# Patient Record
Sex: FEMALE | Race: WHITE | NOT HISPANIC OR LATINO | Employment: OTHER | URBAN - METROPOLITAN AREA
[De-identification: names, ages, dates, MRNs, and addresses within clinical notes are randomized per-mention and may not be internally consistent; named-entity substitution may affect disease eponyms.]

---

## 2023-02-25 LAB
APPEARANCE, URINE: ABNORMAL
BILIRUBIN, URINE: NEGATIVE
BLOOD, URINE: ABNORMAL
COLOR, URINE: YELLOW
GLUCOSE, URINE: NEGATIVE MG/DL
KETONES, URINE: NEGATIVE MG/DL
LEUKOCYTE ESTERASE, URINE: ABNORMAL
NITRITE, URINE: NEGATIVE
PH, URINE: 6 (ref 5–8)
PROTEIN, URINE: NEGATIVE MG/DL
RBC, URINE: 1 /HPF (ref 0–5)
SPECIFIC GRAVITY, URINE: 1.01 (ref 1–1.03)
URINE CULTURE: NORMAL
UROBILINOGEN, URINE: <2 MG/DL (ref 0–1.9)
WBC CLUMPS, URINE: ABNORMAL /HPF
WBC, URINE: >182 /HPF (ref 0–5)

## 2023-03-15 LAB
ALBUMIN (G/DL) IN SER/PLAS: 4.3 G/DL (ref 3.4–5)
ALBUMIN (MG/L) IN URINE: 38.1 MG/L
ALBUMIN/CREATININE (UG/MG) IN URINE: 29.5 UG/MG CRT (ref 0–30)
ANION GAP IN SER/PLAS: 15 MMOL/L (ref 10–20)
APPEARANCE, URINE: ABNORMAL
BACTERIA, URINE: ABNORMAL /HPF
BILIRUBIN, URINE: NEGATIVE
BLOOD, URINE: ABNORMAL
CALCIUM (MG/DL) IN SER/PLAS: 9.7 MG/DL (ref 8.6–10.6)
CARBON DIOXIDE, TOTAL (MMOL/L) IN SER/PLAS: 25 MMOL/L (ref 21–32)
CHLORIDE (MMOL/L) IN SER/PLAS: 104 MMOL/L (ref 98–107)
COLOR, URINE: YELLOW
CREATININE (MG/DL) IN SER/PLAS: 2.27 MG/DL (ref 0.5–1.05)
CREATININE (MG/DL) IN URINE: 129 MG/DL (ref 20–320)
ERYTHROCYTE DISTRIBUTION WIDTH (RATIO) BY AUTOMATED COUNT: 15.2 % (ref 11.5–14.5)
ERYTHROCYTE MEAN CORPUSCULAR HEMOGLOBIN CONCENTRATION (G/DL) BY AUTOMATED: 31.4 G/DL (ref 32–36)
ERYTHROCYTE MEAN CORPUSCULAR VOLUME (FL) BY AUTOMATED COUNT: 97 FL (ref 80–100)
ERYTHROCYTES (10*6/UL) IN BLOOD BY AUTOMATED COUNT: 3.46 X10E12/L (ref 4–5.2)
GFR FEMALE: 21 ML/MIN/1.73M2
GLUCOSE (MG/DL) IN SER/PLAS: 119 MG/DL (ref 74–99)
GLUCOSE, URINE: NEGATIVE MG/DL
HEMATOCRIT (%) IN BLOOD BY AUTOMATED COUNT: 33.4 % (ref 36–46)
HEMOGLOBIN (G/DL) IN BLOOD: 10.5 G/DL (ref 12–16)
KETONES, URINE: NEGATIVE MG/DL
LEUKOCYTE ESTERASE, URINE: ABNORMAL
LEUKOCYTES (10*3/UL) IN BLOOD BY AUTOMATED COUNT: 10.9 X10E9/L (ref 4.4–11.3)
MUCUS, URINE: ABNORMAL /LPF
NITRITE, URINE: NEGATIVE
NRBC (PER 100 WBCS) BY AUTOMATED COUNT: 0 /100 WBC (ref 0–0)
PH, URINE: 5 (ref 5–8)
PHOSPHATE (MG/DL) IN SER/PLAS: 3.6 MG/DL (ref 2.5–4.9)
PLATELETS (10*3/UL) IN BLOOD AUTOMATED COUNT: 266 X10E9/L (ref 150–450)
POTASSIUM (MMOL/L) IN SER/PLAS: 3.9 MMOL/L (ref 3.5–5.3)
PROTEIN, URINE: NEGATIVE MG/DL
RBC, URINE: 4 /HPF (ref 0–5)
SODIUM (MMOL/L) IN SER/PLAS: 140 MMOL/L (ref 136–145)
SPECIFIC GRAVITY, URINE: 1.02 (ref 1–1.03)
SQUAMOUS EPITHELIAL CELLS, URINE: 1 /HPF
TRANSITIONAL EPITHELIAL CELLS, URINE: 1 /HPF
UREA NITROGEN (MG/DL) IN SER/PLAS: 65 MG/DL (ref 6–23)
UROBILINOGEN, URINE: <2 MG/DL (ref 0–1.9)
WBC CLUMPS, URINE: ABNORMAL /HPF
WBC, URINE: >182 /HPF (ref 0–5)

## 2023-04-19 LAB
ALBUMIN (G/DL) IN SER/PLAS: 4 G/DL (ref 3.4–5)
ANION GAP IN SER/PLAS: 17 MMOL/L (ref 10–20)
CALCIUM (MG/DL) IN SER/PLAS: 10 MG/DL (ref 8.6–10.6)
CARBON DIOXIDE, TOTAL (MMOL/L) IN SER/PLAS: 26 MMOL/L (ref 21–32)
CHLORIDE (MMOL/L) IN SER/PLAS: 103 MMOL/L (ref 98–107)
CREATININE (MG/DL) IN SER/PLAS: 2.07 MG/DL (ref 0.5–1.05)
ERYTHROCYTE DISTRIBUTION WIDTH (RATIO) BY AUTOMATED COUNT: 15.5 % (ref 11.5–14.5)
ERYTHROCYTE MEAN CORPUSCULAR HEMOGLOBIN CONCENTRATION (G/DL) BY AUTOMATED: 31.3 G/DL (ref 32–36)
ERYTHROCYTE MEAN CORPUSCULAR VOLUME (FL) BY AUTOMATED COUNT: 99 FL (ref 80–100)
ERYTHROCYTES (10*6/UL) IN BLOOD BY AUTOMATED COUNT: 3.47 X10E12/L (ref 4–5.2)
GFR FEMALE: 23 ML/MIN/1.73M2
GLUCOSE (MG/DL) IN SER/PLAS: 96 MG/DL (ref 74–99)
HEMATOCRIT (%) IN BLOOD BY AUTOMATED COUNT: 34.2 % (ref 36–46)
HEMOGLOBIN (G/DL) IN BLOOD: 10.7 G/DL (ref 12–16)
LEUKOCYTES (10*3/UL) IN BLOOD BY AUTOMATED COUNT: 10.1 X10E9/L (ref 4.4–11.3)
NRBC (PER 100 WBCS) BY AUTOMATED COUNT: 0 /100 WBC (ref 0–0)
PHOSPHATE (MG/DL) IN SER/PLAS: 4.1 MG/DL (ref 2.5–4.9)
PLATELETS (10*3/UL) IN BLOOD AUTOMATED COUNT: 195 X10E9/L (ref 150–450)
POTASSIUM (MMOL/L) IN SER/PLAS: 4 MMOL/L (ref 3.5–5.3)
SODIUM (MMOL/L) IN SER/PLAS: 142 MMOL/L (ref 136–145)
UREA NITROGEN (MG/DL) IN SER/PLAS: 59 MG/DL (ref 6–23)

## 2023-04-22 LAB
URINE CULTURE: ABNORMAL
URINE CULTURE: ABNORMAL

## 2023-05-01 ENCOUNTER — APPOINTMENT (OUTPATIENT)
Dept: LAB | Facility: LAB | Age: 88
End: 2023-05-01
Payer: MEDICARE

## 2023-05-02 LAB — URINE CULTURE: NORMAL

## 2023-07-11 LAB
ALBUMIN (G/DL) IN SER/PLAS: 4.1 G/DL (ref 3.4–5)
ANION GAP IN SER/PLAS: 17 MMOL/L (ref 10–20)
CALCIUM (MG/DL) IN SER/PLAS: 9.8 MG/DL (ref 8.6–10.6)
CARBON DIOXIDE, TOTAL (MMOL/L) IN SER/PLAS: 26 MMOL/L (ref 21–32)
CHLORIDE (MMOL/L) IN SER/PLAS: 105 MMOL/L (ref 98–107)
CREATININE (MG/DL) IN SER/PLAS: 2.01 MG/DL (ref 0.5–1.05)
ERYTHROCYTE DISTRIBUTION WIDTH (RATIO) BY AUTOMATED COUNT: 13.8 % (ref 11.5–14.5)
ERYTHROCYTE MEAN CORPUSCULAR HEMOGLOBIN CONCENTRATION (G/DL) BY AUTOMATED: 31.7 G/DL (ref 32–36)
ERYTHROCYTE MEAN CORPUSCULAR VOLUME (FL) BY AUTOMATED COUNT: 101 FL (ref 80–100)
ERYTHROCYTES (10*6/UL) IN BLOOD BY AUTOMATED COUNT: 3.19 X10E12/L (ref 4–5.2)
GFR FEMALE: 24 ML/MIN/1.73M2
GLUCOSE (MG/DL) IN SER/PLAS: 96 MG/DL (ref 74–99)
HEMATOCRIT (%) IN BLOOD BY AUTOMATED COUNT: 32.2 % (ref 36–46)
HEMOGLOBIN (G/DL) IN BLOOD: 10.2 G/DL (ref 12–16)
LEUKOCYTES (10*3/UL) IN BLOOD BY AUTOMATED COUNT: 10.4 X10E9/L (ref 4.4–11.3)
NRBC (PER 100 WBCS) BY AUTOMATED COUNT: 0 /100 WBC (ref 0–0)
PHOSPHATE (MG/DL) IN SER/PLAS: 3.4 MG/DL (ref 2.5–4.9)
PLATELETS (10*3/UL) IN BLOOD AUTOMATED COUNT: 178 X10E9/L (ref 150–450)
POTASSIUM (MMOL/L) IN SER/PLAS: 3.7 MMOL/L (ref 3.5–5.3)
SODIUM (MMOL/L) IN SER/PLAS: 144 MMOL/L (ref 136–145)
UREA NITROGEN (MG/DL) IN SER/PLAS: 51 MG/DL (ref 6–23)

## 2023-07-12 ENCOUNTER — TELEPHONE (OUTPATIENT)
Dept: PRIMARY CARE | Facility: CLINIC | Age: 88
End: 2023-07-12
Payer: MEDICARE

## 2023-07-13 LAB — URINE CULTURE: ABNORMAL

## 2023-07-16 DIAGNOSIS — Z00.00 HEALTH CARE MAINTENANCE: Primary | ICD-10-CM

## 2023-07-16 RX ORDER — BUDESONIDE 3 MG/1
3 CAPSULE, COATED PELLETS ORAL
COMMUNITY
End: 2023-07-18 | Stop reason: SDUPTHER

## 2023-07-18 ENCOUNTER — OFFICE VISIT (OUTPATIENT)
Dept: PRIMARY CARE | Facility: CLINIC | Age: 88
End: 2023-07-18
Payer: MEDICARE

## 2023-07-18 VITALS — SYSTOLIC BLOOD PRESSURE: 144 MMHG | DIASTOLIC BLOOD PRESSURE: 76 MMHG

## 2023-07-18 DIAGNOSIS — K21.9 GASTROESOPHAGEAL REFLUX DISEASE WITHOUT ESOPHAGITIS: ICD-10-CM

## 2023-07-18 DIAGNOSIS — D64.9 ANEMIA, UNSPECIFIED TYPE: ICD-10-CM

## 2023-07-18 DIAGNOSIS — Z00.00 HEALTH CARE MAINTENANCE: Primary | ICD-10-CM

## 2023-07-18 DIAGNOSIS — N28.9 RENAL IMPAIRMENT: ICD-10-CM

## 2023-07-18 DIAGNOSIS — I10 PRIMARY HYPERTENSION: ICD-10-CM

## 2023-07-18 PROCEDURE — 3078F DIAST BP <80 MM HG: CPT | Performed by: INTERNAL MEDICINE

## 2023-07-18 PROCEDURE — 1126F AMNT PAIN NOTED NONE PRSNT: CPT | Performed by: INTERNAL MEDICINE

## 2023-07-18 PROCEDURE — 99214 OFFICE O/P EST MOD 30 MIN: CPT | Performed by: INTERNAL MEDICINE

## 2023-07-18 PROCEDURE — 3077F SYST BP >= 140 MM HG: CPT | Performed by: INTERNAL MEDICINE

## 2023-07-18 RX ORDER — BUDESONIDE 3 MG/1
CAPSULE, COATED PELLETS ORAL
Qty: 300 CAPSULE | Refills: 0 | Status: SHIPPED | OUTPATIENT
Start: 2023-07-18 | End: 2024-05-23 | Stop reason: WASHOUT

## 2023-07-18 NOTE — PROGRESS NOTES
Subjective   Patient ID: Kajal Null is a 88 y.o. female who presents for No chief complaint on file..    HPI follow-up visit no chest pain or shortness of breath has UTI was treated with Cipro but has not taken it yet and waiting for follow-up from the urologist and the nephrologist has been using the budesonide to 2 tablets or capsules per day no abdominal pain no betsey diarrhea with steroid    Review of Systems    Objective   There were no vitals taken for this visit.    Physical Exam vital signs noted alert and oriented x3 NCAT no JVD or bruit chest clear to auscultation CV regular rate and rhythm S1-S2 abdomen soft nontender normal active bowel sounds no rebound or guarding no HSM extremities no clubbing cyanosis or edema normal distal pulses    Assessment/Plan impression other diagnoses htn renal  anemia hyperlipidemia diarrhea gerd  Plan okay for disability placard see emr by request watch diet increase fiber Okay for budesonide prescription see emr continue with bp medicine recent blood work reviewed with stable elevated creatinine similarly with anemia avoid nsaids with bp and diarrhea and gerd ok for ppi or h2 blocker follow up with renal good nutrition for anemia good water consumption recheck more regularly three months and with additional bw lipids  tt40 cc21

## 2023-08-16 ENCOUNTER — OFFICE VISIT (OUTPATIENT)
Dept: PRIMARY CARE | Facility: CLINIC | Age: 88
End: 2023-08-16
Payer: MEDICARE

## 2023-08-16 VITALS — SYSTOLIC BLOOD PRESSURE: 123 MMHG | DIASTOLIC BLOOD PRESSURE: 74 MMHG

## 2023-08-16 DIAGNOSIS — K21.9 GASTROESOPHAGEAL REFLUX DISEASE, UNSPECIFIED WHETHER ESOPHAGITIS PRESENT: ICD-10-CM

## 2023-08-16 DIAGNOSIS — Z00.00 HEALTH CARE MAINTENANCE: ICD-10-CM

## 2023-08-16 DIAGNOSIS — I10 PRIMARY HYPERTENSION: Primary | ICD-10-CM

## 2023-08-16 DIAGNOSIS — E78.2 MIXED HYPERLIPIDEMIA: ICD-10-CM

## 2023-08-16 DIAGNOSIS — I87.8 VENOUS STASIS: ICD-10-CM

## 2023-08-16 PROCEDURE — 93000 ELECTROCARDIOGRAM COMPLETE: CPT | Performed by: INTERNAL MEDICINE

## 2023-08-16 PROCEDURE — 3078F DIAST BP <80 MM HG: CPT | Performed by: INTERNAL MEDICINE

## 2023-08-16 PROCEDURE — 1126F AMNT PAIN NOTED NONE PRSNT: CPT | Performed by: INTERNAL MEDICINE

## 2023-08-16 PROCEDURE — 1036F TOBACCO NON-USER: CPT | Performed by: INTERNAL MEDICINE

## 2023-08-16 PROCEDURE — 99213 OFFICE O/P EST LOW 20 MIN: CPT | Performed by: INTERNAL MEDICINE

## 2023-08-16 PROCEDURE — G0439 PPPS, SUBSEQ VISIT: HCPCS | Performed by: INTERNAL MEDICINE

## 2023-08-16 PROCEDURE — 1170F FXNL STATUS ASSESSED: CPT | Performed by: INTERNAL MEDICINE

## 2023-08-16 PROCEDURE — 3074F SYST BP LT 130 MM HG: CPT | Performed by: INTERNAL MEDICINE

## 2023-08-16 PROCEDURE — 1159F MED LIST DOCD IN RCRD: CPT | Performed by: INTERNAL MEDICINE

## 2023-08-16 PROCEDURE — 1160F RVW MEDS BY RX/DR IN RCRD: CPT | Performed by: INTERNAL MEDICINE

## 2023-08-16 NOTE — PROGRESS NOTES
Subjective   Patient ID: Kajal Null is a 88 y.o. female who presents for No chief complaint on file..    HPI CPE see updated front sheet no chest pain no shortness of breath mostly her joints have been aching has been taking more and more Advil although has been following up for her bowels and kidneys no dysuria    Past medical history hypertension hyperlipidemia renal anemia GERD    Medications noted and unchanged    Allergies noted and unchanged    Social history no tobacco    Family history noted and unchanged    Prevention has been using the budesonide limited exercise some prior laboratory results reviewed    Depression screen not depressed  Review of Systems    Objective   There were no vitals taken for this visit.    Physical Exam vital signs noted alert and oriented x3 NCAT PERRLA EOMI nares without discharge OP benign TM normal opaque bilateral EAC clear bilateral no AC nodes no thyromegaly no JVD or bruit chest clear to auscultation and percussion CV regular rate and rhythm S1-S2 without murmur gallop or rub except for 1 out of 6 systolic ejection murmur medial LS spine normal curvature negative straight leg raise negative logroll negative SI joint tenderness some tightness of the posterior paraspinal muscles abdomen soft nontender normal active bowel sounds no rebound or guarding no HSM extremities no clubbing cyanosis trace pitting and nonpitting edema normal distal pulses musculoskeletal DJD changes of the hands    Assessment/Plan impression General medical examination hypertension hyperlipidemia GERD osteoarthritis venous stasis  Plan for her legs symmetrical slightly swollen not much in the way of pitting all below the knees wear stocking hose elevate legs at the end of the day no additional diuretics needed for this check EKG advised on heart check lipid panel she will have this done when she has her blood work for the nephrologist done next week advised on lipids discontinue Advil use for her  kidneys stomach blood pressure and instead okay for Tylenol 650 mg up to 3 times daily on an as-needed basis range of motion exercises follow-up with cardiology she will see Dr. Nolan follow-up with nephrology she will see Dr. Madrigal follow-up for regular ophthalmology follow-up for gastroenterology and looking at her endoscopies for her upper and follow-up good diet regular exercise increase water consumption discussed with mammograms will get next COVID booster in the fall 2023 okay for PPI or H2 blocker reviewed medications with her recheck 3 months based on above TT 60 cc 31

## 2023-08-21 ENCOUNTER — LAB (OUTPATIENT)
Dept: LAB | Facility: LAB | Age: 88
End: 2023-08-21
Payer: MEDICARE

## 2023-08-22 ENCOUNTER — APPOINTMENT (OUTPATIENT)
Dept: PRIMARY CARE | Facility: CLINIC | Age: 88
End: 2023-08-22
Payer: MEDICARE

## 2023-08-22 LAB
ALBUMIN (G/DL) IN SER/PLAS: 4.2 G/DL (ref 3.4–5)
ANION GAP IN SER/PLAS: 16 MMOL/L (ref 10–20)
CALCIDIOL (25 OH VITAMIN D3) (NG/ML) IN SER/PLAS: 29 NG/ML
CALCIUM (MG/DL) IN SER/PLAS: 9.9 MG/DL (ref 8.6–10.6)
CARBON DIOXIDE, TOTAL (MMOL/L) IN SER/PLAS: 28 MMOL/L (ref 21–32)
CHLORIDE (MMOL/L) IN SER/PLAS: 102 MMOL/L (ref 98–107)
CREATININE (MG/DL) IN SER/PLAS: 1.86 MG/DL (ref 0.5–1.05)
FERRITIN (UG/LL) IN SER/PLAS: 223 UG/L (ref 8–150)
GFR FEMALE: 26 ML/MIN/1.73M2
GLUCOSE (MG/DL) IN SER/PLAS: 104 MG/DL (ref 74–99)
IRON (UG/DL) IN SER/PLAS: 102 UG/DL (ref 35–150)
IRON BINDING CAPACITY (UG/DL) IN SER/PLAS: 354 UG/DL (ref 240–445)
IRON SATURATION (%) IN SER/PLAS: 29 % (ref 25–45)
PHOSPHATE (MG/DL) IN SER/PLAS: 4.2 MG/DL (ref 2.5–4.9)
POTASSIUM (MMOL/L) IN SER/PLAS: 3.8 MMOL/L (ref 3.5–5.3)
SODIUM (MMOL/L) IN SER/PLAS: 142 MMOL/L (ref 136–145)
UREA NITROGEN (MG/DL) IN SER/PLAS: 49 MG/DL (ref 6–23)

## 2023-08-23 LAB
ERYTHROCYTE DISTRIBUTION WIDTH (RATIO) BY AUTOMATED COUNT: 14.3 % (ref 11.5–14.5)
ERYTHROCYTE MEAN CORPUSCULAR HEMOGLOBIN CONCENTRATION (G/DL) BY AUTOMATED: 31.8 G/DL (ref 32–36)
ERYTHROCYTE MEAN CORPUSCULAR VOLUME (FL) BY AUTOMATED COUNT: 100 FL (ref 80–100)
ERYTHROCYTES (10*6/UL) IN BLOOD BY AUTOMATED COUNT: 3.25 X10E12/L (ref 4–5.2)
HEMATOCRIT (%) IN BLOOD BY AUTOMATED COUNT: 32.4 % (ref 36–46)
HEMOGLOBIN (G/DL) IN BLOOD: 10.3 G/DL (ref 12–16)
LEUKOCYTES (10*3/UL) IN BLOOD BY AUTOMATED COUNT: 8.5 X10E9/L (ref 4.4–11.3)
NRBC (PER 100 WBCS) BY AUTOMATED COUNT: 0 /100 WBC (ref 0–0)
PARATHYRIN INTACT (PG/ML) IN SER/PLAS: 123.4 PG/ML (ref 18.5–88)
PLATELETS (10*3/UL) IN BLOOD AUTOMATED COUNT: 167 X10E9/L (ref 150–450)

## 2023-08-25 LAB
ALBUMIN (MG/L) IN URINE: 8.1 MG/L
ALBUMIN/CREATININE (UG/MG) IN URINE: 42.2 UG/MG CRT (ref 0–30)
APPEARANCE, URINE: ABNORMAL
BILIRUBIN, URINE: NEGATIVE
BLOOD, URINE: ABNORMAL
COLOR, URINE: YELLOW
CREATININE (MG/DL) IN URINE: 19.2 MG/DL (ref 20–320)
GLUCOSE, URINE: NEGATIVE MG/DL
KETONES, URINE: NEGATIVE MG/DL
LEUKOCYTE ESTERASE, URINE: ABNORMAL
NITRITE, URINE: NEGATIVE
PH, URINE: 5 (ref 5–8)
PROTEIN, URINE: NEGATIVE MG/DL
SPECIFIC GRAVITY, URINE: 1 (ref 1–1.03)
UROBILINOGEN, URINE: <2 MG/DL (ref 0–1.9)

## 2023-08-26 LAB
BACTERIA, URINE: ABNORMAL /HPF
MUCUS, URINE: ABNORMAL /LPF
RBC, URINE: 1 /HPF (ref 0–5)
SQUAMOUS EPITHELIAL CELLS, URINE: <1 /HPF
TRANSITIONAL EPITHELIAL CELLS, URINE: <1 /HPF
WBC CLUMPS, URINE: ABNORMAL /HPF
WBC, URINE: 82 /HPF (ref 0–5)

## 2023-08-28 LAB — URINE CULTURE: ABNORMAL

## 2023-08-31 ASSESSMENT — ACTIVITIES OF DAILY LIVING (ADL)
BATHING: INDEPENDENT
MANAGING_FINANCES: INDEPENDENT
DOING_HOUSEWORK: INDEPENDENT
GROCERY_SHOPPING: INDEPENDENT
DRESSING: INDEPENDENT
TAKING_MEDICATION: INDEPENDENT

## 2023-08-31 ASSESSMENT — PATIENT HEALTH QUESTIONNAIRE - PHQ9
1. LITTLE INTEREST OR PLEASURE IN DOING THINGS: NOT AT ALL
SUM OF ALL RESPONSES TO PHQ9 QUESTIONS 1 AND 2: 0
2. FEELING DOWN, DEPRESSED OR HOPELESS: NOT AT ALL

## 2023-08-31 ASSESSMENT — ENCOUNTER SYMPTOMS
LOSS OF SENSATION IN FEET: 0
OCCASIONAL FEELINGS OF UNSTEADINESS: 0
DEPRESSION: 0

## 2023-10-11 ENCOUNTER — LAB (OUTPATIENT)
Dept: LAB | Facility: LAB | Age: 88
End: 2023-10-11
Payer: MEDICARE

## 2023-10-11 DIAGNOSIS — N39.0 URINARY TRACT INFECTION, SITE NOT SPECIFIED: ICD-10-CM

## 2023-10-11 DIAGNOSIS — N39.0 URINARY TRACT INFECTION, SITE NOT SPECIFIED: Primary | ICD-10-CM

## 2023-10-11 LAB
APPEARANCE UR: CLEAR
BILIRUB UR STRIP.AUTO-MCNC: NEGATIVE MG/DL
COLOR UR: YELLOW
GLUCOSE UR STRIP.AUTO-MCNC: NEGATIVE MG/DL
KETONES UR STRIP.AUTO-MCNC: NEGATIVE MG/DL
LEUKOCYTE ESTERASE UR QL STRIP.AUTO: ABNORMAL
MUCOUS THREADS #/AREA URNS AUTO: ABNORMAL /LPF
NITRITE UR QL STRIP.AUTO: NEGATIVE
PH UR STRIP.AUTO: 5 [PH]
PROT UR STRIP.AUTO-MCNC: NEGATIVE MG/DL
RBC # UR STRIP.AUTO: NEGATIVE /UL
RBC #/AREA URNS AUTO: ABNORMAL /HPF
RENAL EPI CELLS #/AREA UR COMP ASSIST: ABNORMAL /HPF
SP GR UR STRIP.AUTO: 1.02
SQUAMOUS #/AREA URNS AUTO: ABNORMAL /HPF
UROBILINOGEN UR STRIP.AUTO-MCNC: <2 MG/DL
WBC #/AREA URNS AUTO: ABNORMAL /HPF

## 2023-10-11 PROCEDURE — 81001 URINALYSIS AUTO W/SCOPE: CPT

## 2023-10-11 PROCEDURE — 87086 URINE CULTURE/COLONY COUNT: CPT

## 2023-10-12 LAB — BACTERIA UR CULT: NORMAL

## 2023-10-18 ENCOUNTER — TELEPHONE (OUTPATIENT)
Dept: PRIMARY CARE | Facility: CLINIC | Age: 88
End: 2023-10-18
Payer: MEDICARE

## 2023-11-07 ENCOUNTER — TELEPHONE (OUTPATIENT)
Dept: PRIMARY CARE | Facility: CLINIC | Age: 88
End: 2023-11-07

## 2023-11-08 DIAGNOSIS — Z12.31 VISIT FOR SCREENING MAMMOGRAM: Primary | ICD-10-CM

## 2023-11-13 ENCOUNTER — TELEPHONE (OUTPATIENT)
Dept: UROLOGY | Facility: CLINIC | Age: 88
End: 2023-11-13
Payer: MEDICARE

## 2023-11-13 NOTE — TELEPHONE ENCOUNTER
I tried to add the pharmacy but it wont come up I think because its in [place]. I even tried to put in the address and it still wont come up

## 2023-11-17 DIAGNOSIS — N39.41 URGE URINARY INCONTINENCE: Primary | ICD-10-CM

## 2023-11-17 RX ORDER — MIRABEGRON 50 MG/1
50 TABLET, EXTENDED RELEASE ORAL DAILY
Qty: 90 TABLET | Refills: 3 | Status: SHIPPED | OUTPATIENT
Start: 2023-11-17 | End: 2024-11-16

## 2023-12-01 ENCOUNTER — LAB (OUTPATIENT)
Dept: LAB | Facility: LAB | Age: 88
End: 2023-12-01
Payer: MEDICARE

## 2023-12-01 DIAGNOSIS — I10 ESSENTIAL (PRIMARY) HYPERTENSION: ICD-10-CM

## 2023-12-01 DIAGNOSIS — N18.4 CHRONIC KIDNEY DISEASE, STAGE 4 (SEVERE) (MULTI): ICD-10-CM

## 2023-12-01 DIAGNOSIS — I10 PRIMARY HYPERTENSION: ICD-10-CM

## 2023-12-01 DIAGNOSIS — R60.1 GENERALIZED EDEMA: Primary | ICD-10-CM

## 2023-12-01 DIAGNOSIS — E87.1 HYPO-OSMOLALITY AND HYPONATREMIA: ICD-10-CM

## 2023-12-01 DIAGNOSIS — N18.4 CHRONIC KIDNEY DISEASE, STAGE 4 (SEVERE) (MULTI): Primary | ICD-10-CM

## 2023-12-01 DIAGNOSIS — R60.1 GENERALIZED EDEMA: ICD-10-CM

## 2023-12-01 LAB
ALBUMIN SERPL BCP-MCNC: 3.9 G/DL (ref 3.4–5)
ANION GAP SERPL CALC-SCNC: 15 MMOL/L (ref 10–20)
BUN SERPL-MCNC: 68 MG/DL (ref 6–23)
CALCIUM SERPL-MCNC: 9.6 MG/DL (ref 8.6–10.6)
CHLORIDE SERPL-SCNC: 104 MMOL/L (ref 98–107)
CHOLEST SERPL-MCNC: 257 MG/DL (ref 0–199)
CHOLESTEROL/HDL RATIO: 2.6
CO2 SERPL-SCNC: 23 MMOL/L (ref 21–32)
CREAT SERPL-MCNC: 1.91 MG/DL (ref 0.5–1.05)
ERYTHROCYTE [DISTWIDTH] IN BLOOD BY AUTOMATED COUNT: 14.9 % (ref 11.5–14.5)
GFR SERPL CREATININE-BSD FRML MDRD: 25 ML/MIN/1.73M*2
GLUCOSE SERPL-MCNC: 97 MG/DL (ref 74–99)
HCT VFR BLD AUTO: 34.2 % (ref 36–46)
HDLC SERPL-MCNC: 97.2 MG/DL
HGB BLD-MCNC: 10.6 G/DL (ref 12–16)
LDLC SERPL CALC-MCNC: 141 MG/DL
MCH RBC QN AUTO: 31.5 PG (ref 26–34)
MCHC RBC AUTO-ENTMCNC: 31 G/DL (ref 32–36)
MCV RBC AUTO: 102 FL (ref 80–100)
NON HDL CHOLESTEROL: 160 MG/DL (ref 0–149)
NRBC BLD-RTO: 0 /100 WBCS (ref 0–0)
PHOSPHATE SERPL-MCNC: 4.1 MG/DL (ref 2.5–4.9)
PLATELET # BLD AUTO: 153 X10*3/UL (ref 150–450)
POTASSIUM SERPL-SCNC: 4.4 MMOL/L (ref 3.5–5.3)
RBC # BLD AUTO: 3.36 X10*6/UL (ref 4–5.2)
SODIUM SERPL-SCNC: 138 MMOL/L (ref 136–145)
TRIGL SERPL-MCNC: 96 MG/DL (ref 0–149)
VLDL: 19 MG/DL (ref 0–40)
WBC # BLD AUTO: 7.9 X10*3/UL (ref 4.4–11.3)

## 2023-12-01 PROCEDURE — 83550 IRON BINDING TEST: CPT | Performed by: INTERNAL MEDICINE

## 2023-12-01 PROCEDURE — 83540 ASSAY OF IRON: CPT | Performed by: INTERNAL MEDICINE

## 2023-12-01 PROCEDURE — 80069 RENAL FUNCTION PANEL: CPT | Performed by: INTERNAL MEDICINE

## 2023-12-01 PROCEDURE — 80061 LIPID PANEL: CPT | Performed by: INTERNAL MEDICINE

## 2023-12-01 PROCEDURE — 85027 COMPLETE CBC AUTOMATED: CPT | Performed by: INTERNAL MEDICINE

## 2023-12-02 ENCOUNTER — TELEPHONE (OUTPATIENT)
Dept: UROLOGY | Facility: CLINIC | Age: 88
End: 2023-12-02

## 2023-12-02 ENCOUNTER — LAB (OUTPATIENT)
Dept: LAB | Facility: LAB | Age: 88
End: 2023-12-02
Payer: MEDICARE

## 2023-12-02 DIAGNOSIS — N39.0 CHRONIC UTI: Primary | ICD-10-CM

## 2023-12-02 DIAGNOSIS — N39.0 CHRONIC UTI: ICD-10-CM

## 2023-12-02 LAB
APPEARANCE UR: ABNORMAL
BILIRUB UR STRIP.AUTO-MCNC: NEGATIVE MG/DL
COLOR UR: YELLOW
GLUCOSE UR STRIP.AUTO-MCNC: NEGATIVE MG/DL
HOLD SPECIMEN: NORMAL
KETONES UR STRIP.AUTO-MCNC: NEGATIVE MG/DL
LEUKOCYTE ESTERASE UR QL STRIP.AUTO: ABNORMAL
NITRITE UR QL STRIP.AUTO: POSITIVE
PH UR STRIP.AUTO: 5 [PH]
PROT UR STRIP.AUTO-MCNC: ABNORMAL MG/DL
RBC # UR STRIP.AUTO: ABNORMAL /UL
RBC #/AREA URNS AUTO: >20 /HPF
SP GR UR STRIP.AUTO: 1.01
UROBILINOGEN UR STRIP.AUTO-MCNC: <2 MG/DL
WBC #/AREA URNS AUTO: >50 /HPF
WBC CLUMPS #/AREA URNS AUTO: ABNORMAL /HPF

## 2023-12-02 PROCEDURE — 81001 URINALYSIS AUTO W/SCOPE: CPT

## 2023-12-02 PROCEDURE — 87086 URINE CULTURE/COLONY COUNT: CPT

## 2023-12-02 PROCEDURE — 87186 SC STD MICRODIL/AGAR DIL: CPT

## 2023-12-02 PROCEDURE — 87181 SC STD AGAR DILUTION PER AGT: CPT

## 2023-12-03 LAB
IRON SATN MFR SERPL: 25 % (ref 25–45)
IRON SERPL-MCNC: 84 UG/DL (ref 35–150)
TIBC SERPL-MCNC: 335 UG/DL (ref 240–445)
UIBC SERPL-MCNC: 251 UG/DL (ref 110–370)

## 2023-12-04 ENCOUNTER — LAB (OUTPATIENT)
Dept: LAB | Facility: LAB | Age: 88
End: 2023-12-04
Payer: MEDICARE

## 2023-12-04 DIAGNOSIS — N39.0 CHRONIC UTI: ICD-10-CM

## 2023-12-04 LAB
APPEARANCE UR: ABNORMAL
BACTERIA #/AREA URNS AUTO: ABNORMAL /HPF
BILIRUB UR STRIP.AUTO-MCNC: NEGATIVE MG/DL
COLOR UR: YELLOW
GLUCOSE UR STRIP.AUTO-MCNC: ABNORMAL MG/DL
HOLD SPECIMEN: NORMAL
HYALINE CASTS #/AREA URNS AUTO: ABNORMAL /LPF
KETONES UR STRIP.AUTO-MCNC: NEGATIVE MG/DL
LEUKOCYTE ESTERASE UR QL STRIP.AUTO: ABNORMAL
NITRITE UR QL STRIP.AUTO: POSITIVE
PH UR STRIP.AUTO: 5 [PH]
PROT UR STRIP.AUTO-MCNC: ABNORMAL MG/DL
RBC # UR STRIP.AUTO: NEGATIVE /UL
RBC #/AREA URNS AUTO: ABNORMAL /HPF
SP GR UR STRIP.AUTO: 1.01
UROBILINOGEN UR STRIP.AUTO-MCNC: <2 MG/DL
WBC #/AREA URNS AUTO: >50 /HPF
WBC CLUMPS #/AREA URNS AUTO: ABNORMAL /HPF

## 2023-12-04 PROCEDURE — 87086 URINE CULTURE/COLONY COUNT: CPT

## 2023-12-04 PROCEDURE — 87186 SC STD MICRODIL/AGAR DIL: CPT

## 2023-12-04 PROCEDURE — 81001 URINALYSIS AUTO W/SCOPE: CPT

## 2023-12-05 LAB — BACTERIA UR CULT: ABNORMAL

## 2023-12-06 DIAGNOSIS — N39.0 CHRONIC UTI: ICD-10-CM

## 2023-12-06 DIAGNOSIS — N39.0 ACUTE UTI: Primary | ICD-10-CM

## 2023-12-06 RX ORDER — CEPHALEXIN 250 MG/1
CAPSULE ORAL
Qty: 90 CAPSULE | Refills: 2 | Status: SHIPPED | OUTPATIENT
Start: 2023-12-06 | End: 2023-12-18 | Stop reason: SDUPTHER

## 2023-12-06 RX ORDER — CIPROFLOXACIN 250 MG/1
250 TABLET, FILM COATED ORAL 2 TIMES DAILY
Qty: 14 TABLET | Refills: 0 | Status: SHIPPED | OUTPATIENT
Start: 2023-12-06 | End: 2023-12-18 | Stop reason: ALTCHOICE

## 2023-12-07 LAB — BACTERIA UR CULT: ABNORMAL

## 2023-12-18 ENCOUNTER — TELEPHONE (OUTPATIENT)
Dept: UROLOGY | Facility: CLINIC | Age: 88
End: 2023-12-18

## 2023-12-21 ENCOUNTER — ANCILLARY PROCEDURE (OUTPATIENT)
Dept: RADIOLOGY | Facility: CLINIC | Age: 88
End: 2023-12-21
Payer: MEDICARE

## 2023-12-21 DIAGNOSIS — Z12.31 VISIT FOR SCREENING MAMMOGRAM: ICD-10-CM

## 2023-12-21 PROCEDURE — 77063 BREAST TOMOSYNTHESIS BI: CPT | Performed by: RADIOLOGY

## 2023-12-21 PROCEDURE — 77067 SCR MAMMO BI INCL CAD: CPT | Performed by: RADIOLOGY

## 2023-12-21 PROCEDURE — 77067 SCR MAMMO BI INCL CAD: CPT

## 2023-12-31 DIAGNOSIS — K21.9 GASTRO-ESOPHAGEAL REFLUX DISEASE WITHOUT ESOPHAGITIS: ICD-10-CM

## 2023-12-31 DIAGNOSIS — E78.2 MIXED HYPERLIPIDEMIA: Primary | ICD-10-CM

## 2024-01-03 RX ORDER — OMEPRAZOLE 20 MG/1
20 CAPSULE, DELAYED RELEASE ORAL DAILY
Qty: 90 CAPSULE | Refills: 1 | Status: SHIPPED | OUTPATIENT
Start: 2024-01-03

## 2024-01-03 RX ORDER — ATORVASTATIN CALCIUM 40 MG/1
40 TABLET, FILM COATED ORAL DAILY
Qty: 90 TABLET | Refills: 1 | Status: SHIPPED | OUTPATIENT
Start: 2024-01-03

## 2024-01-18 ENCOUNTER — LAB (OUTPATIENT)
Dept: LAB | Facility: LAB | Age: 89
End: 2024-01-18
Payer: MEDICARE

## 2024-01-18 ENCOUNTER — TELEPHONE (OUTPATIENT)
Dept: GASTROENTEROLOGY | Facility: CLINIC | Age: 89
End: 2024-01-18
Payer: MEDICARE

## 2024-01-18 DIAGNOSIS — K92.1 MELENA: Primary | ICD-10-CM

## 2024-01-18 DIAGNOSIS — K92.1 MELENA: ICD-10-CM

## 2024-01-18 LAB
ALBUMIN SERPL BCP-MCNC: 4.2 G/DL (ref 3.4–5)
ALP SERPL-CCNC: 46 U/L (ref 33–136)
ALT SERPL W P-5'-P-CCNC: 10 U/L (ref 7–45)
ANION GAP SERPL CALC-SCNC: 16 MMOL/L (ref 10–20)
AST SERPL W P-5'-P-CCNC: 18 U/L (ref 9–39)
BILIRUB SERPL-MCNC: 0.5 MG/DL (ref 0–1.2)
BUN SERPL-MCNC: 65 MG/DL (ref 6–23)
CALCIUM SERPL-MCNC: 9.5 MG/DL (ref 8.6–10.6)
CHLORIDE SERPL-SCNC: 99 MMOL/L (ref 98–107)
CO2 SERPL-SCNC: 28 MMOL/L (ref 21–32)
CREAT SERPL-MCNC: 2.66 MG/DL (ref 0.5–1.05)
EGFRCR SERPLBLD CKD-EPI 2021: 17 ML/MIN/1.73M*2
ERYTHROCYTE [DISTWIDTH] IN BLOOD BY AUTOMATED COUNT: 14.5 % (ref 11.5–14.5)
FERRITIN SERPL-MCNC: 302 NG/ML (ref 8–150)
GLUCOSE SERPL-MCNC: 111 MG/DL (ref 74–99)
HCT VFR BLD AUTO: 31.9 % (ref 36–46)
HGB BLD-MCNC: 10.1 G/DL (ref 12–16)
IRON SATN MFR SERPL: 34 % (ref 25–45)
IRON SERPL-MCNC: 128 UG/DL (ref 35–150)
MCH RBC QN AUTO: 32.5 PG (ref 26–34)
MCHC RBC AUTO-ENTMCNC: 31.7 G/DL (ref 32–36)
MCV RBC AUTO: 103 FL (ref 80–100)
NRBC BLD-RTO: 0 /100 WBCS (ref 0–0)
PLATELET # BLD AUTO: 169 X10*3/UL (ref 150–450)
POTASSIUM SERPL-SCNC: 3.9 MMOL/L (ref 3.5–5.3)
PROT SERPL-MCNC: 6.7 G/DL (ref 6.4–8.2)
RBC # BLD AUTO: 3.11 X10*6/UL (ref 4–5.2)
SODIUM SERPL-SCNC: 139 MMOL/L (ref 136–145)
TIBC SERPL-MCNC: 380 UG/DL (ref 240–445)
UIBC SERPL-MCNC: 252 UG/DL (ref 110–370)
WBC # BLD AUTO: 9.1 X10*3/UL (ref 4.4–11.3)

## 2024-01-18 PROCEDURE — 82728 ASSAY OF FERRITIN: CPT

## 2024-01-18 PROCEDURE — 85027 COMPLETE CBC AUTOMATED: CPT

## 2024-01-18 PROCEDURE — 36415 COLL VENOUS BLD VENIPUNCTURE: CPT

## 2024-01-18 PROCEDURE — 83540 ASSAY OF IRON: CPT

## 2024-01-18 PROCEDURE — 83550 IRON BINDING TEST: CPT

## 2024-01-18 PROCEDURE — 80053 COMPREHEN METABOLIC PANEL: CPT

## 2024-01-18 NOTE — TELEPHONE ENCOUNTER
----- Message from Madelin Armstrong MA sent at 1/18/2024  3:23 PM EST -----  Black tarry stools x 2 months [phone]

## 2024-01-18 NOTE — TELEPHONE ENCOUNTER
"Phone - \"black tarry stools for 2 months\" - every day - no abd pain - appetite OK - weight down 10# - I rec: check labs, appt.   "

## 2024-01-19 NOTE — RESULT ENCOUNTER NOTE
I called patient - Hg, Bun at or close to baseline - she now states that she has been taking Iron, which may be the source of her black stools - I rec: appt., will check guiaic then

## 2024-02-07 ENCOUNTER — TELEPHONE (OUTPATIENT)
Dept: NEPHROLOGY | Facility: HOSPITAL | Age: 89
End: 2024-02-07

## 2024-03-07 ENCOUNTER — LAB (OUTPATIENT)
Dept: LAB | Facility: LAB | Age: 89
End: 2024-03-07
Payer: MEDICARE

## 2024-03-07 DIAGNOSIS — N18.30 CHRONIC KIDNEY DISEASE, STAGE 3 UNSPECIFIED (MULTI): Primary | ICD-10-CM

## 2024-03-07 DIAGNOSIS — N39.0 CHRONIC UTI: ICD-10-CM

## 2024-03-07 DIAGNOSIS — R60.1 GENERALIZED EDEMA: ICD-10-CM

## 2024-03-07 DIAGNOSIS — E87.1 HYPO-OSMOLALITY AND HYPONATREMIA: ICD-10-CM

## 2024-03-07 DIAGNOSIS — N18.30 CHRONIC KIDNEY DISEASE, STAGE 3 UNSPECIFIED (MULTI): ICD-10-CM

## 2024-03-07 LAB
ALBUMIN SERPL BCP-MCNC: 3.8 G/DL (ref 3.4–5)
ANION GAP SERPL CALC-SCNC: 16 MMOL/L (ref 10–20)
BUN SERPL-MCNC: 62 MG/DL (ref 6–23)
CALCIUM SERPL-MCNC: 10.3 MG/DL (ref 8.6–10.6)
CHLORIDE SERPL-SCNC: 108 MMOL/L (ref 98–107)
CO2 SERPL-SCNC: 20 MMOL/L (ref 21–32)
CREAT SERPL-MCNC: 2.83 MG/DL (ref 0.5–1.05)
EGFRCR SERPLBLD CKD-EPI 2021: 16 ML/MIN/1.73M*2
GLUCOSE SERPL-MCNC: 93 MG/DL (ref 74–99)
PHOSPHATE SERPL-MCNC: 4.5 MG/DL (ref 2.5–4.9)
POTASSIUM SERPL-SCNC: 5 MMOL/L (ref 3.5–5.3)
SODIUM SERPL-SCNC: 139 MMOL/L (ref 136–145)

## 2024-03-07 PROCEDURE — 80069 RENAL FUNCTION PANEL: CPT

## 2024-03-07 PROCEDURE — 36415 COLL VENOUS BLD VENIPUNCTURE: CPT

## 2024-03-14 ENCOUNTER — OFFICE VISIT (OUTPATIENT)
Dept: GASTROENTEROLOGY | Facility: CLINIC | Age: 89
End: 2024-03-14
Payer: MEDICARE

## 2024-03-14 VITALS — WEIGHT: 116 LBS | BODY MASS INDEX: 23.39 KG/M2 | HEIGHT: 59 IN

## 2024-03-14 DIAGNOSIS — K52.9 COLITIS: Primary | ICD-10-CM

## 2024-03-14 PROCEDURE — 99214 OFFICE O/P EST MOD 30 MIN: CPT | Performed by: INTERNAL MEDICINE

## 2024-03-14 PROCEDURE — 1036F TOBACCO NON-USER: CPT | Performed by: INTERNAL MEDICINE

## 2024-03-14 PROCEDURE — 1159F MED LIST DOCD IN RCRD: CPT | Performed by: INTERNAL MEDICINE

## 2024-03-14 RX ORDER — HERBAL COMPLEX NO.174 450 MG
650 CAPSULE ORAL DAILY
COMMUNITY

## 2024-03-14 RX ORDER — CLOBETASOL PROPIONATE 0.5 MG/G
CREAM TOPICAL 2 TIMES DAILY
COMMUNITY

## 2024-03-14 RX ORDER — FUROSEMIDE 20 MG/1
60 TABLET ORAL DAILY
COMMUNITY

## 2024-03-14 RX ORDER — AMOXICILLIN 500 MG/1
500 TABLET, FILM COATED ORAL
COMMUNITY
End: 2024-05-23 | Stop reason: WASHOUT

## 2024-03-14 RX ORDER — ESTRADIOL 0.1 MG/G
CREAM VAGINAL DAILY
COMMUNITY
Start: 2018-12-21

## 2024-03-14 RX ORDER — ACETAMINOPHEN 500 MG
TABLET ORAL DAILY
COMMUNITY
Start: 2014-04-16

## 2024-03-14 RX ORDER — FERROUS SULFATE 325(65) MG
65 TABLET ORAL DAILY
COMMUNITY
Start: 2020-06-17

## 2024-03-14 RX ORDER — FAMOTIDINE 20 MG/1
20 TABLET, FILM COATED ORAL NIGHTLY
COMMUNITY

## 2024-03-14 RX ORDER — ACETAMINOPHEN 500 MG
500 TABLET ORAL 2 TIMES DAILY
COMMUNITY
End: 2024-05-23 | Stop reason: WASHOUT

## 2024-03-14 RX ORDER — SPIRONOLACTONE 25 MG/1
25 TABLET ORAL DAILY
COMMUNITY

## 2024-03-14 NOTE — PROGRESS NOTES
Subjective     History of Present Illness:   Kajal Null is a 89 y.o. female with PMHx of lymphocytic colitis who presents to GI clinic for follow up.   Tends to be constipated, does not use laxative.   No diarrhea, no rectal bleeding.   Had Shingles in February, still painful.      Has h/o lymphocytic colitis - has not taken budesonide for 1-2 months - no recurrence of diarrhea        Review of Systems  Review of Systems    Allergies  Allergies   Allergen Reactions    Alendronate Other    Dexlansoprazole Nausea/vomiting and Nausea Only    Nitrofurantoin GI Upset    Trimethoprim Diarrhea    Sulfa (Sulfonamide Antibiotics) Rash       Medications  Current Outpatient Medications   Medication Instructions    atorvastatin (LIPITOR) 40 mg, oral, Daily, as directed    budesonide EC (Entocort EC) 3 mg 24 hr capsule 1 capsule p.o. up to twice daily    cephalexin (Keflex) 250 mg capsule Take one capsule daily after completing ciprofloxacin    mirabegron (MYRBETRIQ) 50 mg, oral, Daily    omeprazole (PRILOSEC) 20 mg, oral, Daily        Objective   There were no vitals taken for this visit.   Physical Exam    136/82    Lungs clear  CV - rrr, 2/6 sys murmur  Abd - soft, nontender      Lab Results   Component Value Date    WBC 9.1 01/18/2024    WBC 7.9 12/01/2023    WBC 8.5 08/22/2023    HGB 10.1 (L) 01/18/2024    HGB 10.6 (L) 12/01/2023    HGB 10.3 (L) 08/22/2023    HCT 31.9 (L) 01/18/2024    HCT 34.2 (L) 12/01/2023    HCT 32.4 (L) 08/22/2023     01/18/2024     12/01/2023     08/22/2023     Lab Results   Component Value Date     03/07/2024     01/18/2024     12/01/2023    K 5.0 03/07/2024    K 3.9 01/18/2024    K 4.4 12/01/2023     (H) 03/07/2024    CL 99 01/18/2024     12/01/2023    CO2 20 (L) 03/07/2024    CO2 28 01/18/2024    CO2 23 12/01/2023    BUN 62 (H) 03/07/2024    BUN 65 (H) 01/18/2024    BUN 68 (H) 12/01/2023    CREATININE 2.83 (H) 03/07/2024    CREATININE 2.66 (H)  01/18/2024    CREATININE 1.91 (H) 12/01/2023    CALCIUM 10.3 03/07/2024    CALCIUM 9.5 01/18/2024    CALCIUM 9.6 12/01/2023    PROT 6.7 01/18/2024    PROT 5.7 (L) 12/29/2018    PROT 6.0 (L) 12/27/2018    BILITOT 0.5 01/18/2024    BILITOT 0.4 12/29/2018    BILITOT 0.6 12/27/2018    ALKPHOS 46 01/18/2024    ALKPHOS 71 12/29/2018    ALKPHOS 50 12/27/2018    ALT 10 01/18/2024    ALT 87 (H) 12/29/2018    ALT 66 (H) 12/27/2018    AST 18 01/18/2024     (H) 12/29/2018     (H) 12/27/2018    GLUCOSE 93 03/07/2024    GLUCOSE 111 (H) 01/18/2024    GLUCOSE 97 12/01/2023    CHOL 257 (H) 12/01/2023    CHOL 218 (H) 03/22/2022    LDLF 101 (H) 03/22/2022    CHHDL 2.6 12/01/2023    CHHDL 2.1 03/22/2022     BI mammo bilateral screening tomosynthesis  Narrative: Interpreted By:  Manju Nogueira,   STUDY:  BI MAMMO BILATERAL SCREENING TOMOSYNTHESIS;  12/21/2023 3:30 pm      ACCESSION NUMBER(S):  [id number]      ORDERING CLINICIAN:  DEBBY SHERIFF      INDICATION:  Screening.      COMPARISON:  All prior mammograms that are available at the time of interpretation.      FINDINGS:  2D and tomosynthesis images were reviewed at 1 mm slice thickness.      Density:  There are areas of scattered fibroglandular tissue.      No suspicious masses or calcifications are identified.      Impression: No mammographic evidence of malignancy.      BI-RADS CATEGORY:  BI-RADS Category:  1 Negative.  Recommendation:  Routine Screening Mammogram in 1 Year.  Recommended Date:  1 Year.  Laterality:  Bilateral.      For any future breast imaging appointments, please call [phone] ([phone]).          MACRO:  None      Signed by: Manju Nogueira 12/27/2023 4:05 PM  Dictation workstation:   [id number]           Kajal Null is a 89 y.o. female for follow up of lymphocytic colitis.   Currently asymptomatic, actually is constipated.   Will use Miralax, adjust dose prn.   Follow up with Dr. Madrigal for CKD.      Dr. Hailee Cruz to follow  for GI after my [facility]      Darren Guido MD

## 2024-03-30 PROBLEM — N81.89 PELVIC FLOOR WEAKNESS: Status: ACTIVE | Noted: 2024-03-30

## 2024-03-30 PROBLEM — R60.9 PERIPHERAL EDEMA: Status: ACTIVE | Noted: 2024-03-30

## 2024-03-30 PROBLEM — R10.9 ABDOMINAL PAIN: Status: ACTIVE | Noted: 2024-03-30

## 2024-03-30 PROBLEM — H16.149 PUNCTATE KERATITIS: Status: ACTIVE | Noted: 2023-04-11

## 2024-03-30 PROBLEM — Z86.79 HISTORY OF HYPERTENSION: Status: ACTIVE | Noted: 2023-03-17

## 2024-03-30 PROBLEM — D64.9 ANEMIA: Status: ACTIVE | Noted: 2024-03-30

## 2024-03-30 PROBLEM — R05.9 COUGH: Status: ACTIVE | Noted: 2024-03-30

## 2024-03-30 PROBLEM — H04.129 DRY EYE SYNDROME: Status: ACTIVE | Noted: 2023-04-11

## 2024-03-30 PROBLEM — R49.0 DYSPHONIA: Status: ACTIVE | Noted: 2017-10-18

## 2024-03-30 PROBLEM — C32.0: Status: ACTIVE | Noted: 2023-03-17

## 2024-03-30 PROBLEM — R19.5 LOOSE STOOLS: Status: ACTIVE | Noted: 2024-03-30

## 2024-03-30 PROBLEM — T23.209A SECOND DEGREE BURN OF HAND: Status: ACTIVE | Noted: 2024-03-30

## 2024-03-30 PROBLEM — E78.49 OTHER HYPERLIPIDEMIA: Status: ACTIVE | Noted: 2022-12-13

## 2024-03-30 PROBLEM — T63.441A BEE STING: Status: ACTIVE | Noted: 2024-03-30

## 2024-03-30 PROBLEM — K21.9 LPRD (LARYNGOPHARYNGEAL REFLUX DISEASE): Status: ACTIVE | Noted: 2022-03-09

## 2024-03-30 PROBLEM — E78.01 FAMILIAL HYPERCHOLESTEROLEMIA: Status: ACTIVE | Noted: 2024-03-30

## 2024-03-30 PROBLEM — M79.609 LIMB PAIN: Status: ACTIVE | Noted: 2024-03-30

## 2024-03-30 PROBLEM — R63.4 WEIGHT LOSS: Status: ACTIVE | Noted: 2024-03-30

## 2024-03-30 PROBLEM — K57.30 DVRTCLOS OF LG INT W/O PERFORATION OR ABSCESS W/O BLEEDING: Status: ACTIVE | Noted: 2024-03-30

## 2024-03-30 PROBLEM — R06.02 SHORTNESS OF BREATH: Status: ACTIVE | Noted: 2024-03-30

## 2024-03-30 PROBLEM — M19.019 SHOULDER ARTHRITIS: Status: ACTIVE | Noted: 2024-03-30

## 2024-03-30 PROBLEM — K21.9 GERD (GASTROESOPHAGEAL REFLUX DISEASE): Status: ACTIVE | Noted: 2024-03-30

## 2024-03-30 PROBLEM — J06.9 UPPER RESPIRATORY INFECTION, ACUTE: Status: ACTIVE | Noted: 2024-03-30

## 2024-03-30 PROBLEM — M54.50 LOWER BACK PAIN: Status: ACTIVE | Noted: 2024-03-30

## 2024-03-30 PROBLEM — N39.0 URINARY TRACT INFECTION: Status: ACTIVE | Noted: 2023-10-11

## 2024-03-30 PROBLEM — N95.0 POST-MENOPAUSAL BLEEDING: Status: ACTIVE | Noted: 2024-03-30

## 2024-03-30 PROBLEM — R39.15 URINARY URGENCY: Status: ACTIVE | Noted: 2024-03-30

## 2024-03-30 PROBLEM — I35.1 NONRHEUMATIC AORTIC VALVE INSUFFICIENCY: Status: ACTIVE | Noted: 2024-03-30

## 2024-03-30 PROBLEM — K52.839 MICROSCOPIC COLITIS: Status: ACTIVE | Noted: 2024-03-30

## 2024-03-30 PROBLEM — F51.04 CHRONIC INSOMNIA: Status: ACTIVE | Noted: 2024-03-30

## 2024-03-30 PROBLEM — K64.4 EXTERNAL HEMORRHOIDS: Status: ACTIVE | Noted: 2024-03-30

## 2024-03-30 PROBLEM — M51.369 LUMBAR DISC NARROWING: Status: ACTIVE | Noted: 2024-03-30

## 2024-03-30 PROBLEM — R19.7 DIARRHEA: Status: ACTIVE | Noted: 2024-03-30

## 2024-03-30 PROBLEM — J45.909 ASTHMA (HHS-HCC): Status: ACTIVE | Noted: 2024-03-30

## 2024-03-30 PROBLEM — N39.3 URINARY, INCONTINENCE, STRESS FEMALE: Status: ACTIVE | Noted: 2024-03-30

## 2024-03-30 PROBLEM — R19.4 INCREASED BOWEL FREQUENCY: Status: ACTIVE | Noted: 2024-03-30

## 2024-03-30 PROBLEM — R35.0 URINARY FREQUENCY: Status: ACTIVE | Noted: 2024-03-30

## 2024-03-30 PROBLEM — R74.8 ELEVATED LIVER ENZYMES: Status: ACTIVE | Noted: 2024-03-30

## 2024-03-30 PROBLEM — N92.6 IRREGULAR MENSTRUAL CYCLE: Status: ACTIVE | Noted: 2024-03-30

## 2024-03-30 PROBLEM — E87.1 HYPONATREMIA: Status: ACTIVE | Noted: 2023-12-01

## 2024-03-30 PROBLEM — R01.1 MURMUR: Status: ACTIVE | Noted: 2022-12-13

## 2024-03-30 PROBLEM — R09.89 ABNORMAL LUNG SOUNDS: Status: ACTIVE | Noted: 2024-03-30

## 2024-03-30 PROBLEM — Z96.1 PSEUDOPHAKIA: Status: ACTIVE | Noted: 2023-04-11

## 2024-03-30 PROBLEM — H35.369 RETINAL DRUSEN: Status: ACTIVE | Noted: 2023-04-11

## 2024-03-30 PROBLEM — Z86.39 HISTORY OF ELEVATED LIPIDS: Status: ACTIVE | Noted: 2024-03-30

## 2024-03-30 PROBLEM — Z85.118 HISTORY OF PRIMARY MALIGNANT NEOPLASM OF LUNG: Status: ACTIVE | Noted: 2023-03-17

## 2024-03-30 PROBLEM — N18.4 CKD (CHRONIC KIDNEY DISEASE) STAGE 4, GFR 15-29 ML/MIN (MULTI): Status: ACTIVE | Noted: 2022-12-13

## 2024-03-30 PROBLEM — H52.4 PRESBYOPIA: Status: ACTIVE | Noted: 2023-04-11

## 2024-03-30 PROBLEM — H81.10 BPV (BENIGN POSITIONAL VERTIGO): Status: ACTIVE | Noted: 2024-03-30

## 2024-03-30 PROBLEM — R35.1 NOCTURIA: Status: ACTIVE | Noted: 2024-03-30

## 2024-03-30 PROBLEM — R15.1 FECAL SMEARING: Status: ACTIVE | Noted: 2024-03-30

## 2024-03-30 PROBLEM — K58.9 IRRITABLE BOWEL SYNDROME: Status: ACTIVE | Noted: 2024-03-30

## 2024-03-30 PROBLEM — I10 PRIMARY HYPERTENSION: Status: ACTIVE | Noted: 2022-12-13

## 2024-03-30 PROBLEM — J30.1 SEASONAL ALLERGIC RHINITIS DUE TO POLLEN: Status: ACTIVE | Noted: 2022-03-09

## 2024-03-30 PROBLEM — M51.36 LUMBAR DISC NARROWING: Status: ACTIVE | Noted: 2024-03-30

## 2024-03-30 PROBLEM — M51.369 DEGENERATION OF LUMBAR INTERVERTEBRAL DISC: Status: ACTIVE | Noted: 2023-03-17

## 2024-03-30 PROBLEM — M11.269 CHONDROCALCINOSIS OF KNEE: Status: ACTIVE | Noted: 2024-03-30

## 2024-03-30 PROBLEM — F41.9 ANXIETY: Status: ACTIVE | Noted: 2024-03-30

## 2024-03-30 PROBLEM — M46.1 SACROILIITIS (CMS-HCC): Status: ACTIVE | Noted: 2024-03-30

## 2024-03-30 PROBLEM — M54.17 LUMBOSACRAL RADICULOPATHY AT L4: Status: ACTIVE | Noted: 2024-03-30

## 2024-03-30 PROBLEM — D02.0 CARCINOMA IN SITU OF LARYNX: Status: ACTIVE | Noted: 2017-10-18

## 2024-03-30 PROBLEM — H35.379 MACULAR PUCKERING OF RETINA: Status: ACTIVE | Noted: 2023-04-11

## 2024-03-30 PROBLEM — K57.32 DIVERTICULITIS OF COLON: Status: ACTIVE | Noted: 2024-03-30

## 2024-03-30 PROBLEM — Z86.39 HISTORY OF HYPERCHOLESTEROLEMIA: Status: ACTIVE | Noted: 2023-03-17

## 2024-03-30 PROBLEM — M17.12 ARTHRITIS OF LEFT KNEE: Status: ACTIVE | Noted: 2024-03-30

## 2024-03-30 PROBLEM — J38.3 VOCAL CORD LEUKOPLAKIA: Status: ACTIVE | Noted: 2022-03-09

## 2024-03-30 PROBLEM — E66.3 OVERWEIGHT: Status: ACTIVE | Noted: 2023-03-17

## 2024-03-30 PROBLEM — R30.0 DYSURIA: Status: ACTIVE | Noted: 2024-03-30

## 2024-03-30 PROBLEM — J30.9 ALLERGIC RHINITIS: Status: ACTIVE | Noted: 2024-03-30

## 2024-03-30 PROBLEM — H52.10 MYOPIA: Status: ACTIVE | Noted: 2023-04-11

## 2024-03-30 PROBLEM — J20.9 ACUTE BRONCHITIS: Status: ACTIVE | Noted: 2024-03-30

## 2024-03-30 PROBLEM — Z86.2 HISTORY OF ANEMIA: Status: ACTIVE | Noted: 2022-12-13

## 2024-03-30 PROBLEM — N30.90 CYSTITIS: Status: ACTIVE | Noted: 2024-03-30

## 2024-03-30 PROBLEM — N95.2 ATROPHIC VAGINITIS: Status: ACTIVE | Noted: 2024-03-30

## 2024-03-30 PROBLEM — N18.30 STAGE 3 CHRONIC KIDNEY DISEASE (MULTI): Status: ACTIVE | Noted: 2023-12-01

## 2024-03-30 PROBLEM — D02.0: Status: ACTIVE | Noted: 2024-03-30

## 2024-03-30 PROBLEM — R60.0 PERIPHERAL EDEMA: Status: ACTIVE | Noted: 2024-03-30

## 2024-03-30 PROBLEM — M51.36 DEGENERATION OF LUMBAR INTERVERTEBRAL DISC: Status: ACTIVE | Noted: 2023-03-17

## 2024-04-10 ENCOUNTER — APPOINTMENT (OUTPATIENT)
Dept: GERIATRIC MEDICINE | Facility: CLINIC | Age: 89
End: 2024-04-10
Payer: MEDICARE

## 2024-04-12 LAB
APPEARANCE UR: CLEAR
BILIRUB UR STRIP.AUTO-MCNC: NEGATIVE MG/DL
COLOR UR: ABNORMAL
GLUCOSE UR STRIP.AUTO-MCNC: NORMAL MG/DL
HYALINE CASTS #/AREA URNS AUTO: ABNORMAL /LPF
KETONES UR STRIP.AUTO-MCNC: NEGATIVE MG/DL
LEUKOCYTE ESTERASE UR QL STRIP.AUTO: ABNORMAL
MUCOUS THREADS #/AREA URNS AUTO: ABNORMAL /LPF
NITRITE UR QL STRIP.AUTO: NEGATIVE
PH UR STRIP.AUTO: 5 [PH]
PROT UR STRIP.AUTO-MCNC: NEGATIVE MG/DL
RBC # UR STRIP.AUTO: NEGATIVE /UL
RBC #/AREA URNS AUTO: ABNORMAL /HPF
SP GR UR STRIP.AUTO: 1.02
SQUAMOUS #/AREA URNS AUTO: ABNORMAL /HPF
UROBILINOGEN UR STRIP.AUTO-MCNC: NORMAL MG/DL
WBC #/AREA URNS AUTO: ABNORMAL /HPF

## 2024-04-12 PROCEDURE — 81001 URINALYSIS AUTO W/SCOPE: CPT

## 2024-04-12 PROCEDURE — 87086 URINE CULTURE/COLONY COUNT: CPT

## 2024-04-13 LAB — HOLD SPECIMEN: NORMAL

## 2024-04-14 LAB — BACTERIA UR CULT: NORMAL

## 2024-05-08 ENCOUNTER — APPOINTMENT (OUTPATIENT)
Dept: PRIMARY CARE | Facility: CLINIC | Age: 89
End: 2024-05-08
Payer: MEDICARE

## 2024-05-08 ENCOUNTER — LAB (OUTPATIENT)
Dept: LAB | Facility: LAB | Age: 89
End: 2024-05-08
Payer: MEDICARE

## 2024-05-08 DIAGNOSIS — N18.4 CHRONIC KIDNEY DISEASE, STAGE 4 (SEVERE) (MULTI): ICD-10-CM

## 2024-05-08 DIAGNOSIS — N18.4 CHRONIC KIDNEY DISEASE, STAGE 4 (SEVERE) (MULTI): Primary | ICD-10-CM

## 2024-05-08 LAB
ALBUMIN SERPL BCP-MCNC: 3.8 G/DL (ref 3.4–5)
ANION GAP SERPL CALC-SCNC: 14 MMOL/L (ref 10–20)
BUN SERPL-MCNC: 58 MG/DL (ref 6–23)
CALCIUM SERPL-MCNC: 9.6 MG/DL (ref 8.6–10.6)
CHLORIDE SERPL-SCNC: 104 MMOL/L (ref 98–107)
CO2 SERPL-SCNC: 24 MMOL/L (ref 21–32)
CREAT SERPL-MCNC: 2.38 MG/DL (ref 0.5–1.05)
EGFRCR SERPLBLD CKD-EPI 2021: 19 ML/MIN/1.73M*2
GLUCOSE SERPL-MCNC: 86 MG/DL (ref 74–99)
PHOSPHATE SERPL-MCNC: 4.3 MG/DL (ref 2.5–4.9)
POTASSIUM SERPL-SCNC: 4.8 MMOL/L (ref 3.5–5.3)
SODIUM SERPL-SCNC: 137 MMOL/L (ref 136–145)

## 2024-05-08 PROCEDURE — 36415 COLL VENOUS BLD VENIPUNCTURE: CPT

## 2024-05-08 PROCEDURE — 80069 RENAL FUNCTION PANEL: CPT

## 2024-05-23 ENCOUNTER — OFFICE VISIT (OUTPATIENT)
Dept: PRIMARY CARE | Facility: CLINIC | Age: 89
End: 2024-05-23
Payer: MEDICARE

## 2024-05-23 VITALS
HEIGHT: 59 IN | BODY MASS INDEX: 23.59 KG/M2 | WEIGHT: 117 LBS | SYSTOLIC BLOOD PRESSURE: 126 MMHG | DIASTOLIC BLOOD PRESSURE: 66 MMHG | HEART RATE: 68 BPM | OXYGEN SATURATION: 96 %

## 2024-05-23 DIAGNOSIS — M53.3 COCCYX PAIN: ICD-10-CM

## 2024-05-23 DIAGNOSIS — R01.1 MURMUR, CARDIAC: Primary | ICD-10-CM

## 2024-05-23 DIAGNOSIS — M25.551 RIGHT HIP PAIN: ICD-10-CM

## 2024-05-23 PROCEDURE — 1159F MED LIST DOCD IN RCRD: CPT | Performed by: STUDENT IN AN ORGANIZED HEALTH CARE EDUCATION/TRAINING PROGRAM

## 2024-05-23 PROCEDURE — 1036F TOBACCO NON-USER: CPT | Performed by: STUDENT IN AN ORGANIZED HEALTH CARE EDUCATION/TRAINING PROGRAM

## 2024-05-23 PROCEDURE — 3074F SYST BP LT 130 MM HG: CPT | Performed by: STUDENT IN AN ORGANIZED HEALTH CARE EDUCATION/TRAINING PROGRAM

## 2024-05-23 PROCEDURE — 3078F DIAST BP <80 MM HG: CPT | Performed by: STUDENT IN AN ORGANIZED HEALTH CARE EDUCATION/TRAINING PROGRAM

## 2024-05-23 PROCEDURE — 1160F RVW MEDS BY RX/DR IN RCRD: CPT | Performed by: STUDENT IN AN ORGANIZED HEALTH CARE EDUCATION/TRAINING PROGRAM

## 2024-05-23 PROCEDURE — 99214 OFFICE O/P EST MOD 30 MIN: CPT | Performed by: STUDENT IN AN ORGANIZED HEALTH CARE EDUCATION/TRAINING PROGRAM

## 2024-05-23 RX ORDER — ALBUTEROL SULFATE 90 UG/1
2 AEROSOL, METERED RESPIRATORY (INHALATION) EVERY 4 HOURS PRN
COMMUNITY
Start: 2022-05-02

## 2024-05-23 RX ORDER — DEXTROMETHORPHAN HYDROBROMIDE, GUAIFENESIN 5; 100 MG/5ML; MG/5ML
650 LIQUID ORAL EVERY 8 HOURS PRN
COMMUNITY

## 2024-05-23 RX ORDER — DIPHENHYDRAMINE HCL 25 MG
25 TABLET ORAL NIGHTLY PRN
COMMUNITY

## 2024-05-23 ASSESSMENT — ENCOUNTER SYMPTOMS
LOSS OF SENSATION IN FEET: 1
OCCASIONAL FEELINGS OF UNSTEADINESS: 0
DEPRESSION: 0

## 2024-05-23 NOTE — PROGRESS NOTES
Subjective   Patient ID: Kajal Null is a 89 y.o. female with lymphocytic colitis, laryngeal cancer who presents to [facility]. Previously saw Dr. Nieves. Seen with son Oren.    HPI  Concerns today:  #Coccygeal pain  -States her body hurts all over  -Worries it is related to incontinence pads    #Swelling of legs and feet  -Has been ongoing for at least a year   -Compression stockings are hard to get on, so not always using them  -Tries to elevate    #Right leg pain  -Mostly in R hip  -Episodic pain on right from bearing more weight on it   -3 weeks    #Shingles post herpetic neuralgia   -Has had shingles symptoms since 2/21  -The rash is better, still has the pain   -Still hurts and itches  -Dermatologist sent steroid cream    Chronic issues:  #Lymphocytic colitis  -Often constipated  -Plans to see Dr. Cruz, previously saw Dr. Guido    #Laryngeal cancer   -Sees oncology at [facility]-- Dr. 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 Madrigal    #Anemia  -Macrocytic with elevated ferritin, normal iron levels now, previously low sat, TIBC has always been normal   -B12 normal in 2019, no folate done in the past   -Getting a special kind of blood test scheduled, she is not sure, might be a tagged RBC scan    #Venous stasis     #Recurrent UTIs    #S/p R shoulder replacement  -L shoulder hurting now     Health Maintenance:  Colon cancer screening: Colonoscopy 2017 with lymphocytic colitis   Breast cancer screening: Mammogram Dec 2023 normal   Osteoporosis screening: normal dexa 2022  Immunizations: Pneumococcal up to date, shingrix? Tdap?    Social history:  -Lives by herself   -One son Oren in town, other son William is an adolescent doctor  -Hunter passed away      Objective   Visit Vitals  /66 (BP Location: Left arm, Patient Position: Sitting, BP Cuff Size: Adult)   Pulse 68  "  Ht 1.499 m (4' 11\")   Wt 53.1 kg (117 lb)   SpO2 96%   BMI 23.63 kg/m²   OB Status Postmenopausal   Smoking Status Former   BSA 1.49 m²      Physical Exam  General: Well appearing, conversational, in no acute distress  HEENT: EOMI, PERRL, nares patent without congestion, MMM  CV: RRR, loud murmur   Resp: Lungs CTAB, normal work of breathing  GI: Soft, nondistended, nontender, BS+   Ext: 1+ lower ext edema bilaterally  Skin: Warm, dry, shingles rash on right flank with no active lesions, pink healing skin, normal sacral skin without any ulcers  Neuro: Awake, alert, oriented x3, moving all 4 extremities, nonfocal, normal gait, ambulates without assistance  Psych: Appropriate mood and affect          Assessment/Plan    Kajal Null is a 89 y.o. female with lymphocytic colitis, laryngeal cancer who presents to [facility]. Previously saw Dr. Nieves. Seen with son Oren.    Concerns today:  #Coccygeal pain, right hip pain  -Normal skin exam, no ulcers  -Will get x-ray of coccyx and right hip    #Swelling of legs and feet  -Has loud murmur as well  -Will get echo    #Shingles post herpetic neuralgia   -Has had shingles symptoms since 2/21  -Slowly improving  -Will try steroid ointment, discussed possibly trying gabapentin, deferred at this time  -Recommended increasing tylenol to 650mg TID    Chronic issues:  #Lymphocytic colitis  -Often constipated  -Plans to see Dr. Cruz, previously saw Dr. Guido    #Laryngeal cancer   -Sees oncology at [facility]-- Dr. 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 Madrigal    #Anemia  -Macrocytic with elevated ferritin, normal iron levels now, previously low sat, TIBC has always been normal   -B12 normal in 2019, no folate done in the past   -Getting a special kind of blood test scheduled, she is not sure, might be a tagged RBC scan  -Will try to " clarify  -Will get B12 and folate with next set of labs     #Venous stasis     #Recurrent UTIs    #S/p R shoulder replacement    Health Maintenance:  Colon cancer screening: Colonoscopy 2017 with lymphocytic colitis   Breast cancer screening: Mammogram Dec 2023 normal   Osteoporosis screening: normal dexa 2022  Immunizations: Pneumococcal up to date, shingrix? Tdap?-- will discuss at next visit         Problem List Items Addressed This Visit    None  Visit Diagnoses       Murmur, cardiac    -  Primary    Relevant Orders    Transthoracic Echo (TTE) Complete    Right hip pain        Relevant Orders    XR hip right with pelvis when performed 2 or 3 views (Completed)    Coccyx pain        Relevant Orders    XR sacrum coccyx 2+ views (Completed)                 Court Jerome MD MPH

## 2024-05-23 NOTE — PATIENT INSTRUCTIONS
Thank you for coming today Kajal!    Please get an x-ray of your hips and coccyx, as well as an echocardiogram. You can get this done on the lower level in [address]. The phone number is [phone].     Please increase your tylenol to 650mg three times a day.    Let's plan to follow up at the end of June or sooner if needed! I will be in touch with your results.

## 2024-05-24 ENCOUNTER — HOSPITAL ENCOUNTER (OUTPATIENT)
Dept: RADIOLOGY | Facility: HOSPITAL | Age: 89
Discharge: HOME | End: 2024-05-24
Payer: MEDICARE

## 2024-05-24 DIAGNOSIS — M53.3 COCCYX PAIN: ICD-10-CM

## 2024-05-24 DIAGNOSIS — M25.551 RIGHT HIP PAIN: ICD-10-CM

## 2024-05-24 PROCEDURE — 72220 X-RAY EXAM SACRUM TAILBONE: CPT | Performed by: RADIOLOGY

## 2024-05-24 PROCEDURE — 72220 X-RAY EXAM SACRUM TAILBONE: CPT

## 2024-05-24 PROCEDURE — 73502 X-RAY EXAM HIP UNI 2-3 VIEWS: CPT | Mod: RIGHT SIDE | Performed by: RADIOLOGY

## 2024-05-24 PROCEDURE — 73502 X-RAY EXAM HIP UNI 2-3 VIEWS: CPT | Mod: RT

## 2024-06-07 ENCOUNTER — HOSPITAL ENCOUNTER (OUTPATIENT)
Dept: CARDIOLOGY | Facility: CLINIC | Age: 89
Discharge: HOME | End: 2024-06-07
Payer: MEDICARE

## 2024-06-07 DIAGNOSIS — R60.0 LOCALIZED EDEMA: ICD-10-CM

## 2024-06-07 DIAGNOSIS — R01.1 MURMUR, CARDIAC: ICD-10-CM

## 2024-06-07 LAB
AORTIC VALVE MEAN GRADIENT: 9.3 MMHG
AORTIC VALVE PEAK VELOCITY: 2.22 M/S
AV PEAK GRADIENT: 19.6 MMHG
AVA (PEAK VEL): 1.7 CM2
AVA (VTI): 1.73 CM2
EJECTION FRACTION APICAL 4 CHAMBER: 65.7
LEFT ATRIUM VOLUME AREA LENGTH INDEX BSA: 46.7 ML/M2
LEFT VENTRICLE INTERNAL DIMENSION DIASTOLE: 3.62 CM (ref 3.5–6)
LEFT VENTRICULAR OUTFLOW TRACT DIAMETER: 2.01 CM
LV EJECTION FRACTION BIPLANE: 67 %
MITRAL VALVE E/A RATIO: 0.69
RIGHT VENTRICLE FREE WALL PEAK S': 14.65 CM/S
RIGHT VENTRICLE PEAK SYSTOLIC PRESSURE: 30.5 MMHG
TRICUSPID ANNULAR PLANE SYSTOLIC EXCURSION: 1.9 CM

## 2024-06-07 PROCEDURE — 93306 TTE W/DOPPLER COMPLETE: CPT | Performed by: INTERNAL MEDICINE

## 2024-06-07 PROCEDURE — 93306 TTE W/DOPPLER COMPLETE: CPT

## 2024-06-30 DIAGNOSIS — E78.2 MIXED HYPERLIPIDEMIA: ICD-10-CM

## 2024-07-02 RX ORDER — ATORVASTATIN CALCIUM 40 MG/1
40 TABLET, FILM COATED ORAL DAILY
Qty: 90 TABLET | Refills: 1 | Status: SHIPPED | OUTPATIENT
Start: 2024-07-02

## 2024-07-10 DIAGNOSIS — E87.1 HYPONATREMIA: Primary | ICD-10-CM

## 2024-07-16 ENCOUNTER — HOSPITAL ENCOUNTER (OUTPATIENT)
Dept: RADIOLOGY | Facility: CLINIC | Age: 89
Discharge: HOME | End: 2024-07-16
Payer: MEDICARE

## 2024-07-16 ENCOUNTER — OFFICE VISIT (OUTPATIENT)
Dept: ORTHOPEDIC SURGERY | Facility: CLINIC | Age: 89
End: 2024-07-16
Payer: MEDICARE

## 2024-07-16 DIAGNOSIS — M25.521 ELBOW PAIN, RIGHT: ICD-10-CM

## 2024-07-16 DIAGNOSIS — M70.21 OLECRANON BURSITIS OF RIGHT ELBOW: Primary | ICD-10-CM

## 2024-07-16 PROCEDURE — 73080 X-RAY EXAM OF ELBOW: CPT | Mod: RT

## 2024-07-16 PROCEDURE — 1159F MED LIST DOCD IN RCRD: CPT | Performed by: FAMILY MEDICINE

## 2024-07-16 PROCEDURE — 1036F TOBACCO NON-USER: CPT | Performed by: FAMILY MEDICINE

## 2024-07-16 PROCEDURE — 20605 DRAIN/INJ JOINT/BURSA W/O US: CPT | Performed by: FAMILY MEDICINE

## 2024-07-16 PROCEDURE — 99203 OFFICE O/P NEW LOW 30 MIN: CPT | Performed by: FAMILY MEDICINE

## 2024-07-16 PROCEDURE — 73080 X-RAY EXAM OF ELBOW: CPT | Mod: RIGHT SIDE | Performed by: RADIOLOGY

## 2024-07-16 RX ORDER — LIDOCAINE HYDROCHLORIDE 10 MG/ML
1 INJECTION INFILTRATION; PERINEURAL
Status: COMPLETED | OUTPATIENT
Start: 2024-07-16 | End: 2024-07-16

## 2024-07-16 NOTE — PROGRESS NOTES
** Please excuse any errors in grammar or translation related to this dictation. Voice recognition software was utilized to prepare this document. **    Assessment & Plan:  Dx: Acute traumatic olecranon bursitis    Reassured patient that no fracture of her elbow is present.  She has full active range of motion at the elbow.  In regards to the bursitis, discussed options for continued observation, application of compression wrap, and/or completion of aspiration.  Patient wanted to have bursa aspirated which was completed as below.  Ace bandage wrapped over the elbow following this and advised to wear it for the next week to prevent recurrence.  She can remove it temporarily for showering purposes.  Return precautions given.  Follow-up as needed.      Chief complaint:  Right elbow pain    HPI:  88 y/o presents with right elbow pain and swelling.  She is accompanied by her niece.  Onset of this injury was 3-4 weeks ago.  Mechanism of injury reported to be feeling dizzy, grabbing onto hand bar, possibly hitting elbow on something.  There is swelling present at the olecranon.  She has mild pain when putting pressure on the elbow.  Patient presents today for evaluation and further orthopedic management.  Denies previous surgery to this site.       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    Shoulder arthritis    Shortness of breath    Second degree burn of hand    Seasonal allergic rhinitis due to pollen    Sacroiliitis (CMS-HCC)    Retinal drusen    Urinary tract infection    Punctate keratitis    Pseudophakia    Primary malignant neoplasm of vocal cord (Multi)    Presbyopia    Post-menopausal bleeding    Peripheral edema    Pelvic floor weakness    Overweight    Other hyperlipidemia     Nonrheumatic aortic valve insufficiency    Nocturia    Myopia    Murmur    Macular puckering of retina    Lumbosacral radiculopathy at L4    Lumbar disc narrowing    LPRD (laryngopharyngeal reflux disease)    Lower back pain    Loose stools    Limb pain    Irritable bowel syndrome    Irregular menstrual cycle    Increased bowel frequency    Hyponatremia    History of primary malignant neoplasm of lung    History of hypertension    History of hypercholesterolemia    History of elevated lipids    History of anemia    Glottic web of larynx    GERD (gastroesophageal reflux disease)    Fecal smearing    Familial hypercholesterolemia    External hemorrhoids    Elevated liver enzymes    Dysuria    Dysphonia    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 bleeding    Dry eye syndrome    Microscopic colitis    Diverticulitis of colon    Diarrhea    Cystitis    Cough    CKD (chronic kidney disease) stage 4, GFR 15-29 ml/min (Multi)    Stage 3 chronic kidney disease (Multi)    Chronic insomnia    Chondrocalcinosis of knee    Degeneration of lumbar intervertebral disc    Carcinoma in situ of larynx    BPV (benign positional vertigo)    Primary hypertension    Bee sting    Atrophic vaginitis    Asthma (HHS-HCC)    Arthritis of left knee    Anxiety    Anemia    Allergic rhinitis    Acute bronchitis    Abdominal pain    Abnormal lung sounds     Past Surgical History:   Procedure Laterality Date    ADENOIDECTOMY      CATARACT EXTRACTION  09/04/2015    Cataract Surgery    COLONOSCOPY  09/04/2015    Complete Colonoscopy    OTHER SURGICAL HISTORY  09/04/2015    Direct Laryngoscopy With Stripping Of Vocal Cords    OTHER SURGICAL HISTORY  09/04/2015    Vocal Cordectomy    SHOULDER SURGERY  09/24/2013    Shoulder Surgery    TONSILLECTOMY  09/04/2015    Tonsillectomy     Current Outpatient Medications on File Prior to Visit   Medication Sig Dispense Refill    acetaminophen (Tylenol 8 HOUR) 650 mg ER tablet Take 1 tablet (650 mg) by  mouth every 8 hours if needed for mild pain (1 - 3). Do not crush, chew, or split.      albuterol 90 mcg/actuation inhaler Inhale 2 puffs every 4 hours if needed for shortness of breath.      atorvastatin (Lipitor) 40 mg tablet TAKE 1 TABLET BY MOUTH EVERY DAY AS DIRECTED 90 tablet 1    cephalexin (Keflex) 250 mg capsule Take one capsule daily after completing ciprofloxacin 90 capsule 2    cholecalciferol (Vitamin D-3) 50 mcg (2,000 unit) capsule Take by mouth early in the morning..      clobetasol (Temovate) 0.05 % cream Apply topically 2 times a day.      cranberry fruit 465 mg capsule Take 650 mg by mouth early in the morning..      diphenhydrAMINE (Sominex) 25 mg tablet Take 1 tablet (25 mg) by mouth as needed at bedtime for sleep.      estradiol (Estrace) 0.01 % (0.1 mg/gram) vaginal cream Insert into the vagina once daily.      famotidine (Pepcid) 20 mg tablet Take 1 tablet (20 mg) by mouth once daily at bedtime.      ferrous sulfate, 325 mg ferrous sulfate, tablet Take 65 mg by mouth once daily.      furosemide (Lasix) 20 mg tablet Take 3 tablets (60 mg) by mouth once daily.      mirabegron (Myrbetriq) 50 mg tablet extended release 24 hr 24 hr tablet Take 1 tablet (50 mg) by mouth once daily. 90 tablet 3    omeprazole (PriLOSEC) 20 mg DR capsule TAKE 1 CAPSULE BY MOUTH ONCE DAILY 90 capsule 1    spironolactone (Aldactone) 25 mg tablet Take 1 tablet (25 mg) by mouth once daily.       No current facility-administered medications on file prior to visit.         Exam:  RIGHT Elbow Exam:  Swelling overlying the olecranon process.  No erythema or warmth.  Nont-TTP over radial head, olecranon, medial epicondyle, lateral epicondyle  Flexion to [150] of 150deg, Extension to [0] of 0deg, Pronate/Supinate [90]/[90] of 90/90deg  [ 5]/5 strength elbow flexion and extension  SILT  [Negative] Tinel´s at Cubital tunnel  [Negative] Hook test    General Exam:  Constitutional - NAD, AAO x 3, conversing appropriately.  HEENT-  Normocephalic and atraumatic. EOMI, PERRLA, No scleral icterus. No facial deformities. Hearing grossly normal.  Lungs - Breathing non-labored with normal rate. No accessory muscle use.  CV - Extremities warm and well-perfused, brisk capillary refill present.   Neuro - CN II-XII grossly intact.    Results:  X-rays of right elbow obtained 7/16/2024 personally interpreted as no acute fractures with normal joint alignment.  Degenerative changes are present.  Soft tissue swelling overlying the olecranon consistent with bursitis.  Lab Results   Component Value Date    CREATININE 2.38 (H) 05/08/2024    EGFR 19 (L) 05/08/2024      Procedure:  Patient ID: Kajal Null is a 89 y.o. female.    M Inj/Asp: R olecranon bursa on 7/16/2024 3:47 PM  Indications: pain and joint swelling  Details: 18 G needle, posterior approach  Medications: 1 mL lidocaine 10 mg/mL (1 %)  Aspirate: 5 mL bloody  Outcome: tolerated well, no immediate complications  Procedure, treatment alternatives, risks and benefits explained, specific risks discussed. Consent was given by the patient. Immediately prior to procedure a time out was called to verify the correct patient, procedure, equipment, support staff and site/side marked as required. Patient was prepped and draped in the usual sterile fashion.

## 2024-07-18 ENCOUNTER — LAB (OUTPATIENT)
Dept: LAB | Facility: LAB | Age: 89
End: 2024-07-18
Payer: MEDICARE

## 2024-07-18 DIAGNOSIS — N39.0 CHRONIC UTI: ICD-10-CM

## 2024-07-18 PROCEDURE — 81001 URINALYSIS AUTO W/SCOPE: CPT

## 2024-07-18 PROCEDURE — 87086 URINE CULTURE/COLONY COUNT: CPT

## 2024-07-19 LAB
APPEARANCE UR: CLEAR
BACTERIA UR CULT: NORMAL
BILIRUB UR STRIP.AUTO-MCNC: NEGATIVE MG/DL
BROAD CASTS #/AREA UR COMP ASSIST: ABNORMAL /LPF
COLOR UR: ABNORMAL
GLUCOSE UR STRIP.AUTO-MCNC: NORMAL MG/DL
HOLD SPECIMEN: NORMAL
HYALINE CASTS #/AREA URNS AUTO: ABNORMAL /LPF
KETONES UR STRIP.AUTO-MCNC: NEGATIVE MG/DL
LEUKOCYTE ESTERASE UR QL STRIP.AUTO: ABNORMAL
NITRITE UR QL STRIP.AUTO: NEGATIVE
PH UR STRIP.AUTO: 5 [PH]
PROT UR STRIP.AUTO-MCNC: NEGATIVE MG/DL
RBC # UR STRIP.AUTO: NEGATIVE /UL
RBC #/AREA URNS AUTO: ABNORMAL /HPF
SP GR UR STRIP.AUTO: 1.01
UROBILINOGEN UR STRIP.AUTO-MCNC: NORMAL MG/DL
WBC #/AREA URNS AUTO: ABNORMAL /HPF

## 2024-07-25 ENCOUNTER — TELEMEDICINE (OUTPATIENT)
Dept: UROLOGY | Facility: CLINIC | Age: 89
End: 2024-07-25
Payer: MEDICARE

## 2024-07-25 DIAGNOSIS — R10.2 PELVIC PAIN: ICD-10-CM

## 2024-07-25 DIAGNOSIS — R35.0 URINARY FREQUENCY: ICD-10-CM

## 2024-07-25 DIAGNOSIS — N39.41 URGE URINARY INCONTINENCE: ICD-10-CM

## 2024-07-25 DIAGNOSIS — N39.0 CHRONIC UTI: Primary | ICD-10-CM

## 2024-07-25 DIAGNOSIS — R39.15 URGENCY OF URINATION: ICD-10-CM

## 2024-07-25 DIAGNOSIS — N81.89 PELVIC FLOOR WEAKNESS: ICD-10-CM

## 2024-07-25 DIAGNOSIS — N95.2 VAGINAL ATROPHY: ICD-10-CM

## 2024-07-25 PROCEDURE — 99442 PR PHYS/QHP TELEPHONE EVALUATION 11-20 MIN: CPT | Performed by: OBSTETRICS & GYNECOLOGY

## 2024-07-25 PROCEDURE — 1036F TOBACCO NON-USER: CPT | Performed by: OBSTETRICS & GYNECOLOGY

## 2024-07-25 PROCEDURE — 1159F MED LIST DOCD IN RCRD: CPT | Performed by: OBSTETRICS & GYNECOLOGY

## 2024-07-25 PROCEDURE — 1160F RVW MEDS BY RX/DR IN RCRD: CPT | Performed by: OBSTETRICS & GYNECOLOGY

## 2024-07-25 RX ORDER — MIRABEGRON 50 MG/1
50 TABLET, EXTENDED RELEASE ORAL DAILY
Qty: 90 TABLET | Refills: 3 | Status: SHIPPED | OUTPATIENT
Start: 2024-07-25 | End: 2025-07-25

## 2024-07-25 RX ORDER — ESTRADIOL 0.1 MG/G
CREAM VAGINAL
Qty: 42.5 G | Refills: 2 | Status: SHIPPED | OUTPATIENT
Start: 2024-07-25

## 2024-07-25 RX ORDER — CEPHALEXIN 250 MG/1
250 CAPSULE ORAL DAILY
Qty: 90 CAPSULE | Refills: 2 | Status: SHIPPED | OUTPATIENT
Start: 2024-07-25

## 2024-07-25 NOTE — PROGRESS NOTES
Subjective   Patient ID: Kajal Null is a 89 y.o. female who presents for No chief complaint on file.  Today's visit was done virtually after appropriate consent from the patient. Virtual or Telephone Consent     An interactive audio  telecommunication system which permits real time communications between the patient at home and provider (at the distant site) was utilized to provide this telehealth service. Greater than 10 minutes were spent discussing the patients concerns and coordinating care    HPI  This visit was performed through telemedicine  88-year-old with history of recurrent urinary tract infections, urinary frequency and urgency, vaginal atrophy, and pelvic floor weakness and pain.    The patient had a fall with injuries  to her hip.    She has a stage 4 CKD monitored closely.    She continues to note urinary urgency and frequency. She notes 0-1 episodes of nocturia but denies any enuresis. She voids 4 times during the day with rare episodes of incontinence. She has been utilizing Myrbetriq and is cost prohibitive at $300. The patient denies any UTI like symptoms today, she continues to utilize low cephalexin therapy,  cranberry extract  tablets for prevention of UTI.     She denies any vaginal complaints, no abnormal bleeding or discharge. She is utilizing the vaginal estrogen cream.     She denies any bowel related complaints, no fecal or flatal incontinence.    She has no other complaints.    From Previous note  This visit was performed through telemedicine  88-year-old with history of recurrent urinary tract infections, urinary frequency and urgency, vaginal atrophy, and pelvic floor weakness and pain.     Unfortunate the patient has had several urinary tract infections beginning and April, July, August, and December.  Recent E. coli has been resistant to trimethoprim and she was recently changed to daily cephalexin.  However she calls today with concerns regarding her medications.  She is  continuing her vaginal estrogen therapy, cranberry tablets, and denies any vaginal concerns.     She is a known case of colitis but denies any bowel related complaints, no fecal or flatal incontinence.     She has no other complaints.        From previous note   The following visit was performed to telemedicine  87-year-old with history of recurrent urinary tract infections, urinary frequency and urgency presenting for follow-up.     The patient was noted to have a urinary tract infection roughly 1 week ago. She is utilizing ciprofloxacin and is having difficulty taking this medication due to concomitant use of antacids. She denies any issues with the medication but has 3 more days of the Cipro. She is not using her Myrbetriq as it has become cost prohibitive but wishes to try this for 1 month to determine efficacy.      She is no longer using her cranberry extract tablets, vaginal estrogen therapy, and d-mannose therapy.     She does continue to note 2-3 episodes of nocturia. She is taking a diuretic in the morning due to worsening swelling of her lower extremities. She is following up with her nephrologist, cardiologist, and primary care physician regarding this.     She has no other complaints.     From previous note  Due to the Covid epidemic, the following visit was performed telemedicine.     85-year-old presenting for follow-up with history of recurrent urinary tract infections, urinary frequency and urgency.     The patient has completed 6 months of 250 mg cephalexin. She denies any UTI like symptoms today. She is utilizing her vaginal estrogen cream 3 times weekly. She is utilizing d-mannose and cranberry extract tablets. The patient was noted to have a urinary tract infection in July 2019. She's been disease-free since that time.      She is very satisfied with her present Myrbetric 50 mg. she has changed her utilization of this medication to take it at night and has noted near complete resolution of her  "nocturia complaints. She denies enuresis. She denies any complaints with her daytime urgency or frequency. She is overall very satisfied with this medication. She does wish for to be less expensive. She is spending $40 monthly for this medication.     She has no other complaints.        From previous note:  83yo referred by Dr. Carballo for frequent UTI and urinary frequency and urgency.      She was last seen by Dr. Carballo in December 2018 and more recently by Maru Walker in July 2019. She has taken Keflex 250mg daily for UTI suppression in the past. She is currently on Keflex for treatment of acute UTI. She was restarted on vaginal estrogen cream in July 2019 as well.     She started to experience dysuria, nocturia x 4 and urgency with small volume urine leakage (\"just a few drops\") at night a few weeks ago. She denies any baseline urgency, frequency or urinary incontinence when she is not infected.     She sees a Nephrologist for CKD. She drinks one cup of coffee in the morning, two glasses of water daily. She will work on increasing her water intake.     She takes budesonide for microscopic colitis. She currently has four soft BM daily. No blood or mucous per rectum. No constipation. She admits to bowel urgency from time to time, but no bowel incontinence. Of note, she is quite sensitive to antibiotics. She has experienced severe diarrhea with Bactrim and nitrofurantoin in the past. She has done well on Keflex. .      UA today = trace protein, trace leukocytes  PVR = 38cc     UTI history:   8/5/19 = inconclusive/ probable contamination  7/25/19 = +E coli (treated with Keflex 500mg BID x 7 days)  12/21/18 = +Klebsiella pneumonia (treated with Bactrim DScaused severe diarrhea and macrobid  {also caused severe diarrhea  3/1/18 = no growth  2/8/18 = no growth      Review of Systems    Objective   Physical Exam  Visit performed to telemedicine    Provider Impressions     88-year-old with history of recurrent urinary tract " infections, urinary frequency and urgency, vaginal atrophy, and pelvic floor weakness and pain.     #1 we again discussed treatment for chronic urinary tract infections. She has previously completed 6 months of cephalexin therapy. Unfortunately she has had E. coli and Klebsiella UTIs on 3 occasions since April 2023 but has been UTI free since December 2023. We again discussed vaginal hygiene.  She is not utilizing her vaginal estrogen therapy 3 times a week.  She has restarted her d-mannose therapy and cranberry extract tablets.  She was previously provided a new standing order for urinalysis and urine culture.  She will continue her daily low-dose cephalexin moving forward.  This was ordered now.     #2 the patient was previously noted to have a weak and and painful pelvic floor. We again discussed the benefits of pelvic floor physical therapy. She has been unable to follow-up.     #3 we again discussed her urinary urgency complaints. We again discussed the AUA OAB care pathway including first, second, third line therapies. The patient has utilized oxybutynin with significant dry mouth complaints as well as bothersome dry mouth complaints with trospium therapy.  She was receiving her Myrbetriq from [facility] and is overall satisfied with this therapy.  We discussed the importance of timed voiding.  We have previously discussed third line options including PTNS, Botox, and InterStim.  She would be most interested in proceeding with Botox moving forward.  A new prescription for Myrbetriq was sent to her pharmacy.  We will work on prior authorization or tier exemption to reduce the cost of this medication.  She may also benefit from Gemtesa therapy should this be less expensive.     #4 We again discussed the safety and efficacy of vaginal estrogen therapy. She will continue this 3 times a week moving forward.  A new prescription was called into her pharmacy.     #5 the patient will follow-up in 3 months.  We will  readdress third line therapies should her beta 3 agonist medication be cost prohibitive.     JUDIE Morton MD        Scribe Attestation  By signing my name below, I, Kermit Huerta   attest that this documentation has been prepared under the direction and in the presence of Aj Morton MD.

## 2024-08-13 ENCOUNTER — TELEPHONE (OUTPATIENT)
Dept: PRIMARY CARE | Facility: CLINIC | Age: 89
End: 2024-08-13

## 2024-08-13 ENCOUNTER — PATIENT MESSAGE (OUTPATIENT)
Dept: PRIMARY CARE | Facility: CLINIC | Age: 89
End: 2024-08-13
Payer: MEDICARE

## 2024-08-30 DIAGNOSIS — R41.3 MEMORY LOSS: Primary | ICD-10-CM

## 2024-09-19 ENCOUNTER — APPOINTMENT (OUTPATIENT)
Dept: PRIMARY CARE | Facility: CLINIC | Age: 89
End: 2024-09-19
Payer: MEDICARE

## 2024-09-19 VITALS
HEART RATE: 81 BPM | BODY MASS INDEX: 22.82 KG/M2 | OXYGEN SATURATION: 92 % | WEIGHT: 113 LBS | SYSTOLIC BLOOD PRESSURE: 130 MMHG | DIASTOLIC BLOOD PRESSURE: 80 MMHG

## 2024-09-19 DIAGNOSIS — R94.6 ABNORMAL RESULTS OF THYROID FUNCTION STUDIES: ICD-10-CM

## 2024-09-19 DIAGNOSIS — M54.17 LUMBOSACRAL RADICULOPATHY AT L4: ICD-10-CM

## 2024-09-19 DIAGNOSIS — R79.89 ELEVATED TSH: ICD-10-CM

## 2024-09-19 DIAGNOSIS — J84.10 PULMONARY FIBROSIS, UNSPECIFIED (MULTI): ICD-10-CM

## 2024-09-19 DIAGNOSIS — M46.1 SACROILIITIS (CMS-HCC): ICD-10-CM

## 2024-09-19 DIAGNOSIS — G62.9 NEUROPATHY: Primary | ICD-10-CM

## 2024-09-19 PROCEDURE — 99214 OFFICE O/P EST MOD 30 MIN: CPT | Performed by: STUDENT IN AN ORGANIZED HEALTH CARE EDUCATION/TRAINING PROGRAM

## 2024-09-19 PROCEDURE — 3079F DIAST BP 80-89 MM HG: CPT | Performed by: STUDENT IN AN ORGANIZED HEALTH CARE EDUCATION/TRAINING PROGRAM

## 2024-09-19 PROCEDURE — 3075F SYST BP GE 130 - 139MM HG: CPT | Performed by: STUDENT IN AN ORGANIZED HEALTH CARE EDUCATION/TRAINING PROGRAM

## 2024-09-19 RX ORDER — GABAPENTIN 300 MG/1
300 CAPSULE ORAL NIGHTLY
Qty: 90 CAPSULE | Refills: 0 | Status: SHIPPED | OUTPATIENT
Start: 2024-09-19 | End: 2024-09-25 | Stop reason: SDUPTHER

## 2024-09-19 NOTE — LETTER
September 19, 2024     Patient: Kajal Null   YOB: 1935   Date of Visit: 9/19/2024       To Whom It May Concern:    Kajal Null was seen in my clinic on 9/19/2024 at 12:20 pm. She was seen by her dentist today. It is medically necessary for her to have a tooth extraction due to risk of infection.     If you have any questions or concerns, please don't hesitate to call.         Sincerely,         Court Jerome MD MPH        CC: No Recipients

## 2024-09-19 NOTE — PROGRESS NOTES
Subjective   Patient ID: Kajal Null is a 89 y.o. female with lymphocytic colitis, laryngeal cancer s/p radiation, CKD, anemia, s/p R shoulder replacement who presents to Follow-up. Spoke with William on the phone during the visit.    HPI  Concerns today:  #Dental issue  She reports having a dental issue with the second tooth from the back on the upper level, which requires extraction.    #Coccyx and leg pain    The patient complains of persistent coccyx pain, even when standing, and leg pain due to a past accident. The patient experiences whole-body aches.    #Shingles  She has a history of shingles, which are no longer painful but remain itchy since January. She has been using Triamcinolone occasionally with limited effectiveness.    #COVID-19  The patient contracted COVID in August and took Paxlovid but stopped due to its taste. She has fully recovered.    #Sleep issues  The patient has been taking Benadryl at night for sleep and pain control but reports poor sleep quality.    #Forgetfulness  Per her sons, Shirley is becoming more forgetful. She has an appointment with a neuropsychologist in December.    Chronic issues:  #Lymphocytic colitis  -Often constipated  -Seeing Dr. Cruz, previously saw Dr. Guido    #Laryngeal cancer   -Sees oncology at [facility]-- Dr. 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 Madrigal    #Anemia  -Macrocytic with elevated ferritin, normal iron levels now, previously low sat, TIBC has always been normal   -B12 normal in 2019, no folate done in the past   -Getting a special kind of blood test scheduled, she is not sure, might be a tagged RBC scan    #Venous stasis     #Recurrent UTIs    #S/p R shoulder replacement  -L shoulder hurting now     Health Maintenance:  Colon cancer screening: Colonoscopy 2017 with lymphocytic colitis   Breast cancer screening:  Mammogram Dec 2023 normal   Osteoporosis screening: normal dexa 2022  Immunizations: Pneumococcal up to date, shingrix? Tdap?    Social history:  -Lives by herself   -One son Oren in town, other son William is an adolescent doctor in [place]  [facility]      Objective   Visit Vitals  /80   Pulse 81   Wt 51.3 kg (113 lb)   SpO2 92%   BMI 22.82 kg/m²   OB Status Postmenopausal   Smoking Status Former   BSA 1.46 m²      Physical Exam  General: Well appearing, conversational, in no acute distress  HEENT: EOMI, PERRL, nares patent without congestion, MMM  CV: RRR, loud murmur   Resp: Lungs CTAB, normal work of breathing  GI: Soft, nondistended, nontender, BS+   Ext: 1+ lower ext edema bilaterally  Skin: Warm, dry, healed previous shingles rash on right flank with no active lesions  Neuro: Awake, alert, oriented x3, moving all 4 extremities, nonfocal, normal gait, ambulates without assistance  Psych: Appropriate mood and affect        Assessment/Plan   Kajal Null is a 89 y.o. female with lymphocytic colitis, laryngeal cancer s/p radiation, CKD, anemia, s/p R shoulder replacement who presents to Follow-up.    For her pain and sleep issues, will trial gabapentin 300mg at night and stop benadryl    For itchiness from prior shingles, will try daily triamcinolone for the next week and aquaphor as well    Will recheck TSH with next set of labs     Has neuropsych testing in December     Problem List Items Addressed This Visit       Lumbosacral radiculopathy at L4    Relevant Medications    gabapentin (Neurontin) 300 mg capsule     Other Visit Diagnoses       Neuropathy    -  Primary    Relevant Medications    gabapentin (Neurontin) 300 mg capsule    Elevated TSH        Relevant Orders    TSH with reflex to Free T4 if abnormal    Abnormal results of thyroid function studies        Relevant Orders    TSH with reflex to Free T4 if abnormal                   Court Jerome MD MPH

## 2024-09-19 NOTE — PATIENT INSTRUCTIONS
Thank you for coming today Shirley!    I can write a letter for tooth extraction being medically necessary.    Please use triamcinolone on your shingles rash daily for the next week. Please use aquaphor on it as well!    You can take your B12 1000mcg every other day since your B12 level was good 2 months ago while on the 500mcg.    Let's start the gabapentin 300mg at night. STOP taking the benadryl. Please let me know if this helps with the pain and sleep.     Let's recheck your thyroid labs in a month after you see Dr. Madrigal. I think he will likely want blood work, so we can combine into one lab draw.     Please start working on some brain exercises! Let me know how the neuropsych testing goes.

## 2024-09-25 DIAGNOSIS — M54.17 LUMBOSACRAL RADICULOPATHY AT L4: ICD-10-CM

## 2024-09-25 DIAGNOSIS — G62.9 NEUROPATHY: ICD-10-CM

## 2024-09-25 PROBLEM — J84.10 PULMONARY FIBROSIS, UNSPECIFIED (MULTI): Status: ACTIVE | Noted: 2024-09-25

## 2024-09-25 RX ORDER — GABAPENTIN 100 MG/1
100 CAPSULE ORAL NIGHTLY
Qty: 30 CAPSULE | Refills: 0 | Status: SHIPPED | OUTPATIENT
Start: 2024-09-25 | End: 2024-10-25

## 2024-10-07 ENCOUNTER — LAB (OUTPATIENT)
Dept: LAB | Facility: LAB | Age: 89
End: 2024-10-07
Payer: MEDICARE

## 2024-10-07 DIAGNOSIS — N39.0 CHRONIC UTI: ICD-10-CM

## 2024-10-07 PROCEDURE — 87086 URINE CULTURE/COLONY COUNT: CPT

## 2024-10-07 PROCEDURE — 81001 URINALYSIS AUTO W/SCOPE: CPT

## 2024-10-08 LAB
APPEARANCE UR: CLEAR
BILIRUB UR STRIP.AUTO-MCNC: NEGATIVE MG/DL
COLOR UR: COLORLESS
GLUCOSE UR STRIP.AUTO-MCNC: NORMAL MG/DL
HOLD SPECIMEN: NORMAL
KETONES UR STRIP.AUTO-MCNC: NEGATIVE MG/DL
LEUKOCYTE ESTERASE UR QL STRIP.AUTO: ABNORMAL
MUCOUS THREADS #/AREA URNS AUTO: NORMAL /LPF
NITRITE UR QL STRIP.AUTO: NEGATIVE
PH UR STRIP.AUTO: 5 [PH]
PROT UR STRIP.AUTO-MCNC: NEGATIVE MG/DL
RBC # UR STRIP.AUTO: NEGATIVE /UL
RBC #/AREA URNS AUTO: NORMAL /HPF
SP GR UR STRIP.AUTO: 1.01
UROBILINOGEN UR STRIP.AUTO-MCNC: NORMAL MG/DL
WBC #/AREA URNS AUTO: NORMAL /HPF

## 2024-10-09 DIAGNOSIS — G62.9 NEUROPATHY: Primary | ICD-10-CM

## 2024-10-09 LAB — BACTERIA UR CULT: NORMAL

## 2024-10-09 RX ORDER — DULOXETIN HYDROCHLORIDE 30 MG/1
30 CAPSULE, DELAYED RELEASE ORAL DAILY
Qty: 30 CAPSULE | Refills: 11 | Status: SHIPPED | OUTPATIENT
Start: 2024-10-09 | End: 2025-10-09

## 2024-10-11 DIAGNOSIS — N89.8 VAGINAL ITCHING: Primary | ICD-10-CM

## 2024-10-11 RX ORDER — FLUCONAZOLE 150 MG/1
TABLET ORAL
Qty: 2 TABLET | Refills: 0 | Status: SHIPPED | OUTPATIENT
Start: 2024-10-11

## 2024-10-14 ENCOUNTER — TELEPHONE (OUTPATIENT)
Dept: UROLOGY | Facility: CLINIC | Age: 89
End: 2024-10-14

## 2024-10-14 NOTE — TELEPHONE ENCOUNTER
Setting this patient up with an appointment. She no longer feels the vaginal itching. She is taking her estradiol cream and Myrbetriq medication. Anything else you want me to contact her with?

## 2024-10-15 ENCOUNTER — TELEPHONE (OUTPATIENT)
Dept: PRIMARY CARE | Facility: CLINIC | Age: 89
End: 2024-10-15

## 2024-10-15 DIAGNOSIS — J84.10 PULMONARY FIBROSIS, UNSPECIFIED (MULTI): Primary | ICD-10-CM

## 2024-10-15 RX ORDER — ALBUTEROL SULFATE 90 UG/1
2 INHALANT RESPIRATORY (INHALATION) EVERY 4 HOURS PRN
Qty: 18 G | Refills: 2 | Status: SHIPPED | OUTPATIENT
Start: 2024-10-15

## 2024-10-21 ENCOUNTER — APPOINTMENT (OUTPATIENT)
Dept: UROLOGY | Facility: CLINIC | Age: 89
End: 2024-10-21
Payer: MEDICARE

## 2024-10-21 DIAGNOSIS — J84.10 PULMONARY FIBROSIS, UNSPECIFIED (MULTI): ICD-10-CM

## 2024-10-21 DIAGNOSIS — J45.909 ASTHMA, UNSPECIFIED ASTHMA SEVERITY, UNSPECIFIED WHETHER COMPLICATED, UNSPECIFIED WHETHER PERSISTENT (HHS-HCC): Primary | ICD-10-CM

## 2024-10-25 ENCOUNTER — APPOINTMENT (OUTPATIENT)
Dept: CARDIOLOGY | Facility: HOSPITAL | Age: 89
DRG: 844 | End: 2024-10-25
Payer: MEDICARE

## 2024-10-25 ENCOUNTER — APPOINTMENT (OUTPATIENT)
Dept: RADIOLOGY | Facility: HOSPITAL | Age: 89
DRG: 844 | End: 2024-10-25
Payer: MEDICARE

## 2024-10-25 ENCOUNTER — OFFICE VISIT (OUTPATIENT)
Dept: URGENT CARE | Age: 89
End: 2024-10-25
Payer: MEDICARE

## 2024-10-25 ENCOUNTER — HOSPITAL ENCOUNTER (INPATIENT)
Facility: HOSPITAL | Age: 89
DRG: 844 | End: 2024-10-25
Attending: EMERGENCY MEDICINE | Admitting: INTERNAL MEDICINE
Payer: MEDICARE

## 2024-10-25 VITALS
HEART RATE: 83 BPM | SYSTOLIC BLOOD PRESSURE: 117 MMHG | OXYGEN SATURATION: 92 % | RESPIRATION RATE: 23 BRPM | BODY MASS INDEX: 21.61 KG/M2 | TEMPERATURE: 97.5 F | WEIGHT: 107 LBS | DIASTOLIC BLOOD PRESSURE: 79 MMHG

## 2024-10-25 DIAGNOSIS — N39.0 CHRONIC UTI: ICD-10-CM

## 2024-10-25 DIAGNOSIS — I50.30 HEART FAILURE WITH PRESERVED EJECTION FRACTION, UNSPECIFIED HF CHRONICITY: ICD-10-CM

## 2024-10-25 DIAGNOSIS — J91.0 MALIGNANT PLEURAL EFFUSION (CMS-HCC): ICD-10-CM

## 2024-10-25 DIAGNOSIS — I50.41 ACUTE COMBINED SYSTOLIC (CONGESTIVE) AND DIASTOLIC (CONGESTIVE) HEART FAILURE: ICD-10-CM

## 2024-10-25 DIAGNOSIS — J90 PLEURAL EFFUSION: Primary | ICD-10-CM

## 2024-10-25 DIAGNOSIS — R06.02 SOB (SHORTNESS OF BREATH): Primary | ICD-10-CM

## 2024-10-25 DIAGNOSIS — I27.20 PULMONARY HYPERTENSION (MULTI): ICD-10-CM

## 2024-10-25 DIAGNOSIS — J90 PLEURAL EFFUSION: ICD-10-CM

## 2024-10-25 LAB
ALBUMIN SERPL BCP-MCNC: 3.7 G/DL (ref 3.4–5)
ALP SERPL-CCNC: 82 U/L (ref 33–136)
ALT SERPL W P-5'-P-CCNC: 19 U/L (ref 7–45)
ANION GAP SERPL CALC-SCNC: 16 MMOL/L (ref 10–20)
APPEARANCE UR: CLEAR
AST SERPL W P-5'-P-CCNC: 25 U/L (ref 9–39)
ATRIAL RATE: 97 BPM
BASOPHILS # BLD AUTO: 0.02 X10*3/UL (ref 0–0.1)
BASOPHILS NFR BLD AUTO: 0.3 %
BILIRUB SERPL-MCNC: 0.4 MG/DL (ref 0–1.2)
BILIRUB UR STRIP.AUTO-MCNC: NEGATIVE MG/DL
BNP SERPL-MCNC: 39 PG/ML (ref 0–99)
BUN SERPL-MCNC: 65 MG/DL (ref 6–23)
CALCIUM SERPL-MCNC: 9.7 MG/DL (ref 8.6–10.3)
CARDIAC TROPONIN I PNL SERPL HS: 12 NG/L (ref 0–13)
CARDIAC TROPONIN I PNL SERPL HS: 12 NG/L (ref 0–13)
CHLORIDE SERPL-SCNC: 101 MMOL/L (ref 98–107)
CO2 SERPL-SCNC: 22 MMOL/L (ref 21–32)
COLOR UR: COLORLESS
CREAT SERPL-MCNC: 2.57 MG/DL (ref 0.5–1.05)
EGFRCR SERPLBLD CKD-EPI 2021: 18 ML/MIN/1.73M*2
EOSINOPHIL # BLD AUTO: 0.08 X10*3/UL (ref 0–0.4)
EOSINOPHIL NFR BLD AUTO: 1.3 %
ERYTHROCYTE [DISTWIDTH] IN BLOOD BY AUTOMATED COUNT: 14 % (ref 11.5–14.5)
FLUAV RNA RESP QL NAA+PROBE: NOT DETECTED
FLUBV RNA RESP QL NAA+PROBE: NOT DETECTED
GLUCOSE SERPL-MCNC: 92 MG/DL (ref 74–99)
GLUCOSE UR STRIP.AUTO-MCNC: NORMAL MG/DL
HCT VFR BLD AUTO: 31.5 % (ref 36–46)
HGB BLD-MCNC: 9.8 G/DL (ref 12–16)
IMM GRANULOCYTES # BLD AUTO: 0.03 X10*3/UL (ref 0–0.5)
IMM GRANULOCYTES NFR BLD AUTO: 0.5 % (ref 0–0.9)
KETONES UR STRIP.AUTO-MCNC: NEGATIVE MG/DL
LEUKOCYTE ESTERASE UR QL STRIP.AUTO: NEGATIVE
LYMPHOCYTES # BLD AUTO: 0.45 X10*3/UL (ref 0.8–3)
LYMPHOCYTES NFR BLD AUTO: 7.2 %
MCH RBC QN AUTO: 29.5 PG (ref 26–34)
MCHC RBC AUTO-ENTMCNC: 31.1 G/DL (ref 32–36)
MCV RBC AUTO: 95 FL (ref 80–100)
MONOCYTES # BLD AUTO: 0.46 X10*3/UL (ref 0.05–0.8)
MONOCYTES NFR BLD AUTO: 7.3 %
NEUTROPHILS # BLD AUTO: 5.24 X10*3/UL (ref 1.6–5.5)
NEUTROPHILS NFR BLD AUTO: 83.4 %
NITRITE UR QL STRIP.AUTO: NEGATIVE
NRBC BLD-RTO: 0 /100 WBCS (ref 0–0)
P AXIS: 67 DEGREES
P OFFSET: 195 MS
P ONSET: 148 MS
PH UR STRIP.AUTO: 5 [PH]
PLATELET # BLD AUTO: 227 X10*3/UL (ref 150–450)
POTASSIUM SERPL-SCNC: 5.1 MMOL/L (ref 3.5–5.3)
PR INTERVAL: 156 MS
PROT SERPL-MCNC: 6.4 G/DL (ref 6.4–8.2)
PROT UR STRIP.AUTO-MCNC: NEGATIVE MG/DL
Q ONSET: 226 MS
QRS COUNT: 16 BEATS
QRS DURATION: 100 MS
QT INTERVAL: 344 MS
QTC CALCULATION(BAZETT): 436 MS
QTC FREDERICIA: 403 MS
R AXIS: 27 DEGREES
RBC # BLD AUTO: 3.32 X10*6/UL (ref 4–5.2)
RBC # UR STRIP.AUTO: NEGATIVE /UL
SARS-COV-2 RNA RESP QL NAA+PROBE: NOT DETECTED
SODIUM SERPL-SCNC: 134 MMOL/L (ref 136–145)
SP GR UR STRIP.AUTO: 1.01
T AXIS: 58 DEGREES
T OFFSET: 398 MS
UROBILINOGEN UR STRIP.AUTO-MCNC: NORMAL MG/DL
VENTRICULAR RATE: 97 BPM
WBC # BLD AUTO: 6.3 X10*3/UL (ref 4.4–11.3)

## 2024-10-25 PROCEDURE — 87636 SARSCOV2 & INF A&B AMP PRB: CPT | Performed by: EMERGENCY MEDICINE

## 2024-10-25 PROCEDURE — 83880 ASSAY OF NATRIURETIC PEPTIDE: CPT | Performed by: EMERGENCY MEDICINE

## 2024-10-25 PROCEDURE — 85025 COMPLETE CBC W/AUTO DIFF WBC: CPT | Performed by: EMERGENCY MEDICINE

## 2024-10-25 PROCEDURE — 2500000001 HC RX 250 WO HCPCS SELF ADMINISTERED DRUGS (ALT 637 FOR MEDICARE OP): Performed by: INTERNAL MEDICINE

## 2024-10-25 PROCEDURE — 84484 ASSAY OF TROPONIN QUANT: CPT | Performed by: EMERGENCY MEDICINE

## 2024-10-25 PROCEDURE — 71046 X-RAY EXAM CHEST 2 VIEWS: CPT | Performed by: RADIOLOGY

## 2024-10-25 PROCEDURE — 2500000004 HC RX 250 GENERAL PHARMACY W/ HCPCS (ALT 636 FOR OP/ED): Performed by: EMERGENCY MEDICINE

## 2024-10-25 PROCEDURE — 87449 NOS EACH ORGANISM AG IA: CPT | Mod: AHULAB | Performed by: EMERGENCY MEDICINE

## 2024-10-25 PROCEDURE — 2500000004 HC RX 250 GENERAL PHARMACY W/ HCPCS (ALT 636 FOR OP/ED): Performed by: INTERNAL MEDICINE

## 2024-10-25 PROCEDURE — 1100000001 HC PRIVATE ROOM DAILY

## 2024-10-25 PROCEDURE — 36415 COLL VENOUS BLD VENIPUNCTURE: CPT | Performed by: EMERGENCY MEDICINE

## 2024-10-25 PROCEDURE — 71046 X-RAY EXAM CHEST 2 VIEWS: CPT

## 2024-10-25 PROCEDURE — 80053 COMPREHEN METABOLIC PANEL: CPT | Performed by: EMERGENCY MEDICINE

## 2024-10-25 PROCEDURE — 99285 EMERGENCY DEPT VISIT HI MDM: CPT

## 2024-10-25 PROCEDURE — 81003 URINALYSIS AUTO W/O SCOPE: CPT | Performed by: EMERGENCY MEDICINE

## 2024-10-25 PROCEDURE — 87899 AGENT NOS ASSAY W/OPTIC: CPT | Mod: AHULAB | Performed by: EMERGENCY MEDICINE

## 2024-10-25 PROCEDURE — 71250 CT THORAX DX C-: CPT | Performed by: RADIOLOGY

## 2024-10-25 PROCEDURE — 93005 ELECTROCARDIOGRAM TRACING: CPT

## 2024-10-25 PROCEDURE — 71250 CT THORAX DX C-: CPT

## 2024-10-25 RX ORDER — CEFTRIAXONE 1 G/50ML
1 INJECTION, SOLUTION INTRAVENOUS ONCE
Status: COMPLETED | OUTPATIENT
Start: 2024-10-25 | End: 2024-10-25

## 2024-10-25 RX ORDER — MIRTAZAPINE 15 MG/1
7.5 TABLET, FILM COATED ORAL NIGHTLY
Status: DISCONTINUED | OUTPATIENT
Start: 2024-10-25 | End: 2024-10-31 | Stop reason: HOSPADM

## 2024-10-25 RX ORDER — POLYETHYLENE GLYCOL 3350 17 G/17G
17 POWDER, FOR SOLUTION ORAL DAILY
Status: DISCONTINUED | OUTPATIENT
Start: 2024-10-25 | End: 2024-10-31 | Stop reason: HOSPADM

## 2024-10-25 RX ORDER — AMOXICILLIN 500 MG/1
500 CAPSULE ORAL 3 TIMES DAILY
COMMUNITY
End: 2024-10-31 | Stop reason: HOSPADM

## 2024-10-25 RX ORDER — FAMOTIDINE 20 MG/1
20 TABLET, FILM COATED ORAL NIGHTLY
Status: DISCONTINUED | OUTPATIENT
Start: 2024-10-25 | End: 2024-10-31 | Stop reason: HOSPADM

## 2024-10-25 RX ORDER — FUROSEMIDE 20 MG/1
20 TABLET ORAL DAILY
Status: DISCONTINUED | OUTPATIENT
Start: 2024-10-26 | End: 2024-10-31 | Stop reason: HOSPADM

## 2024-10-25 RX ORDER — CETIRIZINE HYDROCHLORIDE 10 MG/1
10 TABLET ORAL DAILY
Status: DISCONTINUED | OUTPATIENT
Start: 2024-10-26 | End: 2024-10-31 | Stop reason: HOSPADM

## 2024-10-25 RX ORDER — MIRTAZAPINE 7.5 MG/1
1 TABLET, FILM COATED ORAL NIGHTLY
COMMUNITY

## 2024-10-25 RX ORDER — CHOLECALCIFEROL (VITAMIN D3) 25 MCG
2000 TABLET ORAL DAILY
Status: DISCONTINUED | OUTPATIENT
Start: 2024-10-26 | End: 2024-10-31 | Stop reason: HOSPADM

## 2024-10-25 RX ORDER — SPIRONOLACTONE 25 MG/1
25 TABLET ORAL DAILY
Status: DISCONTINUED | OUTPATIENT
Start: 2024-10-26 | End: 2024-10-31 | Stop reason: HOSPADM

## 2024-10-25 RX ORDER — HEPARIN SODIUM 5000 [USP'U]/ML
5000 INJECTION, SOLUTION INTRAVENOUS; SUBCUTANEOUS EVERY 12 HOURS
Status: DISCONTINUED | OUTPATIENT
Start: 2024-10-25 | End: 2024-10-31 | Stop reason: HOSPADM

## 2024-10-25 RX ORDER — CETIRIZINE HYDROCHLORIDE 10 MG/1
10 TABLET ORAL DAILY
COMMUNITY

## 2024-10-25 RX ORDER — ATORVASTATIN CALCIUM 40 MG/1
40 TABLET, FILM COATED ORAL NIGHTLY
Status: DISCONTINUED | OUTPATIENT
Start: 2024-10-25 | End: 2024-10-31 | Stop reason: HOSPADM

## 2024-10-25 RX ORDER — DULOXETIN HYDROCHLORIDE 30 MG/1
30 CAPSULE, DELAYED RELEASE ORAL DAILY
Status: DISCONTINUED | OUTPATIENT
Start: 2024-10-25 | End: 2024-10-31 | Stop reason: HOSPADM

## 2024-10-25 RX ADMIN — DULOXETINE HYDROCHLORIDE 30 MG: 30 CAPSULE, DELAYED RELEASE ORAL at 21:36

## 2024-10-25 RX ADMIN — AZITHROMYCIN MONOHYDRATE 500 MG: 500 INJECTION, POWDER, LYOPHILIZED, FOR SOLUTION INTRAVENOUS at 21:37

## 2024-10-25 RX ADMIN — HEPARIN SODIUM 5000 UNITS: 5000 INJECTION INTRAVENOUS; SUBCUTANEOUS at 21:37

## 2024-10-25 RX ADMIN — CEFTRIAXONE SODIUM 1 G: 1 INJECTION, SOLUTION INTRAVENOUS at 17:58

## 2024-10-25 RX ADMIN — MIRTAZAPINE 7.5 MG: 15 TABLET, FILM COATED ORAL at 21:36

## 2024-10-25 RX ADMIN — ATORVASTATIN CALCIUM 40 MG: 40 TABLET, FILM COATED ORAL at 21:36

## 2024-10-25 RX ADMIN — FAMOTIDINE 20 MG: 20 TABLET, FILM COATED ORAL at 21:36

## 2024-10-25 SDOH — ECONOMIC STABILITY: FOOD INSECURITY: WITHIN THE PAST 12 MONTHS, THE FOOD YOU BOUGHT JUST DIDN'T LAST AND YOU DIDN'T HAVE MONEY TO GET MORE.: NEVER TRUE

## 2024-10-25 SDOH — SOCIAL STABILITY: SOCIAL INSECURITY
WITHIN THE LAST YEAR, HAVE YOU BEEN RAPED OR FORCED TO HAVE ANY KIND OF SEXUAL ACTIVITY BY YOUR PARTNER OR EX-PARTNER?: NO

## 2024-10-25 SDOH — SOCIAL STABILITY: SOCIAL INSECURITY: WITHIN THE LAST YEAR, HAVE YOU BEEN AFRAID OF YOUR PARTNER OR EX-PARTNER?: NO

## 2024-10-25 SDOH — ECONOMIC STABILITY: FOOD INSECURITY: WITHIN THE PAST 12 MONTHS, YOU WORRIED THAT YOUR FOOD WOULD RUN OUT BEFORE YOU GOT THE MONEY TO BUY MORE.: NEVER TRUE

## 2024-10-25 SDOH — SOCIAL STABILITY: SOCIAL INSECURITY
WITHIN THE LAST YEAR, HAVE YOU BEEN KICKED, HIT, SLAPPED, OR OTHERWISE PHYSICALLY HURT BY YOUR PARTNER OR EX-PARTNER?: NO

## 2024-10-25 SDOH — SOCIAL STABILITY: SOCIAL INSECURITY: ABUSE: ADULT

## 2024-10-25 SDOH — SOCIAL STABILITY: SOCIAL INSECURITY: HAS ANYONE EVER THREATENED TO HURT YOUR FAMILY OR YOUR PETS?: NO

## 2024-10-25 SDOH — HEALTH STABILITY: MENTAL HEALTH: HOW OFTEN DO YOU HAVE SIX OR MORE DRINKS ON ONE OCCASION?: NEVER

## 2024-10-25 SDOH — HEALTH STABILITY: MENTAL HEALTH: HOW OFTEN DO YOU HAVE A DRINK CONTAINING ALCOHOL?: NEVER

## 2024-10-25 SDOH — SOCIAL STABILITY: SOCIAL INSECURITY: ARE YOU OR HAVE YOU BEEN THREATENED OR ABUSED PHYSICALLY, EMOTIONALLY, OR SEXUALLY BY ANYONE?: NO

## 2024-10-25 SDOH — ECONOMIC STABILITY: INCOME INSECURITY: IN THE PAST 12 MONTHS HAS THE ELECTRIC, GAS, OIL, OR WATER COMPANY THREATENED TO SHUT OFF SERVICES IN YOUR HOME?: NO

## 2024-10-25 SDOH — SOCIAL STABILITY: SOCIAL INSECURITY: WITHIN THE LAST YEAR, HAVE YOU BEEN HUMILIATED OR EMOTIONALLY ABUSED IN OTHER WAYS BY YOUR PARTNER OR EX-PARTNER?: NO

## 2024-10-25 SDOH — HEALTH STABILITY: MENTAL HEALTH: HOW MANY DRINKS CONTAINING ALCOHOL DO YOU HAVE ON A TYPICAL DAY WHEN YOU ARE DRINKING?: PATIENT DOES NOT DRINK

## 2024-10-25 SDOH — SOCIAL STABILITY: SOCIAL INSECURITY: HAVE YOU HAD ANY THOUGHTS OF HARMING ANYONE ELSE?: NO

## 2024-10-25 SDOH — SOCIAL STABILITY: SOCIAL INSECURITY: ARE THERE ANY APPARENT SIGNS OF INJURIES/BEHAVIORS THAT COULD BE RELATED TO ABUSE/NEGLECT?: NO

## 2024-10-25 SDOH — SOCIAL STABILITY: SOCIAL INSECURITY: DO YOU FEEL ANYONE HAS EXPLOITED OR TAKEN ADVANTAGE OF YOU FINANCIALLY OR OF YOUR PERSONAL PROPERTY?: NO

## 2024-10-25 SDOH — ECONOMIC STABILITY: HOUSING INSECURITY: IN THE LAST 12 MONTHS, WAS THERE A TIME WHEN YOU WERE NOT ABLE TO PAY THE MORTGAGE OR RENT ON TIME?: NO

## 2024-10-25 SDOH — ECONOMIC STABILITY: TRANSPORTATION INSECURITY: IN THE PAST 12 MONTHS, HAS LACK OF TRANSPORTATION KEPT YOU FROM MEDICAL APPOINTMENTS OR FROM GETTING MEDICATIONS?: NO

## 2024-10-25 SDOH — ECONOMIC STABILITY: FOOD INSECURITY: HOW HARD IS IT FOR YOU TO PAY FOR THE VERY BASICS LIKE FOOD, HOUSING, MEDICAL CARE, AND HEATING?: NOT HARD AT ALL

## 2024-10-25 SDOH — ECONOMIC STABILITY: HOUSING INSECURITY: AT ANY TIME IN THE PAST 12 MONTHS, WERE YOU HOMELESS OR LIVING IN A SHELTER (INCLUDING NOW)?: NO

## 2024-10-25 SDOH — SOCIAL STABILITY: SOCIAL INSECURITY: DOES ANYONE TRY TO KEEP YOU FROM HAVING/CONTACTING OTHER FRIENDS OR DOING THINGS OUTSIDE YOUR HOME?: NO

## 2024-10-25 SDOH — SOCIAL STABILITY: SOCIAL INSECURITY: DO YOU FEEL UNSAFE GOING BACK TO THE PLACE WHERE YOU ARE LIVING?: NO

## 2024-10-25 SDOH — SOCIAL STABILITY: SOCIAL INSECURITY: WERE YOU ABLE TO COMPLETE ALL THE BEHAVIORAL HEALTH SCREENINGS?: YES

## 2024-10-25 SDOH — ECONOMIC STABILITY: HOUSING INSECURITY: IN THE PAST 12 MONTHS, HOW MANY TIMES HAVE YOU MOVED WHERE YOU WERE LIVING?: 0

## 2024-10-25 SDOH — SOCIAL STABILITY: SOCIAL INSECURITY: HAVE YOU HAD THOUGHTS OF HARMING ANYONE ELSE?: NO

## 2024-10-25 ASSESSMENT — ENCOUNTER SYMPTOMS
PALPITATIONS: 0
CHOKING: 0
SHORTNESS OF BREATH: 1
GASTROINTESTINAL NEGATIVE: 1
FEVER: 0
ACTIVITY CHANGE: 1
CHILLS: 0

## 2024-10-25 ASSESSMENT — PAIN - FUNCTIONAL ASSESSMENT: PAIN_FUNCTIONAL_ASSESSMENT: 0-10

## 2024-10-25 ASSESSMENT — COGNITIVE AND FUNCTIONAL STATUS - GENERAL
PATIENT BASELINE BEDBOUND: NO
WALKING IN HOSPITAL ROOM: A LITTLE
TOILETING: A LITTLE
MOBILITY SCORE: 21
DRESSING REGULAR UPPER BODY CLOTHING: A LITTLE
CLIMB 3 TO 5 STEPS WITH RAILING: A LITTLE
HELP NEEDED FOR BATHING: A LITTLE
DAILY ACTIVITIY SCORE: 21
STANDING UP FROM CHAIR USING ARMS: A LITTLE

## 2024-10-25 ASSESSMENT — PATIENT HEALTH QUESTIONNAIRE - PHQ9
2. FEELING DOWN, DEPRESSED OR HOPELESS: NOT AT ALL
1. LITTLE INTEREST OR PLEASURE IN DOING THINGS: NOT AT ALL
SUM OF ALL RESPONSES TO PHQ9 QUESTIONS 1 & 2: 0

## 2024-10-25 ASSESSMENT — PAIN SCALES - GENERAL: PAINLEVEL_OUTOF10: 0 - NO PAIN

## 2024-10-25 ASSESSMENT — LIFESTYLE VARIABLES
AUDIT-C TOTAL SCORE: 0
HOW OFTEN DO YOU HAVE A DRINK CONTAINING ALCOHOL: NEVER
AUDIT-C TOTAL SCORE: 0
SKIP TO QUESTIONS 9-10: 1
SKIP TO QUESTIONS 9-10: 1
HOW OFTEN DO YOU HAVE 6 OR MORE DRINKS ON ONE OCCASION: NEVER
AUDIT-C TOTAL SCORE: 0
HOW MANY STANDARD DRINKS CONTAINING ALCOHOL DO YOU HAVE ON A TYPICAL DAY: PATIENT DOES NOT DRINK

## 2024-10-25 ASSESSMENT — ACTIVITIES OF DAILY LIVING (ADL)
ASSISTIVE_DEVICE: EYEGLASSES
HEARING - LEFT EAR: FUNCTIONAL
ADEQUATE_TO_COMPLETE_ADL: YES
JUDGMENT_ADEQUATE_SAFELY_COMPLETE_DAILY_ACTIVITIES: YES
BATHING: INDEPENDENT
LACK_OF_TRANSPORTATION: NO
WALKS IN HOME: INDEPENDENT
DRESSING YOURSELF: INDEPENDENT
LACK_OF_TRANSPORTATION: NO
TOILETING: INDEPENDENT
FEEDING YOURSELF: INDEPENDENT
GROOMING: INDEPENDENT
HEARING - RIGHT EAR: FUNCTIONAL
PATIENT'S MEMORY ADEQUATE TO SAFELY COMPLETE DAILY ACTIVITIES?: YES

## 2024-10-25 ASSESSMENT — COLUMBIA-SUICIDE SEVERITY RATING SCALE - C-SSRS
1. IN THE PAST MONTH, HAVE YOU WISHED YOU WERE DEAD OR WISHED YOU COULD GO TO SLEEP AND NOT WAKE UP?: NO
2. HAVE YOU ACTUALLY HAD ANY THOUGHTS OF KILLING YOURSELF?: NO
6. HAVE YOU EVER DONE ANYTHING, STARTED TO DO ANYTHING, OR PREPARED TO DO ANYTHING TO END YOUR LIFE?: NO

## 2024-10-25 NOTE — ED PROVIDER NOTES
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  Patient notes that she has been experiencing exertional dyspnea for some time but over the last 2 weeks it progressively worsened to the point where she is having hard time walking across her room at home.  No symptoms improved with rest.  Did note right-sided chest pain over the last 1-2 weeks, intermittent, dull in nature, denies any over the last 1-2 days.  Denies any lower extremity edema or pain recently, does note a history of lower extremity edema but states this was managed by her nephrologist (attributed to CKD at the time) and resolved.  Denies any fevers, chills.  Denies any history of PE or DVT, denies any history of CHF, CAD/AMI.  Was evaluated at urgent care for worsening symptoms today and referred to the emergency department due to diminished right-sided lung sounds.  Patient denies any sick contacts, any recent travel.  Denies other recent illness or injury.              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    Change in bowel habit 09/13/2016    Increased bowel frequency    Chronic kidney disease     Chronic kidney disease, stage 3 unspecified (Multi) 06/12/2022    Chronic kidney disease (CKD), stage III (moderate)    Clotting disorder (Multi)     Diarrhea, unspecified 10/09/2017    Diarrhea, unspecified  type    Diverticulitis of large intestine without perforation or abscess without bleeding 10/11/2017    Diverticulitis of colon    Diverticulosis of large intestine without perforation or abscess without bleeding 10/09/2017    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 bleeding    Dysphonia 04/28/2016    Dysphonia    Eczema     Essential (primary) hypertension 01/26/2019    Benign essential hypertension    Gastro-esophageal reflux disease without esophagitis 02/02/2020    GERD (gastroesophageal reflux disease)    Heart murmur     Nonrheumatic aortic (valve) insufficiency 12/07/2015    Nonrheumatic aortic valve insufficiency    Other conditions influencing health status     Osteoarthritis    Other fecal abnormalities 07/28/2015    Loose stools    Other specified noninflammatory disorders of vagina 07/17/2020    Vaginal irritation    Other specified noninflammatory disorders of vagina 05/25/2022    Vaginal irritation    Other specified symptoms and signs involving the circulatory and respiratory systems 04/28/2016    Abnormal lung sounds    Personal history of diseases of the blood and blood-forming organs and certain disorders involving the immune mechanism     History of anemia    Personal history of other diseases of the circulatory system     History of hypertension    Personal history of other diseases of the circulatory system     History of mitral valve prolapse    Personal history of other diseases of the digestive system     History of esophageal reflux    Personal history of other diseases of the respiratory system 12/19/2013    Personal history of asthma    Personal history of other endocrine, nutritional and metabolic disease 12/19/2013    History of hyperlipidemia    Primary osteoarthritis, unspecified shoulder 12/08/2014    Shoulder arthritis    Radiculopathy, lumbosacral region 05/07/2019    Lumbosacral radiculopathy at L4    Unilateral primary osteoarthritis, left knee 06/05/2017    Arthritis of  left knee    Urinary tract infection     Varicella      Past Surgical History:   Procedure Laterality Date    ADENOIDECTOMY      CATARACT EXTRACTION  2015    Cataract Surgery    COLONOSCOPY  2015    Complete Colonoscopy    OTHER SURGICAL HISTORY  2015    Direct Laryngoscopy With Stripping Of Vocal Cords    OTHER SURGICAL HISTORY  2015    Vocal Cordectomy    SHOULDER SURGERY  2013    Shoulder Surgery    TONSILLECTOMY  2015    Tonsillectomy     No family history on file.  Social History     Tobacco Use    Smoking status: Former     Current packs/day: 0.00     Types: Cigarettes     Quit date: 1988     Years since quittin.4    Smokeless tobacco: Never   Substance Use Topics    Alcohol use: Not Currently    Drug use: Never       Physical Exam   ED Triage Vitals [10/25/24 1238]   Temperature Heart Rate Respirations BP   36.1 °C (97 °F) 99 20 (!) 144/103      SpO2 Temp Source Heart Rate Source Patient Position   -- Temporal -- --      BP Location FiO2 (%)     -- --       Physical Exam  Vitals and nursing note reviewed.   Constitutional:       General: She is not in acute distress.     Appearance: Normal appearance. She is not ill-appearing or toxic-appearing.      Comments: Speaking in full clear sentences, no evidence of respiratory distress.   HENT:      Head: Normocephalic and atraumatic.      Comments: No stridor.  Clear oropharynx.     Nose: No congestion or rhinorrhea.      Mouth/Throat:      Mouth: Mucous membranes are moist.      Pharynx: Oropharynx is clear. No oropharyngeal exudate or posterior oropharyngeal erythema.   Eyes:      Extraocular Movements: Extraocular movements intact.      Right eye: Normal extraocular motion.      Left eye: Normal extraocular motion.      Conjunctiva/sclera: Conjunctivae normal.      Pupils: Pupils are equal, round, and reactive to light.   Neck:      Vascular: No hepatojugular reflux or JVD.   Cardiovascular:      Rate and Rhythm:  Regular rhythm. Tachycardia present.      Pulses: Normal pulses.           Radial pulses are 2+ on the right side and 2+ on the left side.      Heart sounds: Normal heart sounds, S1 normal and S2 normal. No murmur heard.     No friction rub. No gallop.   Pulmonary:      Effort: Pulmonary effort is normal. No tachypnea, accessory muscle usage, prolonged expiration or respiratory distress.      Breath sounds: No stridor. Examination of the right-middle field reveals decreased breath sounds. Examination of the right-lower field reveals decreased breath sounds. Decreased breath sounds present. No wheezing, rhonchi or rales.   Abdominal:      General: Abdomen is flat. Bowel sounds are normal. There is no distension.      Palpations: Abdomen is soft.      Tenderness: There is no abdominal tenderness. There is no right CVA tenderness, left CVA tenderness, guarding or rebound.   Musculoskeletal:      Cervical back: Full passive range of motion without pain.      Right lower leg: No edema.      Left lower leg: No edema.   Skin:     General: Skin is warm and dry.   Neurological:      General: No focal deficit present.      Mental Status: She is alert and oriented to person, place, and time.      GCS: GCS eye subscore is 4. GCS verbal subscore is 5. GCS motor subscore is 6.      Cranial Nerves: No cranial nerve deficit.      Sensory: No sensory deficit.      Motor: No weakness, tremor or abnormal muscle tone.   Psychiatric:         Mood and Affect: Mood normal.           ED Course & MDM   Diagnoses as of 10/26/24 1137   Pleural effusion                 No data recorded     [place] Coma Scale Score: 15 (10/25/24 1235 : Kathy Chiu RN)                           Medical Decision Making  Patient presenting with exertional dyspnea over the last few months worsening over the last 1-2 weeks.  Does have some diminished lung sounds on the right side with a moderate-sized pleural effusion noted on CT scan in September, concern  for worsening effusion versus developing pneumothorax, pneumonia.  Some concern for pulmonary embolism although patient not hypoxemic, has no evidence of DVT on exam and has no pleuritic type chest pain or hemoptysis.  Although patient is afebrile and overall well-appearing, will assess for precipitating infectious etiology with viral testing, chest x-ray.  Will also obtain CT scan of the chest.  Will obtain BMP, ECG, troponin to assess for atypical cardiac etiology (although patient without any chest pain currently).  Disposition pending labs, imaging results, low threshold for admission given exertional dyspnea precluding safe ambulation at home.    Patient's x-ray showing enlarged right lower pleural effusion, CT scan subsequently shows large pleural effusion with associated consolidation.  Labs largely unrevealing.  CT imaging also shows new lesions in the liver, spleen.  Overall concern for malignant effusion, given significant dyspnea, plan for admission for IR drainage/further evaluation.  Given associated consolidation in the right lower lobe discharge patient on antibiotics for CAP coverage and obtained urine antigen testing as well.  Patient admitted in stable condition.        Procedure  Procedures     Minh Verdin MD  10/26/24 5317

## 2024-10-25 NOTE — PATIENT INSTRUCTIONS
Clinically 89-year-old with shortness of breath, right-sided diminished sounds, right lung dullness, CT scan reviewed from September 24 suggestive of right pleural effusion.  Patient and son advised for ER evaluation due to symptoms and pulse ox 88-92%on RA.  Report called to the ER

## 2024-10-25 NOTE — CARE PLAN
The patient's goals for the shift include      The clinical goals for the shift include hds, safety

## 2024-10-25 NOTE — PROGRESS NOTES
Subjective   Patient ID: Kajal Null is a 89 y.o. female. They present today with a chief complaint of Shortness of Breath (Pt c/o difficulty breathing/SOB progressively getting worse x 3-4 days. DORIAN, RN).    History of Present Illness  Kajal Null is a 89 y.o. female with lymphocytic colitis, laryngeal cancer s/p radiation, left lung cancer s/p radiation 1 year ago.  Her son is at bedside.  She is referred here by her primary new onset of shortness of breath with exertion, ADL, no new cough or wheezing.  Scheduled for bronchoscopy in 10 days at [facility].       History provided by:  Patient   used: No    Shortness of Breath  Severity:  Severe  Onset quality:  Sudden  Duration:  3 days  Timing:  Intermittent  Progression:  Worsening  Chronicity:  New  Context: activity    Context: not URI    Associated symptoms: chest pain    Associated symptoms: no fever        Past Medical History  Allergies as of 10/25/2024 - Reviewed 10/25/2024   Allergen Reaction Noted    Alendronate Other 03/14/2024    Amoxicillin-pot clavulanate Diarrhea 05/23/2024    Codeine GI intolerance, GI Upset, and Hives 06/30/2020    Dexlansoprazole Nausea/vomiting and Nausea Only 03/14/2024    Diclofenac Diarrhea 05/23/2024    House dust Unknown 04/07/2004    Mold Unknown 04/07/2004    Nitrofurantoin GI Upset 03/14/2024    Trimethoprim Diarrhea 03/14/2024    Sulfa (sulfonamide antibiotics) Rash 03/14/2024       (Not in a hospital admission)       Past Medical History:   Diagnosis Date    Age-related osteoporosis without current pathological fracture 04/07/2022    Osteoporosis, postmenopausal    Allergic     Anemia     Anxiety     Arthritis     Asthma     Benign paroxysmal vertigo, unspecified ear     BPV (benign positional vertigo)    Cancer (Multi)     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    Change in bowel habit 09/13/2016    Increased bowel frequency    Chronic kidney disease      Chronic kidney disease, stage 3 unspecified (Multi) 06/12/2022    Chronic kidney disease (CKD), stage III (moderate)    Clotting disorder (Multi)     Diarrhea, unspecified 10/09/2017    Diarrhea, unspecified type    Diverticulitis of large intestine without perforation or abscess without bleeding 10/11/2017    Diverticulitis of colon    Diverticulosis of large intestine without perforation or abscess without bleeding 10/09/2017    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 bleeding    Dysphonia 04/28/2016    Dysphonia    Eczema     Essential (primary) hypertension 01/26/2019    Benign essential hypertension    Gastro-esophageal reflux disease without esophagitis 02/02/2020    GERD (gastroesophageal reflux disease)    Heart murmur     Nonrheumatic aortic (valve) insufficiency 12/07/2015    Nonrheumatic aortic valve insufficiency    Other conditions influencing health status     Osteoarthritis    Other fecal abnormalities 07/28/2015    Loose stools    Other specified noninflammatory disorders of vagina 07/17/2020    Vaginal irritation    Other specified noninflammatory disorders of vagina 05/25/2022    Vaginal irritation    Other specified symptoms and signs involving the circulatory and respiratory systems 04/28/2016    Abnormal lung sounds    Personal history of diseases of the blood and blood-forming organs and certain disorders involving the immune mechanism     History of anemia    Personal history of other diseases of the circulatory system     History of hypertension    Personal history of other diseases of the circulatory system     History of mitral valve prolapse    Personal history of other diseases of the digestive system     History of esophageal reflux    Personal history of other diseases of the respiratory system 12/19/2013    Personal history of asthma    Personal history of other endocrine, nutritional and metabolic disease 12/19/2013    History of hyperlipidemia    Primary osteoarthritis,  unspecified shoulder 12/08/2014    Shoulder arthritis    Radiculopathy, lumbosacral region 05/07/2019    Lumbosacral radiculopathy at L4    Unilateral primary osteoarthritis, left knee 06/05/2017    Arthritis of left knee    Urinary tract infection     Varicella        Past Surgical History:   Procedure Laterality Date    ADENOIDECTOMY      CATARACT EXTRACTION  09/04/2015    Cataract Surgery    COLONOSCOPY  09/04/2015    Complete Colonoscopy    OTHER SURGICAL HISTORY  09/04/2015    Direct Laryngoscopy With Stripping Of Vocal Cords    OTHER SURGICAL HISTORY  09/04/2015    Vocal Cordectomy    SHOULDER SURGERY  09/24/2013    Shoulder Surgery    TONSILLECTOMY  09/04/2015    Tonsillectomy        reports that she quit smoking about 36 years ago. Her smoking use included cigarettes. She has never used smokeless tobacco. She reports that she does not currently use alcohol. She reports that she does not use drugs.    Review of Systems  Review of Systems   Constitutional:  Positive for activity change. Negative for chills and fever.   HENT: Negative.     Respiratory:  Positive for shortness of breath. Negative for choking.    Cardiovascular:  Positive for chest pain. Negative for palpitations and leg swelling.   Gastrointestinal: Negative.                                   Objective    There were no vitals filed for this visit.  No LMP recorded. Patient is postmenopausal.    Physical Exam  Vitals and nursing note reviewed.   Constitutional:       General: She is in acute distress.      Appearance: She is not ill-appearing, toxic-appearing or diaphoretic.   HENT:      Mouth/Throat:      Mouth: Mucous membranes are dry.   Cardiovascular:      Rate and Rhythm: Normal rate and regular rhythm.      Heart sounds: No murmur heard.     No friction rub.   Pulmonary:      Effort: Respiratory distress present.      Comments: Right side diminished breath sounds, dullness right entire lobe  Neurological:      General: No focal deficit  present.      Mental Status: She is alert and oriented to person, place, and time.   Psychiatric:         Mood and Affect: Mood normal.         Procedures    Point of Care Test & Imaging Results from this visit  No results found for this visit on 10/25/24.   No results found.    Diagnostic study results (if any) were reviewed by Giselle Mccrary.    Assessment/Plan   Allergies, medications, history, and pertinent labs/EKGs/Imaging reviewed by Giselle Mccrary.     Medical Decision Making  Clinically 89-year-old with shortness of breath, right-sided diminished sounds, right lung dullness, CT scan reviewed from September 24 suggestive of right pleural effusion.  Patient and son advised for ER evaluation due to symptoms and pulse ox 88-92%on RA.  Report called to the ER    Orders and Diagnoses  Diagnoses and all orders for this visit:  SOB (shortness of breath)  Pleural effusion      Medical Admin Record      Patient disposition: ED    Electronically signed by Giselle Mccrary  12:15 PM

## 2024-10-25 NOTE — PROGRESS NOTES
Pharmacy Medication History Review    The following updates were made to the Prior to Admission medication list:     Source of Information:     Medications ADDED:   Remeron  Amoxicillin ( one day left )   zyrtec     Medications CHANGED:  Lasix is 20mg daily  Tylenol is scheduled   Medications REMOVED:   Sominex  Ferrous sulfate     Medications NOT TAKING:   Diflucan is completed    Allergy reviewed : Yes    Meds 2 Beds : Yes    Comments: per patient, son and pharmacy      Kajal Null is a 89 y.o. female admitted for Pleural effusion. Pharmacy reviewed the patient's [id number] medications and allergies for accuracy.    The list below reflectives the updated PTA list. Please review each medication in order reconciliation for additional clarification and justification.       The list below reflectives the updated allergy list. Please review each documented allergy for additional clarification and justification.  Allergies  Reviewed by Kathy Chiu RN on 10/25/2024        Severity Reactions Comments    Alendronate Not Specified Other     Amoxicillin-pot Clavulanate Not Specified Diarrhea     Codeine Not Specified GI intolerance, GI Upset, Hives     Dexlansoprazole Not Specified Nausea/vomiting, Nausea Only     Diclofenac Not Specified Diarrhea     House Dust Not Specified Unknown     Mold Not Specified Unknown     Nitrofurantoin Not Specified GI Upset     Trimethoprim Not Specified Diarrhea     Sulfa (sulfonamide Antibiotics) Low Rash             Below are additional concerns with the patient's PTA list.  Prior to Admission Medications   Prescriptions Last Dose Informant   DULoxetine (Cymbalta) 30 mg DR capsule     Sig: Take 1 capsule (30 mg) by mouth once daily. Do not crush or chew.   acetaminophen (Tylenol 8 HOUR) 650 mg ER tablet 10/25/2024 Morning    Sig: Take 1 tablet (650 mg) by mouth every 8 hours. Do not crush, chew, or split.   albuterol 90 mcg/actuation inhaler     Sig: Inhale 2 puffs every 4  hours if needed for shortness of breath.   amoxicillin (Amoxil) 500 mg capsule 10/25/2024 Morning    Sig: Take 1 capsule (500 mg) by mouth 3 times a day.   atorvastatin (Lipitor) 40 mg tablet 10/24/2024 Evening    Sig: TAKE 1 TABLET BY MOUTH EVERY DAY AS DIRECTED   cephalexin (Keflex) 250 mg capsule 10/24/2024 Noon    Sig: Take 1 capsule (250 mg) by mouth once daily.   cetirizine (ZyrTEC) 10 mg tablet 10/25/2024 Morning    Sig: Take 1 tablet (10 mg) by mouth once daily.   cholecalciferol (Vitamin D-3) 50 mcg (2,000 unit) capsule 10/25/2024 Morning    Sig: Take 1 capsule (50 mcg) by mouth early in the morning..   clobetasol (Temovate) 0.05 % cream     Sig: Apply 1 Application topically 2 times a day as needed (irritation).   cranberry fruit 465 mg capsule 10/25/2024 Morning    Sig: Take 465 mg by mouth early in the morning..   estradiol (Estrace) 0.01 % (0.1 mg/gram) vaginal cream Past Week    Sig: Place a pea sized amount vaginally 3 times a week   Patient taking differently: Insert 0.25 Applicatorfuls (1 g) into the vagina 3 (three) times a week. Place a pea sized amount vaginally 3 times a week   famotidine (Pepcid) 20 mg tablet 10/24/2024 Evening    Sig: Take 1 tablet (20 mg) by mouth once daily at bedtime.   fluconazole (Diflucan) 150 mg tablet     Sig: Take one tablet now, wait three days and then take second tablet for vaginal discomfort.   furosemide (Lasix) 20 mg tablet 10/25/2024 Morning    Sig: Take 1 tablet (20 mg) by mouth once daily.   inhalational spacing device inhaler     Sig: Use as directed with inhalers   mirabegron (Myrbetriq) 50 mg tablet extended release 24 hr 24 hr tablet 10/24/2024 Evening    Sig: Take 1 tablet (50 mg) by mouth once daily.   mirtazapine (Remeron) 7.5 mg tablet 10/24/2024 Bedtime    Sig: Take 1 tablet (7.5 mg) by mouth once daily at bedtime.   spironolactone (Aldactone) 25 mg tablet 10/24/2024 Noon    Sig: Take 1 tablet (25 mg) by mouth once daily.      Facility-Administered  Medications: None        Joanna Subramanian

## 2024-10-25 NOTE — ED TRIAGE NOTES
Pt coming in from urgent care where they believe that she has a pleural effusion on the right side. Has been having increase shortness of breath and worse on movement. She is also complaining of pain on the right side.  Pt is also has a hx of lung cancer

## 2024-10-26 LAB — HOLD SPECIMEN: NORMAL

## 2024-10-26 PROCEDURE — 1100000001 HC PRIVATE ROOM DAILY

## 2024-10-26 PROCEDURE — 99223 1ST HOSP IP/OBS HIGH 75: CPT | Performed by: INTERNAL MEDICINE

## 2024-10-26 PROCEDURE — 2500000002 HC RX 250 W HCPCS SELF ADMINISTERED DRUGS (ALT 637 FOR MEDICARE OP, ALT 636 FOR OP/ED): Performed by: INTERNAL MEDICINE

## 2024-10-26 PROCEDURE — 2500000001 HC RX 250 WO HCPCS SELF ADMINISTERED DRUGS (ALT 637 FOR MEDICARE OP): Performed by: INTERNAL MEDICINE

## 2024-10-26 PROCEDURE — 2500000004 HC RX 250 GENERAL PHARMACY W/ HCPCS (ALT 636 FOR OP/ED): Performed by: INTERNAL MEDICINE

## 2024-10-26 RX ADMIN — CETIRIZINE HYDROCHLORIDE 10 MG: 10 TABLET, FILM COATED ORAL at 09:13

## 2024-10-26 RX ADMIN — MIRTAZAPINE 7.5 MG: 15 TABLET, FILM COATED ORAL at 21:55

## 2024-10-26 RX ADMIN — FUROSEMIDE 20 MG: 20 TABLET ORAL at 09:13

## 2024-10-26 RX ADMIN — FAMOTIDINE 20 MG: 20 TABLET, FILM COATED ORAL at 21:55

## 2024-10-26 RX ADMIN — HEPARIN SODIUM 5000 UNITS: 5000 INJECTION INTRAVENOUS; SUBCUTANEOUS at 09:23

## 2024-10-26 RX ADMIN — SPIRONOLACTONE 25 MG: 25 TABLET ORAL at 09:13

## 2024-10-26 RX ADMIN — Medication 2000 UNITS: at 06:45

## 2024-10-26 RX ADMIN — HEPARIN SODIUM 5000 UNITS: 5000 INJECTION INTRAVENOUS; SUBCUTANEOUS at 18:05

## 2024-10-26 RX ADMIN — DULOXETINE HYDROCHLORIDE 30 MG: 30 CAPSULE, DELAYED RELEASE ORAL at 21:55

## 2024-10-26 RX ADMIN — ATORVASTATIN CALCIUM 40 MG: 40 TABLET, FILM COATED ORAL at 21:55

## 2024-10-26 ASSESSMENT — ENCOUNTER SYMPTOMS
NERVOUS/ANXIOUS: 0
APPETITE CHANGE: 1
UNEXPECTED WEIGHT CHANGE: 1
FATIGUE: 1
VOMITING: 0
EYE REDNESS: 0
EYE ITCHING: 0
LIGHT-HEADEDNESS: 1
NECK PAIN: 0
MYALGIAS: 1
HEADACHES: 0
DIARRHEA: 1
DYSURIA: 0
CONFUSION: 0
EYE PAIN: 0
FREQUENCY: 0
SINUS PRESSURE: 0
ARTHRALGIAS: 1
COUGH: 1
SEIZURES: 0
HEMATURIA: 0
CHEST TIGHTNESS: 1
BACK PAIN: 1
CONSTIPATION: 0
DIZZINESS: 1
FEVER: 0
SINUS PAIN: 0
SHORTNESS OF BREATH: 1
WHEEZING: 1
DYSPHORIC MOOD: 0
WOUND: 0
ABDOMINAL PAIN: 0
RHINORRHEA: 1
NAUSEA: 0
PALPITATIONS: 0
CHILLS: 1
SORE THROAT: 0

## 2024-10-26 ASSESSMENT — COGNITIVE AND FUNCTIONAL STATUS - GENERAL
STANDING UP FROM CHAIR USING ARMS: A LITTLE
MOVING TO AND FROM BED TO CHAIR: A LITTLE

## 2024-10-26 ASSESSMENT — PAIN SCALES - GENERAL: PAINLEVEL_OUTOF10: 0 - NO PAIN

## 2024-10-26 NOTE — CONSULTS
Inpatient consult to Pulmonology  Consult performed by: Gene Bailey MD  Consult ordered by: Alana Kwan MD      Reason For Consult  Pleural effusion.   History Of Present Illness  Kajal Null is a 89 y.o. female with h/o asthma, allergies, lung cancer s/p radiation, throat cancer, CKD III, anemia, GERD, who p/w worsening SOB x 3 days. Associated with weight loss over last few months. In the ED work up revealed large R sided pleural effusion. Admitted to [facility] for further management. Pulmonary is consulted for pleural effusion.   SOB started a few months ago, but it has been markedly worse for the last 5 days. Worsening symptoms happened suddenly but since then has progressive, now is improving. SOB both rest and activity. PRIEST after walking 100 feet. +ve orthopnea, no PND. +ve LE edema. Associated with CP and wheezing.   +ve cough, mild, for a few weeks. No sputum. No h/o hemoptysis.   Full ROS as below.   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    Change in bowel habit 09/13/2016    Increased bowel frequency    Chronic kidney disease     Chronic kidney disease, stage 3 unspecified (Multi) 06/12/2022    Chronic kidney disease (CKD), stage III (moderate)    Clotting disorder (Multi)     Diarrhea, unspecified 10/09/2017    Diarrhea, unspecified type    Diverticulitis of large intestine without perforation or abscess without bleeding 10/11/2017    Diverticulitis of colon    Diverticulosis of large intestine without perforation or abscess without bleeding 10/09/2017    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  bleeding    Dysphonia 04/28/2016    Dysphonia    Eczema     Essential (primary) hypertension 01/26/2019    Benign essential hypertension    Gastro-esophageal reflux disease without esophagitis 02/02/2020    GERD (gastroesophageal reflux disease)    Heart murmur     Nonrheumatic aortic (valve) insufficiency 12/07/2015    Nonrheumatic aortic valve insufficiency    Other conditions influencing health status     Osteoarthritis    Other fecal abnormalities 07/28/2015    Loose stools    Other specified noninflammatory disorders of vagina 07/17/2020    Vaginal irritation    Other specified noninflammatory disorders of vagina 05/25/2022    Vaginal irritation    Other specified symptoms and signs involving the circulatory and respiratory systems 04/28/2016    Abnormal lung sounds    Personal history of diseases of the blood and blood-forming organs and certain disorders involving the immune mechanism     History of anemia    Personal history of other diseases of the circulatory system     History of hypertension    Personal history of other diseases of the circulatory system     History of mitral valve prolapse    Personal history of other diseases of the digestive system     History of esophageal reflux    Personal history of other diseases of the respiratory system 12/19/2013    Personal history of asthma    Personal history of other endocrine, nutritional and metabolic disease 12/19/2013    History of hyperlipidemia    Primary osteoarthritis, unspecified shoulder 12/08/2014    Shoulder arthritis    Radiculopathy, lumbosacral region 05/07/2019    Lumbosacral radiculopathy at L4    Unilateral primary osteoarthritis, left knee 06/05/2017    Arthritis of left knee    Urinary tract infection     Varicella    Surgical History  Past Surgical History:   Procedure Laterality Date    ADENOIDECTOMY      CATARACT EXTRACTION  09/04/2015    Cataract Surgery    COLONOSCOPY  09/04/2015    Complete Colonoscopy    OTHER SURGICAL HISTORY   2015    Direct Laryngoscopy With Stripping Of Vocal Cords    OTHER SURGICAL HISTORY  2015    Vocal Cordectomy    SHOULDER SURGERY  2013    Shoulder Surgery    TONSILLECTOMY  2015    Tonsillectomy   Social History  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   Social History     Tobacco Use    Smoking status: Former     Current packs/day: 0.00     Types: Cigarettes     Quit date: 1988     Years since quittin.4    Smokeless tobacco: Never   Substance Use Topics    Alcohol use: Not Currently    Drug use: Never   Family History  CAD.   Current Outpatient Medications   Medication Instructions    acetaminophen (TYLENOL 8 HOUR) 650 mg, Every 8 hours    albuterol 90 mcg/actuation inhaler 2 puffs, inhalation, Every 4 hours PRN    amoxicillin (AMOXIL) 500 mg, 3 times daily    atorvastatin (LIPITOR) 40 mg, oral, Daily, as directed    cephalexin (KEFLEX) 250 mg, oral, Daily    cetirizine (ZYRTEC) 10 mg, Daily    cholecalciferol (Vitamin D-3) 50 mcg (2,000 unit) capsule 1 capsule, Daily    clobetasol (Temovate) 0.05 % cream 1 Application, Topical, 2 times daily PRN    cranberry fruit 465 mg, Daily    DULoxetine (CYMBALTA) 30 mg, oral, Daily, Do not crush or chew.    estradiol (Estrace) 0.01 % (0.1 mg/gram) vaginal cream Place a pea sized amount vaginally 3 times a week    famotidine (PEPCID) 20 mg, Nightly    fluconazole (Diflucan) 150 mg tablet Take one tablet now, wait three days and then take second tablet for vaginal discomfort.    furosemide (LASIX) 20 mg, Daily    inhalational spacing device inhaler Use as directed with inhalers    mirabegron (MYRBETRIQ) 50 mg, oral, Daily    mirtazapine (REMERON) 7.5 mg, Nightly    spironolactone (ALDACTONE) 25 mg, Daily   Allergies  Alendronate, Amoxicillin-pot clavulanate, Codeine, Dexlansoprazole, Diclofenac, House dust, Mold, Nitrofurantoin, Trimethoprim, and Sulfa (sulfonamide antibiotics)  Review of Systems   Constitutional:   Positive for appetite change, chills, fatigue and unexpected weight change. Negative for fever.   HENT:  Positive for postnasal drip and rhinorrhea. Negative for congestion, sinus pressure, sinus pain and sore throat.    Eyes:  Negative for pain, redness, itching and visual disturbance.   Respiratory:  Positive for cough, chest tightness, shortness of breath and wheezing.    Cardiovascular:  Positive for chest pain and leg swelling. Negative for palpitations.   Gastrointestinal:  Positive for diarrhea. Negative for abdominal pain, constipation, nausea and vomiting.   Genitourinary:  Negative for dysuria, frequency and hematuria.   Musculoskeletal:  Positive for arthralgias, back pain and myalgias. Negative for neck pain.   Skin:  Positive for rash (R chest.). Negative for pallor and wound.   Neurological:  Positive for dizziness and light-headedness. Negative for seizures, syncope and headaches.   Psychiatric/Behavioral:  Negative for confusion and dysphoric mood. The patient is not nervous/anxious.    Scheduled medications  atorvastatin, 40 mg, oral, Nightly  cetirizine, 10 mg, oral, Daily  cholecalciferol, 2,000 Units, oral, Daily  DULoxetine, 30 mg, oral, Daily  famotidine, 20 mg, oral, Nightly  furosemide, 20 mg, oral, Daily  heparin, 5,000 Units, subcutaneous, q12h  mirtazapine, 7.5 mg, oral, Nightly  polyethylene glycol, 17 g, oral, Daily  [Held by provider] spironolactone, 25 mg, oral, Daily    Continuous medications     PRN medications   Physical Exam  Constitutional:       General: She is not in acute distress.     Appearance: Normal appearance. She is not ill-appearing or toxic-appearing.      Comments: Thin   HENT:      Head: Normocephalic and atraumatic.      Nose:      Comments: On RA     Mouth/Throat:      Comments: Mallampati 2.   Eyes:      General: No scleral icterus.     Extraocular Movements: Extraocular movements intact.      Pupils: Pupils are equal, round, and reactive to light.  "  Cardiovascular:      Rate and Rhythm: Regular rhythm. Bradycardia present.      Heart sounds: No murmur heard.     No friction rub. No gallop.   Pulmonary:      Effort: Pulmonary effort is normal. No respiratory distress.      Breath sounds: No wheezing or rales.      Comments: Absent breath sounds R lung   Abdominal:      General: Abdomen is flat. Bowel sounds are normal. There is no distension.      Palpations: Abdomen is soft.      Tenderness: There is no abdominal tenderness.   Musculoskeletal:         General: Tenderness present.      Cervical back: Normal range of motion and neck supple. No rigidity or tenderness.      Right lower leg: Edema (Trace) present.      Left lower leg: Edema (Trace) present.   Lymphadenopathy:      Cervical: No cervical adenopathy.   Skin:     General: Skin is warm and dry.      Coloration: Skin is not jaundiced.   Neurological:      General: No focal deficit present.      Mental Status: She is oriented to person, place, and time.      Cranial Nerves: No cranial nerve deficit.      Motor: No weakness.   Psychiatric:         Mood and Affect: Mood normal.         Behavior: Behavior normal.     Vital Signs  Blood pressure 98/66, pulse 55, temperature 37.1 °C (98.7 °F), resp. rate 16, height 1.499 m (4' 11\"), weight 48.5 kg (107 lb), SpO2 95%.  Oxygen Therapy  SpO2: 95 %  Medical Gas Therapy: None (Room air)  Medical Gas Delivery Method: Nasal cannula       Relevant Results  XR chest 2 views 10/25/2024    Narrative  Interpreted By:  Anthony Davis,  STUDY:  XR CHEST 2 VIEWS; 10/25/2024 1:19 pm    INDICATION:  Signs/Symptoms:Dyspnea    COMPARISON:  December 2021.    ACCESSION NUMBER(S):  [id number]    ORDERING CLINICIAN:  MARINA KING    TECHNIQUE:  Number of films: Two-view radiographs of the chest were obtained.    FINDINGS:  The heart and mediastinum are normal.  There is interval development of large right pleural effusion with  right basilar infiltrates/atelectatic changes. The " left lung is  clear. There is no pneumothorax. The osseous structures are  unchanged; reversed arthroplasty hardware is again noted in the right  shoulder.    Impression  Interval development of large right effusion with right basilar  infiltrates/atelectatic changes. This may be due to pneumonia,  however malignant effusion can not be excluded. Correlate clinically  and follow-up as needed. In findings not resolve within 1-2 weeks,  further evaluation could be obtained with contrast-enhanced CT scan.  Alternatively thoracentesis could also be considered.    Signed by: Anthony Davis 10/25/2024 1:54 PM  Dictation workstation:   [id number]    No results found for this or any previous visit (from the past 24 hours).      Assessment/Plan   Patient with h/o asthma, allergies, lung cancer s/p radiation, throat cancer, CKD III, anemia, GERD, who p/w worsening SOB x 3 days. Associated with weight loss over last few months. In the ED work up revealed large R sided pleural effusion. Admitted to [facility] for further management. Pulmonary is consulted for pleural effusion.     Pleural effusion: new, given h/o smoking and also lung/throat cancer, concern for malignant effusion       Agree  with thoracentesis       Please send the samples for cytology, cultures, cell count, total protein.    Asthma: does not seem to be in acute exacerbation. On albuterol at home.        Albuterol as needed       Might need PFT as outpatient    SOB: likely due to above, but also CHF. Last echo showed HFpEF, but normal RV, RVSF, RVSP.       Volume status optimization as per primary team       Might benefit from repeat echo.    DVT prophylaxis: subcutaneous heparin.     Thank you for the consult.   Pulmonary will FU while in house     Gene Bailey MD

## 2024-10-26 NOTE — H&P
PRIMARY CARE PHYSICIAN:  Court Jerome MD [facility] ADMITTING PHYSICIAN Alana Kwan MD   MRN# [id number]   Admission Date: 10/25/2024     Subjective   CHIEF COMPLAINT: Shortness of breath    HISTORY OF PRESENT ILLNESS Kajal Null is a 89 y.o. 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 She denies any fever or chest pain. No hemoptysis. She has lost weight over few months.    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    Change in bowel habit 09/13/2016    Increased bowel frequency    Chronic kidney disease     Chronic kidney disease, stage 3 unspecified (Multi) 06/12/2022    Chronic kidney disease (CKD), stage III (moderate)    Clotting disorder (Multi)     Diarrhea, unspecified 10/09/2017    Diarrhea, unspecified type    Diverticulitis of large intestine without perforation or abscess without bleeding 10/11/2017    Diverticulitis of colon    Diverticulosis of large intestine without perforation or abscess without bleeding 10/09/2017    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 bleeding    Dysphonia 04/28/2016    Dysphonia    Eczema     Essential (primary) hypertension 01/26/2019    Benign essential hypertension    Gastro-esophageal reflux disease without esophagitis 02/02/2020    GERD (gastroesophageal reflux disease)    Heart murmur     Nonrheumatic aortic (valve) insufficiency 12/07/2015    Nonrheumatic aortic valve insufficiency    Other conditions influencing health status     Osteoarthritis     Other fecal abnormalities 07/28/2015    Loose stools    Other specified noninflammatory disorders of vagina 07/17/2020    Vaginal irritation    Other specified noninflammatory disorders of vagina 05/25/2022    Vaginal irritation    Other specified symptoms and signs involving the circulatory and respiratory systems 04/28/2016    Abnormal lung sounds    Personal history of diseases of the blood and blood-forming organs and certain disorders involving the immune mechanism     History of anemia    Personal history of other diseases of the circulatory system     History of hypertension    Personal history of other diseases of the circulatory system     History of mitral valve prolapse    Personal history of other diseases of the digestive system     History of esophageal reflux    Personal history of other diseases of the respiratory system 12/19/2013    Personal history of asthma    Personal history of other endocrine, nutritional and metabolic disease 12/19/2013    History of hyperlipidemia    Primary osteoarthritis, unspecified shoulder 12/08/2014    Shoulder arthritis    Radiculopathy, lumbosacral region 05/07/2019    Lumbosacral radiculopathy at L4    Unilateral primary osteoarthritis, left knee 06/05/2017    Arthritis of left knee    Urinary tract infection     Varicella         Past Surgical history  Past Surgical History:   Procedure Laterality Date    ADENOIDECTOMY      CATARACT EXTRACTION  09/04/2015    Cataract Surgery    COLONOSCOPY  09/04/2015    Complete Colonoscopy    OTHER SURGICAL HISTORY  09/04/2015    Direct Laryngoscopy With Stripping Of Vocal Cords    OTHER SURGICAL HISTORY  09/04/2015    Vocal Cordectomy    SHOULDER SURGERY  09/24/2013    Shoulder Surgery    TONSILLECTOMY  09/04/2015    Tonsillectomy       No family history on file.    Social History     Socioeconomic History    Marital status:    Tobacco Use    Smoking status: Former     Current packs/day: 0.00     Types: Cigarettes      Quit date: 1988     Years since quittin.4    Smokeless tobacco: Never   Substance and Sexual Activity    Alcohol use: Not Currently    Drug use: Never    Sexual activity: Not Currently     Social Drivers of Health     Financial Resource Strain: Low Risk  (10/25/2024)    Overall Financial Resource Strain (CARDIA)     Difficulty of Paying Living Expenses: Not hard at all   Food Insecurity: No Food Insecurity (10/25/2024)    Hunger Vital Sign     Worried About Running Out of Food in the Last Year: Never true     Ran Out of Food in the Last Year: Never true   Transportation Needs: No Transportation Needs (10/25/2024)    PRAPARE - Transportation     Lack of Transportation (Medical): No     Lack of Transportation (Non-Medical): No   Intimate Partner Violence: Not At Risk (10/25/2024)    Humiliation, Afraid, Rape, and Kick questionnaire     Fear of Current or Ex-Partner: No     Emotionally Abused: No     Physically Abused: No     Sexually Abused: No   Housing Stability: Low Risk  (10/25/2024)    Housing Stability Vital Sign     Unable to Pay for Housing in the Last Year: No     Number of Times Moved in the Last Year: 0     Homeless in the Last Year: No       Medications  Medications Prior to Admission   Medication Sig Dispense Refill Last Dose/Taking    acetaminophen (Tylenol 8 HOUR) 650 mg ER tablet Take 1 tablet (650 mg) by mouth every 8 hours. Do not crush, chew, or split.   10/25/2024 Morning    amoxicillin (Amoxil) 500 mg capsule Take 1 capsule (500 mg) by mouth 3 times a day.   10/25/2024 Morning    atorvastatin (Lipitor) 40 mg tablet TAKE 1 TABLET BY MOUTH EVERY DAY AS DIRECTED 90 tablet 1 10/24/2024 Evening    cephalexin (Keflex) 250 mg capsule Take 1 capsule (250 mg) by mouth once daily. 90 capsule 2 10/24/2024 Noon    cetirizine (ZyrTEC) 10 mg tablet Take 1 tablet (10 mg) by mouth once daily.   10/25/2024 Morning    cholecalciferol (Vitamin D-3) 50 mcg (2,000 unit) capsule Take 1 capsule (50 mcg) by  mouth early in the morning..   10/25/2024 Morning    cranberry fruit 465 mg capsule Take 465 mg by mouth early in the morning..   10/25/2024 Morning    estradiol (Estrace) 0.01 % (0.1 mg/gram) vaginal cream Place a pea sized amount vaginally 3 times a week (Patient taking differently: Insert 0.25 Applicatorfuls (1 g) into the vagina 3 (three) times a week. Place a pea sized amount vaginally 3 times a week) 42.5 g 2 Past Week    famotidine (Pepcid) 20 mg tablet Take 1 tablet (20 mg) by mouth once daily at bedtime.   10/24/2024 Evening    furosemide (Lasix) 20 mg tablet Take 1 tablet (20 mg) by mouth once daily.   10/25/2024 Morning    mirabegron (Myrbetriq) 50 mg tablet extended release 24 hr 24 hr tablet Take 1 tablet (50 mg) by mouth once daily. 90 tablet 3 10/24/2024 Evening    mirtazapine (Remeron) 7.5 mg tablet Take 1 tablet (7.5 mg) by mouth once daily at bedtime.   10/24/2024 Bedtime    spironolactone (Aldactone) 25 mg tablet Take 1 tablet (25 mg) by mouth once daily.   10/24/2024 Noon    albuterol 90 mcg/actuation inhaler Inhale 2 puffs every 4 hours if needed for shortness of breath. 18 g 2     clobetasol (Temovate) 0.05 % cream Apply 1 Application topically 2 times a day as needed (irritation).       DULoxetine (Cymbalta) 30 mg DR capsule Take 1 capsule (30 mg) by mouth once daily. Do not crush or chew. 30 capsule 11     fluconazole (Diflucan) 150 mg tablet Take one tablet now, wait three days and then take second tablet for vaginal discomfort. 2 tablet 0     inhalational spacing device inhaler Use as directed with inhalers 1 each 0         Allergies   Allergen Reactions    Alendronate Other    Amoxicillin-Pot Clavulanate Diarrhea    Codeine GI intolerance, GI Upset and Hives    Dexlansoprazole Nausea/vomiting and Nausea Only    Diclofenac Diarrhea    House Dust Unknown    Mold Unknown    Nitrofurantoin GI Upset    Trimethoprim Diarrhea    Sulfa (Sulfonamide Antibiotics) Rash       Complete Review of  "symptoms  GENERAL: No or fever  HEENT: No, blurred vision, or hearing loss  CARDIAC: See HPI  RESPIRATORY: No or hemoptysis  GI: No nausea, vomiting, diarrhea, or constipation  : No or dysuria  SKIN: no rash, itching discoloration, jaundice or rash  ENDOCRINE: no or cold intolerance  PSYCH: No anxiety, mood disorder hallucinations or psychiatric symptoms  NEURO: No, blurred vision, diplopia, facial droop, or word finding difficulties    Objective     PHYSICAL EXAM:  Patient Vitals for the past 24 hrs:   BP Temp Temp src Pulse Resp SpO2 Height Weight   10/26/24 0730 121/75 36.1 °C (97 °F) -- 70 16 98 % -- --   10/26/24 0500 132/78 36.6 °C (97.8 °F) Temporal 71 18 93 % -- --   10/26/24 0044 129/76 36.3 °C (97.3 °F) Temporal 62 18 98 % -- --   10/25/24 1928 176/72 36.3 °C (97.3 °F) Temporal 73 18 92 % -- --   10/25/24 1900 101/80 -- -- 65 -- -- -- --   10/25/24 1800 99/87 -- -- 75 16 97 % -- --   10/25/24 1700 (!) 124/92 -- -- 83 -- (!) 93 % -- --   10/25/24 1600 108/67 -- -- 72 20 95 % -- --   10/25/24 1238 (!) 144/103 36.1 °C (97 °F) Temporal 99 20 94 % 1.499 m (4' 11\") 48.5 kg (107 lb)     Blood pressure 121/75, pulse 70, temperature 36.1 °C (97 °F), resp. rate 16, height 1.499 m (4' 11\"), weight 48.5 kg (107 lb), SpO2 98%.  Patient Vitals for the past 24 hrs:   BP Temp Temp src Pulse Resp SpO2 Height Weight   10/26/24 0730 121/75 36.1 °C (97 °F) -- 70 16 98 % -- --   10/26/24 0500 132/78 36.6 °C (97.8 °F) Temporal 71 18 93 % -- --   10/26/24 0044 129/76 36.3 °C (97.3 °F) Temporal 62 18 98 % -- --   10/25/24 1928 176/72 36.3 °C (97.3 °F) Temporal 73 18 92 % -- --   10/25/24 1900 101/80 -- -- 65 -- -- -- --   10/25/24 1800 99/87 -- -- 75 16 97 % -- --   10/25/24 1700 (!) 124/92 -- -- 83 -- (!) 93 % -- --   10/25/24 1600 108/67 -- -- 72 20 95 % -- --   10/25/24 1238 (!) 144/103 36.1 °C (97 °F) Temporal 99 20 94 % 1.499 m (4' 11\") 48.5 kg (107 lb)     Body mass index is 21.61 kg/m².  GENERAL: alert, cooperative, or " no distress  SKIN: no rashes  HEENT Atraumatic, Normocephalic and Not pale, no icterus  NECK: supple, no thyromegaly, JVP within normal limits  LUNGS: not in respiratory distress, respiratory rate normal, clear to auscultation reduced breath sounds right side with stony dullness at the base  CARDIAC: rate regular and regular rhythm,, normal S1, S2, no murmur, rub, or gallop heard.  ABDOMEN: Soft, non-tender, normal bowel sounds; no bruits, organomegaly or masses.  EXTREMETIES: No edema  NEURO: Alert and oriented x 3,   ., reflexes normal and symmetric, strength and  sensation grossly normal    DATA:   Diagnostic tests reviewed for today's visit:    Most recent  labs and imaging results  Results for orders placed or performed during the hospital encounter of 10/25/24 (from the past 96 hours)   ECG 12 lead   Result Value Ref Range    Ventricular Rate 97 BPM    Atrial Rate 97 BPM    FL Interval 156 ms    QRS Duration 100 ms    QT Interval 344 ms    QTC Calculation(Bazett) 436 ms    P Axis 67 degrees    R Axis 27 degrees    T Axis 58 degrees    QRS Count 16 beats    Q Onset 226 ms    P Onset 148 ms    P Offset 195 ms    T Offset 398 ms    QTC Fredericia 403 ms   Sars-CoV-2 PCR   Result Value Ref Range    Coronavirus 2019, PCR Not Detected Not Detected   Influenza A, and B PCR   Result Value Ref Range    Flu A Result Not Detected Not Detected    Flu B Result Not Detected Not Detected   CBC and Auto Differential   Result Value Ref Range    WBC 6.3 4.4 - 11.3 x10*3/uL    nRBC 0.0 0.0 - 0.0 /100 WBCs    RBC 3.32 (L) 4.00 - 5.20 x10*6/uL    Hemoglobin 9.8 (L) 12.0 - 16.0 g/dL    Hematocrit 31.5 (L) 36.0 - 46.0 %    MCV 95 80 - 100 fL    MCH 29.5 26.0 - 34.0 pg    MCHC 31.1 (L) 32.0 - 36.0 g/dL    RDW 14.0 11.5 - 14.5 %    Platelets 227 150 - 450 x10*3/uL    Neutrophils % 83.4 40.0 - 80.0 %    Immature Granulocytes %, Automated 0.5 0.0 - 0.9 %    Lymphocytes % 7.2 13.0 - 44.0 %    Monocytes % 7.3 2.0 - 10.0 %    Eosinophils %  1.3 0.0 - 6.0 %    Basophils % 0.3 0.0 - 2.0 %    Neutrophils Absolute 5.24 1.60 - 5.50 x10*3/uL    Immature Granulocytes Absolute, Automated 0.03 0.00 - 0.50 x10*3/uL    Lymphocytes Absolute 0.45 (L) 0.80 - 3.00 x10*3/uL    Monocytes Absolute 0.46 0.05 - 0.80 x10*3/uL    Eosinophils Absolute 0.08 0.00 - 0.40 x10*3/uL    Basophils Absolute 0.02 0.00 - 0.10 x10*3/uL   Comprehensive metabolic panel   Result Value Ref Range    Glucose 92 74 - 99 mg/dL    Sodium 134 (L) 136 - 145 mmol/L    Potassium 5.1 3.5 - 5.3 mmol/L    Chloride 101 98 - 107 mmol/L    Bicarbonate 22 21 - 32 mmol/L    Anion Gap 16 10 - 20 mmol/L    Urea Nitrogen 65 (H) 6 - 23 mg/dL    Creatinine 2.57 (H) 0.50 - 1.05 mg/dL    eGFR 18 (L) >60 mL/min/1.73m*2    Calcium 9.7 8.6 - 10.3 mg/dL    Albumin 3.7 3.4 - 5.0 g/dL    Alkaline Phosphatase 82 33 - 136 U/L    Total Protein 6.4 6.4 - 8.2 g/dL    AST 25 9 - 39 U/L    Bilirubin, Total 0.4 0.0 - 1.2 mg/dL    ALT 19 7 - 45 U/L   B-Type Natriuretic Peptide   Result Value Ref Range    BNP 39 0 - 99 pg/mL   Troponin I, High Sensitivity, Initial   Result Value Ref Range    Troponin I, High Sensitivity 12 0 - 13 ng/L   Urinalysis with Reflex Culture and Microscopic   Result Value Ref Range    Color, Urine Colorless (N) Light-Yellow, Yellow, Dark-Yellow    Appearance, Urine Clear Clear    Specific Gravity, Urine 1.008 1.005 - 1.035    pH, Urine 5.0 5.0, 5.5, 6.0, 6.5, 7.0, 7.5, 8.0    Protein, Urine NEGATIVE NEGATIVE, 10 (TRACE), 20 (TRACE) mg/dL    Glucose, Urine Normal Normal mg/dL    Blood, Urine NEGATIVE NEGATIVE    Ketones, Urine NEGATIVE NEGATIVE mg/dL    Bilirubin, Urine NEGATIVE NEGATIVE    Urobilinogen, Urine Normal Normal mg/dL    Nitrite, Urine NEGATIVE NEGATIVE    Leukocyte Esterase, Urine NEGATIVE NEGATIVE   Extra Urine Gray Tube   Result Value Ref Range    Extra Tube Hold for add-ons.    Troponin, High Sensitivity, 1 Hour   Result Value Ref Range    Troponin I, High Sensitivity 12 0 - 13 ng/L         CT chest wo IV contrast    Result Date: 10/25/2024  Interpreted By:  Chyna Nguyen, STUDY: CT CHEST WO IV CONTRAST;  10/25/2024 3:25 pm   INDICATION: Signs/Symptoms:Dyspnea with exertion, cancer history.   COMPARISON: 03/12/2004   ACCESSION NUMBER(S): [id number]   ORDERING CLINICIAN: MARINA KING   TECHNIQUE: Serial axial CT images of the chest obtained without intravenous contrast. Sagittal and coronal reconstructions were generated.   FINDINGS: VESSELS: There are atherosclerotic changes of the aorta. The cava and main pulmonary arteries are unremarkable. The patient appears to be status post aortic valve surgery.   HEART:  The heart is normal in size. There are what appear to be dense calcifications along the mitral annulus.   MEDIASTINUM AND KYLAH: There is no significant mediastinal lymphadenopathy.   LUNG, PLEURA, AND LARGE AIRWAYS: There is a large right pleural effusion.   There is consolidation and loss of volume of the right lower lobe.   CHEST WALL AND LOWER NECK: Thyroid as visualized is unremarkable   BONES: There are degenerative changes of the spine.   UPPER ABDOMEN: There are liver lesions. There are splenic lesions.   COMPARISON TO THE PRIOR EXAM: The splenic lesions were not obvious previously. The pleural effusion was not obvious previously.       Large right pleural effusion. Right lower lobe consolidation and loss of volume.   Splenic hypodensities.   Heterogeneity of the liver is suggested.   Signed by: Chyna Nguyen 10/25/2024 3:59 PM Dictation workstation:   [id number]    ECG 12 lead    Result Date: 10/25/2024  Normal sinus rhythm Cannot rule out Anterior infarct , age undetermined Abnormal ECG When compared with ECG of 27-DEC-2018 12:58, No significant change was found    XR chest 2 views    Result Date: 10/25/2024  Interpreted By:  Anthony Davis, STUDY: XR CHEST 2 VIEWS; 10/25/2024 1:19 pm   INDICATION: Signs/Symptoms:Dyspnea   COMPARISON: December 2021.   ACCESSION NUMBER(S):  [id number]   ORDERING CLINICIAN: MARINA KING   TECHNIQUE: Number of films: Two-view radiographs of the chest were obtained.   FINDINGS: The heart and mediastinum are normal. There is interval development of large right pleural effusion with right basilar infiltrates/atelectatic changes. The left lung is clear. There is no pneumothorax. The osseous structures are unchanged; reversed arthroplasty hardware is again noted in the right shoulder.       Interval development of large right effusion with right basilar infiltrates/atelectatic changes. This may be due to pneumonia, however malignant effusion can not be excluded. Correlate clinically and follow-up as needed. In findings not resolve within 1-2 weeks, further evaluation could be obtained with contrast-enhanced CT scan. Alternatively thoracentesis could also be considered.   Signed by: Anthony Davis 10/25/2024 1:54 PM Dictation workstation:   [id number]    [unfilled]   Medication and Non-Pharmacologic VTE Prophylaxis/Anticoagulants   Last Anticoag Admin            heparin (porcine) injection 5,000 Units    Given 5,000 Units at 10/25/24 0935    Frequency: Every 12 hours         No unadministered anticoagulant orders found.            Assessment/Plan     Kajal Null  has    Principal Problem:    Pleural effusion ?malignant     Will consult IR for thoracentesis   Other Hospital problems        Abnormal findings not addressed during hospitalization, but require out patient follow up. None        I spent 75 minutes taking history and examining Kajal Null, reviewing the labs & imaging results, reviewing past hospital encounters,  speaking with & reviewing notes from emergency room physician.  SIGNATURE: Alana Kwan MD PATIENT NAME: Kajal Null   DATE: October 26, 2024 MRN: [id number]   TIME: 9:23 AM

## 2024-10-26 NOTE — H&P (VIEW-ONLY)
Inpatient consult to Pulmonology  Consult performed by: Gene Bailey MD  Consult ordered by: Alana Kwan MD      Reason For Consult  Pleural effusion.   History Of Present Illness  Kajal Null is a 89 y.o. female with h/o asthma, allergies, lung cancer s/p radiation, throat cancer, CKD III, anemia, GERD, who p/w worsening SOB x 3 days. Associated with weight loss over last few months. In the ED work up revealed large R sided pleural effusion. Admitted to [facility] for further management. Pulmonary is consulted for pleural effusion.   SOB started a few months ago, but it has been markedly worse for the last 5 days. Worsening symptoms happened suddenly but since then has progressive, now is improving. SOB both rest and activity. PRIEST after walking 100 feet. +ve orthopnea, no PND. +ve LE edema. Associated with CP and wheezing.   +ve cough, mild, for a few weeks. No sputum. No h/o hemoptysis.   Full ROS as below.   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    Change in bowel habit 09/13/2016    Increased bowel frequency    Chronic kidney disease     Chronic kidney disease, stage 3 unspecified (Multi) 06/12/2022    Chronic kidney disease (CKD), stage III (moderate)    Clotting disorder (Multi)     Diarrhea, unspecified 10/09/2017    Diarrhea, unspecified type    Diverticulitis of large intestine without perforation or abscess without bleeding 10/11/2017    Diverticulitis of colon    Diverticulosis of large intestine without perforation or abscess without bleeding 10/09/2017    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  bleeding    Dysphonia 04/28/2016    Dysphonia    Eczema     Essential (primary) hypertension 01/26/2019    Benign essential hypertension    Gastro-esophageal reflux disease without esophagitis 02/02/2020    GERD (gastroesophageal reflux disease)    Heart murmur     Nonrheumatic aortic (valve) insufficiency 12/07/2015    Nonrheumatic aortic valve insufficiency    Other conditions influencing health status     Osteoarthritis    Other fecal abnormalities 07/28/2015    Loose stools    Other specified noninflammatory disorders of vagina 07/17/2020    Vaginal irritation    Other specified noninflammatory disorders of vagina 05/25/2022    Vaginal irritation    Other specified symptoms and signs involving the circulatory and respiratory systems 04/28/2016    Abnormal lung sounds    Personal history of diseases of the blood and blood-forming organs and certain disorders involving the immune mechanism     History of anemia    Personal history of other diseases of the circulatory system     History of hypertension    Personal history of other diseases of the circulatory system     History of mitral valve prolapse    Personal history of other diseases of the digestive system     History of esophageal reflux    Personal history of other diseases of the respiratory system 12/19/2013    Personal history of asthma    Personal history of other endocrine, nutritional and metabolic disease 12/19/2013    History of hyperlipidemia    Primary osteoarthritis, unspecified shoulder 12/08/2014    Shoulder arthritis    Radiculopathy, lumbosacral region 05/07/2019    Lumbosacral radiculopathy at L4    Unilateral primary osteoarthritis, left knee 06/05/2017    Arthritis of left knee    Urinary tract infection     Varicella    Surgical History  Past Surgical History:   Procedure Laterality Date    ADENOIDECTOMY      CATARACT EXTRACTION  09/04/2015    Cataract Surgery    COLONOSCOPY  09/04/2015    Complete Colonoscopy    OTHER SURGICAL HISTORY   2015    Direct Laryngoscopy With Stripping Of Vocal Cords    OTHER SURGICAL HISTORY  2015    Vocal Cordectomy    SHOULDER SURGERY  2013    Shoulder Surgery    TONSILLECTOMY  2015    Tonsillectomy   Social History  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   Social History     Tobacco Use    Smoking status: Former     Current packs/day: 0.00     Types: Cigarettes     Quit date: 1988     Years since quittin.4    Smokeless tobacco: Never   Substance Use Topics    Alcohol use: Not Currently    Drug use: Never   Family History  CAD.   Current Outpatient Medications   Medication Instructions    acetaminophen (TYLENOL 8 HOUR) 650 mg, Every 8 hours    albuterol 90 mcg/actuation inhaler 2 puffs, inhalation, Every 4 hours PRN    amoxicillin (AMOXIL) 500 mg, 3 times daily    atorvastatin (LIPITOR) 40 mg, oral, Daily, as directed    cephalexin (KEFLEX) 250 mg, oral, Daily    cetirizine (ZYRTEC) 10 mg, Daily    cholecalciferol (Vitamin D-3) 50 mcg (2,000 unit) capsule 1 capsule, Daily    clobetasol (Temovate) 0.05 % cream 1 Application, Topical, 2 times daily PRN    cranberry fruit 465 mg, Daily    DULoxetine (CYMBALTA) 30 mg, oral, Daily, Do not crush or chew.    estradiol (Estrace) 0.01 % (0.1 mg/gram) vaginal cream Place a pea sized amount vaginally 3 times a week    famotidine (PEPCID) 20 mg, Nightly    fluconazole (Diflucan) 150 mg tablet Take one tablet now, wait three days and then take second tablet for vaginal discomfort.    furosemide (LASIX) 20 mg, Daily    inhalational spacing device inhaler Use as directed with inhalers    mirabegron (MYRBETRIQ) 50 mg, oral, Daily    mirtazapine (REMERON) 7.5 mg, Nightly    spironolactone (ALDACTONE) 25 mg, Daily   Allergies  Alendronate, Amoxicillin-pot clavulanate, Codeine, Dexlansoprazole, Diclofenac, House dust, Mold, Nitrofurantoin, Trimethoprim, and Sulfa (sulfonamide antibiotics)  Review of Systems   Constitutional:   Positive for appetite change, chills, fatigue and unexpected weight change. Negative for fever.   HENT:  Positive for postnasal drip and rhinorrhea. Negative for congestion, sinus pressure, sinus pain and sore throat.    Eyes:  Negative for pain, redness, itching and visual disturbance.   Respiratory:  Positive for cough, chest tightness, shortness of breath and wheezing.    Cardiovascular:  Positive for chest pain and leg swelling. Negative for palpitations.   Gastrointestinal:  Positive for diarrhea. Negative for abdominal pain, constipation, nausea and vomiting.   Genitourinary:  Negative for dysuria, frequency and hematuria.   Musculoskeletal:  Positive for arthralgias, back pain and myalgias. Negative for neck pain.   Skin:  Positive for rash (R chest.). Negative for pallor and wound.   Neurological:  Positive for dizziness and light-headedness. Negative for seizures, syncope and headaches.   Psychiatric/Behavioral:  Negative for confusion and dysphoric mood. The patient is not nervous/anxious.    Scheduled medications  atorvastatin, 40 mg, oral, Nightly  cetirizine, 10 mg, oral, Daily  cholecalciferol, 2,000 Units, oral, Daily  DULoxetine, 30 mg, oral, Daily  famotidine, 20 mg, oral, Nightly  furosemide, 20 mg, oral, Daily  heparin, 5,000 Units, subcutaneous, q12h  mirtazapine, 7.5 mg, oral, Nightly  polyethylene glycol, 17 g, oral, Daily  [Held by provider] spironolactone, 25 mg, oral, Daily    Continuous medications     PRN medications   Physical Exam  Constitutional:       General: She is not in acute distress.     Appearance: Normal appearance. She is not ill-appearing or toxic-appearing.      Comments: Thin   HENT:      Head: Normocephalic and atraumatic.      Nose:      Comments: On RA     Mouth/Throat:      Comments: Mallampati 2.   Eyes:      General: No scleral icterus.     Extraocular Movements: Extraocular movements intact.      Pupils: Pupils are equal, round, and reactive to light.  "  Cardiovascular:      Rate and Rhythm: Regular rhythm. Bradycardia present.      Heart sounds: No murmur heard.     No friction rub. No gallop.   Pulmonary:      Effort: Pulmonary effort is normal. No respiratory distress.      Breath sounds: No wheezing or rales.      Comments: Absent breath sounds R lung   Abdominal:      General: Abdomen is flat. Bowel sounds are normal. There is no distension.      Palpations: Abdomen is soft.      Tenderness: There is no abdominal tenderness.   Musculoskeletal:         General: Tenderness present.      Cervical back: Normal range of motion and neck supple. No rigidity or tenderness.      Right lower leg: Edema (Trace) present.      Left lower leg: Edema (Trace) present.   Lymphadenopathy:      Cervical: No cervical adenopathy.   Skin:     General: Skin is warm and dry.      Coloration: Skin is not jaundiced.   Neurological:      General: No focal deficit present.      Mental Status: She is oriented to person, place, and time.      Cranial Nerves: No cranial nerve deficit.      Motor: No weakness.   Psychiatric:         Mood and Affect: Mood normal.         Behavior: Behavior normal.     Vital Signs  Blood pressure 98/66, pulse 55, temperature 37.1 °C (98.7 °F), resp. rate 16, height 1.499 m (4' 11\"), weight 48.5 kg (107 lb), SpO2 95%.  Oxygen Therapy  SpO2: 95 %  Medical Gas Therapy: None (Room air)  Medical Gas Delivery Method: Nasal cannula       Relevant Results  XR chest 2 views 10/25/2024    Narrative  Interpreted By:  Anthony Davis,  STUDY:  XR CHEST 2 VIEWS; 10/25/2024 1:19 pm    INDICATION:  Signs/Symptoms:Dyspnea    COMPARISON:  December 2021.    ACCESSION NUMBER(S):  [id number]    ORDERING CLINICIAN:  MARINA KING    TECHNIQUE:  Number of films: Two-view radiographs of the chest were obtained.    FINDINGS:  The heart and mediastinum are normal.  There is interval development of large right pleural effusion with  right basilar infiltrates/atelectatic changes. The " left lung is  clear. There is no pneumothorax. The osseous structures are  unchanged; reversed arthroplasty hardware is again noted in the right  shoulder.    Impression  Interval development of large right effusion with right basilar  infiltrates/atelectatic changes. This may be due to pneumonia,  however malignant effusion can not be excluded. Correlate clinically  and follow-up as needed. In findings not resolve within 1-2 weeks,  further evaluation could be obtained with contrast-enhanced CT scan.  Alternatively thoracentesis could also be considered.    Signed by: Anthony Davis 10/25/2024 1:54 PM  Dictation workstation:   [id number]    No results found for this or any previous visit (from the past 24 hours).      Assessment/Plan   Patient with h/o asthma, allergies, lung cancer s/p radiation, throat cancer, CKD III, anemia, GERD, who p/w worsening SOB x 3 days. Associated with weight loss over last few months. In the ED work up revealed large R sided pleural effusion. Admitted to [facility] for further management. Pulmonary is consulted for pleural effusion.     Pleural effusion: new, given h/o smoking and also lung/throat cancer, concern for malignant effusion       Agree  with thoracentesis       Please send the samples for cytology, cultures, cell count, total protein.    Asthma: does not seem to be in acute exacerbation. On albuterol at home.        Albuterol as needed       Might need PFT as outpatient    SOB: likely due to above, but also CHF. Last echo showed HFpEF, but normal RV, RVSF, RVSP.       Volume status optimization as per primary team       Might benefit from repeat echo.    DVT prophylaxis: subcutaneous heparin.     Thank you for the consult.   Pulmonary will FU while in house     Gene Bailey MD

## 2024-10-27 VITALS
HEIGHT: 59 IN | OXYGEN SATURATION: 93 % | SYSTOLIC BLOOD PRESSURE: 125 MMHG | HEART RATE: 72 BPM | DIASTOLIC BLOOD PRESSURE: 77 MMHG | WEIGHT: 107 LBS | BODY MASS INDEX: 21.57 KG/M2 | TEMPERATURE: 98 F | RESPIRATION RATE: 16 BRPM

## 2024-10-27 PROCEDURE — 2500000001 HC RX 250 WO HCPCS SELF ADMINISTERED DRUGS (ALT 637 FOR MEDICARE OP): Performed by: INTERNAL MEDICINE

## 2024-10-27 PROCEDURE — 1100000001 HC PRIVATE ROOM DAILY

## 2024-10-27 PROCEDURE — 2500000004 HC RX 250 GENERAL PHARMACY W/ HCPCS (ALT 636 FOR OP/ED): Performed by: INTERNAL MEDICINE

## 2024-10-27 PROCEDURE — 99233 SBSQ HOSP IP/OBS HIGH 50: CPT | Performed by: INTERNAL MEDICINE

## 2024-10-27 RX ADMIN — Medication 2000 UNITS: at 06:50

## 2024-10-27 RX ADMIN — DULOXETINE HYDROCHLORIDE 30 MG: 30 CAPSULE, DELAYED RELEASE ORAL at 08:43

## 2024-10-27 RX ADMIN — FAMOTIDINE 20 MG: 20 TABLET, FILM COATED ORAL at 20:41

## 2024-10-27 RX ADMIN — POLYETHYLENE GLYCOL 3350 17 G: 17 POWDER, FOR SOLUTION ORAL at 13:46

## 2024-10-27 RX ADMIN — MIRTAZAPINE 7.5 MG: 15 TABLET, FILM COATED ORAL at 20:41

## 2024-10-27 RX ADMIN — FUROSEMIDE 20 MG: 20 TABLET ORAL at 08:43

## 2024-10-27 RX ADMIN — HEPARIN SODIUM 5000 UNITS: 5000 INJECTION INTRAVENOUS; SUBCUTANEOUS at 18:24

## 2024-10-27 RX ADMIN — CETIRIZINE HYDROCHLORIDE 10 MG: 10 TABLET, FILM COATED ORAL at 08:43

## 2024-10-27 RX ADMIN — ATORVASTATIN CALCIUM 40 MG: 40 TABLET, FILM COATED ORAL at 20:41

## 2024-10-27 RX ADMIN — HEPARIN SODIUM 5000 UNITS: 5000 INJECTION INTRAVENOUS; SUBCUTANEOUS at 06:50

## 2024-10-27 ASSESSMENT — COGNITIVE AND FUNCTIONAL STATUS - GENERAL
MOBILITY SCORE: 21
STANDING UP FROM CHAIR USING ARMS: A LITTLE
DRESSING REGULAR LOWER BODY CLOTHING: A LITTLE
DAILY ACTIVITIY SCORE: 22
CLIMB 3 TO 5 STEPS WITH RAILING: A LITTLE
DRESSING REGULAR UPPER BODY CLOTHING: A LITTLE
WALKING IN HOSPITAL ROOM: A LITTLE

## 2024-10-27 ASSESSMENT — PAIN SCALES - GENERAL
PAINLEVEL_OUTOF10: 0 - NO PAIN
PAINLEVEL_OUTOF10: 0 - NO PAIN

## 2024-10-27 NOTE — CARE PLAN
The patient's goals for the shift include   remain stable and free from injury     The clinical goals for the shift include have procedure    Over the shift, the patient did make progress toward the following goals.

## 2024-10-27 NOTE — PROGRESS NOTES
Kajal Null is a 89 y.o. female on day 2 of admission presenting with Pleural effusion.  Patient with h/o asthma, allergies, lung cancer s/p radiation, throat cancer, CKD III, anemia, GERD, who p/w worsening SOB x 3 days. Associated with weight loss over last few months. In the ED work up revealed large R sided pleural effusion. Admitted to [facility] for further management. Pulmonary is consulted for pleural effusion.   Subjective   No acute overnight events. Remains On RA.    Had some shortness of breath earlier today with walking. Now improved. Also mild cough, wheezing and pain all over her body. Denies sputum production.   Objective   Scheduled medications  atorvastatin, 40 mg, oral, Nightly  cetirizine, 10 mg, oral, Daily  cholecalciferol, 2,000 Units, oral, Daily  DULoxetine, 30 mg, oral, Daily  famotidine, 20 mg, oral, Nightly  furosemide, 20 mg, oral, Daily  heparin, 5,000 Units, subcutaneous, q12h  mirtazapine, 7.5 mg, oral, Nightly  polyethylene glycol, 17 g, oral, Daily  [Held by provider] spironolactone, 25 mg, oral, Daily    Continuous medications     PRN medications  Physical Exam  Constitutional:       General: She is not in acute distress.     Appearance: Normal appearance. She is normal weight. She is not ill-appearing or toxic-appearing.   HENT:      Head: Normocephalic and atraumatic.      Nose:      Comments: On RA.      Mouth/Throat:      Mouth: Mucous membranes are moist.      Comments: Mallampati 2  Eyes:      General: No scleral icterus.     Extraocular Movements: Extraocular movements intact.      Pupils: Pupils are equal, round, and reactive to light.   Cardiovascular:      Rate and Rhythm: Normal rate and regular rhythm.      Heart sounds: No murmur heard.     No friction rub. No gallop.   Pulmonary:      Effort: Pulmonary effort is normal. No respiratory distress.      Breath sounds: No wheezing or rales.      Comments: absent breath sounds R lung  Abdominal:      General: Bowel sounds are  "normal. There is no distension.      Palpations: Abdomen is soft.      Tenderness: There is no abdominal tenderness.   Musculoskeletal:         General: Tenderness present. Normal range of motion.      Cervical back: Normal range of motion and neck supple. No rigidity or tenderness.      Right lower leg: No edema.      Left lower leg: No edema.   Lymphadenopathy:      Cervical: No cervical adenopathy.   Skin:     General: Skin is warm and dry.      Coloration: Skin is not jaundiced.   Neurological:      General: No focal deficit present.      Mental Status: She is alert and oriented to person, place, and time.      Cranial Nerves: No cranial nerve deficit.      Motor: No weakness.   Psychiatric:         Mood and Affect: Mood normal.         Behavior: Behavior normal.     Last Recorded Vitals  Blood pressure 116/68, pulse 69, temperature 37.1 °C (98.7 °F), resp. rate 16, height 1.499 m (4' 11\"), weight 48.5 kg (107 lb), SpO2 92%.  Intake/Output last 3 Shifts:  I/O last 3 completed shifts:  In: 1100 (22.7 mL/kg) [P.O.:800; IV Piggyback:300]  Out: - (0 mL/kg)   Weight: 48.5 kg     Relevant Results  No results found for this or any previous visit (from the past 24 hours).   No results found.   Assessment/Plan   Assessment & Plan  Pleural effusion    89 YOF with h/o asthma, allergies, lung cancer s/p radiation, throat cancer, CKD III, anemia, GERD, who p/w worsening SOB x 3 days. Associated with weight loss over last few months. In the ED work up revealed large R sided pleural effusion. Admitted to [facility] for further management. Pulmonary is consulted for pleural effusion.      Pleural effusion: new, given h/o smoking and also lung/throat cancer, concern for malignant effusion       Agree with thoracentesis       Please send the samples for cytology, cultures, cell count, total protein.     Asthma: does not seem to be in acute exacerbation. On albuterol at home.        Albuterol as needed       Might need PFT as " outpatient     SOB: likely due to above, but also CHF. Last echo showed HFpEF, but normal RV, RVSF, RVSP.       Volume status optimization as per primary team       2D echo.     DVT prophylaxis: subcutaneous heparin.      Pulmonary will FU while in house     Gene Bailey MD

## 2024-10-27 NOTE — PROGRESS NOTES
"[facility]– [place]          Admitting Provider: Alana Kwan MD Admission Date: 10/25/2024.   Attending Provider: Alana Kwan MD MRN: [id number]       Dell Null is a 89 y.o. female on day 2 of admission presenting with Pleural effusion.  Interval History no new complaints.     Objective   Physical Exam  Last Recorded Vitals: Blood pressure 116/68, pulse 69, temperature 37.1 °C (98.7 °F), resp. rate 16, height 1.499 m (4' 11\"), weight 48.5 kg (107 lb), SpO2 92%.  Patient Vitals for the past 24 hrs:   BP Temp Temp src Pulse Resp SpO2   10/27/24 0754 116/68 37.1 °C (98.7 °F) -- 69 16 92 %   10/27/24 0033 148/74 36.4 °C (97.5 °F) Temporal 65 16 93 %   10/26/24 1610 98/66 37.1 °C (98.7 °F) -- 55 16 95 %   10/26/24 1000 -- -- -- -- -- 97 %     Body mass index is 21.61 kg/m².  GENERAL: alert, cooperative, or no distress  SKIN: no rashes  HEENT Atraumatic, Normocephalic and Not pale, no icterus  NECK: supple, no thyromegaly, JVP within normal limits  LUNGS: not in respiratory distress, respiratory rate normal, clear to auscultation reduced breath sounds r/s  CARDIAC: regular rate and rhythm, normal S1 and S2, no murmur, rub, or gallop  ABDOMEN: Soft, non-tender, normal bowel sounds; no bruits, organomegaly or masses.  EXTREMITIES: No edema  NEURO: Alert and oriented x 3 , gait normal ., reflexes normal and symmetric, strength and  sensation grossly normal  Intake/Output last 3 Shifts:  I/O last 3 completed shifts:  In: 1100 (22.7 mL/kg) [P.O.:800; IV Piggyback:300]  Out: - (0 mL/kg)   Weight: 48.5 kg   DATA:   Diagnostic tests reviewed for today's visit:    Most recent  labs and imaging results  No results found for this or any previous visit (from the past 24 hours).  Urine Culture   Date Value Ref Range Status   10/07/2024 No significant growth  Final    No results found for: \"RESPCULTSM\" No results found for: \"PERDIAFLDCUL\" No results found for: \"STERFLDCULSM\"   CT chest wo " IV contrast    Result Date: 10/25/2024  Interpreted By:  Chyna Nguyen, STUDY: CT CHEST WO IV CONTRAST;  10/25/2024 3:25 pm   INDICATION: Signs/Symptoms:Dyspnea with exertion, cancer history.   COMPARISON: 03/12/2004   ACCESSION NUMBER(S): [id number]   ORDERING CLINICIAN: MARINA KING   TECHNIQUE: Serial axial CT images of the chest obtained without intravenous contrast. Sagittal and coronal reconstructions were generated.   FINDINGS: VESSELS: There are atherosclerotic changes of the aorta. The cava and main pulmonary arteries are unremarkable. The patient appears to be status post aortic valve surgery.   HEART:  The heart is normal in size. There are what appear to be dense calcifications along the mitral annulus.   MEDIASTINUM AND KYLAH: There is no significant mediastinal lymphadenopathy.   LUNG, PLEURA, AND LARGE AIRWAYS: There is a large right pleural effusion.   There is consolidation and loss of volume of the right lower lobe.   CHEST WALL AND LOWER NECK: Thyroid as visualized is unremarkable   BONES: There are degenerative changes of the spine.   UPPER ABDOMEN: There are liver lesions. There are splenic lesions.   COMPARISON TO THE PRIOR EXAM: The splenic lesions were not obvious previously. The pleural effusion was not obvious previously.       Large right pleural effusion. Right lower lobe consolidation and loss of volume.   Splenic hypodensities.   Heterogeneity of the liver is suggested.   Signed by: Chyna Nguyen 10/25/2024 3:59 PM Dictation workstation:   [id number]    ECG 12 lead    Result Date: 10/25/2024  Normal sinus rhythm Cannot rule out Anterior infarct , age undetermined Abnormal ECG When compared with ECG of 27-DEC-2018 12:58, No significant change was found    XR chest 2 views    Result Date: 10/25/2024  Interpreted By:  Anthony Davis, STUDY: XR CHEST 2 VIEWS; 10/25/2024 1:19 pm   INDICATION: Signs/Symptoms:Dyspnea   COMPARISON: December 2021.   ACCESSION NUMBER(S): [id number]   ORDERING  CLINICIAN: MARINA KING   TECHNIQUE: Number of films: Two-view radiographs of the chest were obtained.   FINDINGS: The heart and mediastinum are normal. There is interval development of large right pleural effusion with right basilar infiltrates/atelectatic changes. The left lung is clear. There is no pneumothorax. The osseous structures are unchanged; reversed arthroplasty hardware is again noted in the right shoulder.       Interval development of large right effusion with right basilar infiltrates/atelectatic changes. This may be due to pneumonia, however malignant effusion can not be excluded. Correlate clinically and follow-up as needed. In findings not resolve within 1-2 weeks, further evaluation could be obtained with contrast-enhanced CT scan. Alternatively thoracentesis could also be considered.   Signed by: Anthony Davis 10/25/2024 1:54 PM Dictation workstation:   [id number]   Current Facility-Administered Medications   Medication Dose Route Frequency Provider Last Rate Last Admin    atorvastatin (Lipitor) tablet 40 mg  40 mg oral Nightly Alana Kwan MD   40 mg at 10/26/24 2155    cetirizine (ZyrTEC) tablet 10 mg  10 mg oral Daily Alana Kwan MD   10 mg at 10/27/24 0843    cholecalciferol (Vitamin D-3) tablet 2,000 Units  2,000 Units oral Daily Alana Kwan MD   2,000 Units at 10/27/24 0650    DULoxetine (Cymbalta) DR capsule 30 mg  30 mg oral Daily Alana Kawn MD   30 mg at 10/27/24 0843    famotidine (Pepcid) tablet 20 mg  20 mg oral Nightly Alana Kwan MD   20 mg at 10/26/24 2155    furosemide (Lasix) tablet 20 mg  20 mg oral Daily Alana Kwan MD   20 mg at 10/27/24 0843    heparin (porcine) injection 5,000 Units  5,000 Units subcutaneous q12h Alana Kwan MD   5,000 Units at 10/27/24 0650    mirtazapine (Remeron) tablet 7.5 mg  7.5 mg oral Nightly Alana Kwan MD   7.5 mg at 10/26/24 2155    polyethylene glycol (Glycolax, Miralax) packet 17  g  17 g oral Daily Alana Kwan MD        [Held by provider] spironolactone (Aldactone) tablet 25 mg  25 mg oral Daily Alana Kwan MD   25 mg at 10/26/24 0913     No current outpatient medications on file.   ..     Medication and Non-Pharmacologic VTE Prophylaxis/Anticoagulants      Last Anticoag Admin            heparin (porcine) injection 5,000 Units    Given 5,000 Units at 0650    Frequency: Every 12 hours         There are additional administrations since 10/24/24 0949 that are not shown.    No unadministered anticoagulant orders found.          Assessment/Plan   Kajal R [place] has  Principal Problem:    Pleural effusion ?malignant      Will consult IR for thoracentesis    Other Hospital problems        Abnormal findings not addressed during hospitalization, but require out patient follow up. None        I spent 35 minutes talking and examining Kajal R [place], reviewing the labs & medications, formulating plan of care & discussing with patient and nursing staff.    Alana Kwan MD

## 2024-10-28 ENCOUNTER — APPOINTMENT (OUTPATIENT)
Dept: CARDIOLOGY | Facility: HOSPITAL | Age: 89
DRG: 844 | End: 2024-10-28
Payer: MEDICARE

## 2024-10-28 ENCOUNTER — APPOINTMENT (OUTPATIENT)
Dept: RADIOLOGY | Facility: HOSPITAL | Age: 89
DRG: 844 | End: 2024-10-28
Payer: MEDICARE

## 2024-10-28 LAB
ALBUMIN FLD-MCNC: 3 G/DL
AMYLASE FLD-CCNC: 105 U/L
ANION GAP SERPL CALC-SCNC: 14 MMOL/L (ref 10–20)
AORTIC VALVE MEAN GRADIENT: 10.2 MMHG
AORTIC VALVE PEAK VELOCITY: 2.33 M/S
APTT PPP: 30 SECONDS (ref 27–38)
AV PEAK GRADIENT: 21.7 MMHG
BASOPHILS # BLD AUTO: 0.01 X10*3/UL (ref 0–0.1)
BASOPHILS NFR BLD AUTO: 0.2 %
BASOPHILS NFR FLD MANUAL: 0 %
BLASTS NFR FLD MANUAL: 0 %
BUN SERPL-MCNC: 51 MG/DL (ref 6–23)
CALCIUM SERPL-MCNC: 9.7 MG/DL (ref 8.6–10.3)
CHLORIDE SERPL-SCNC: 100 MMOL/L (ref 98–107)
CHOLEST FLD-MCNC: 99 MG/DL
CLARITY FLD: ABNORMAL
CO2 SERPL-SCNC: 23 MMOL/L (ref 21–32)
COLOR FLD: ABNORMAL
CREAT SERPL-MCNC: 1.98 MG/DL (ref 0.5–1.05)
EGFRCR SERPLBLD CKD-EPI 2021: 24 ML/MIN/1.73M*2
EJECTION FRACTION APICAL 4 CHAMBER: 28.4
EJECTION FRACTION: 68 %
EOSINOPHIL # BLD AUTO: 0.13 X10*3/UL (ref 0–0.4)
EOSINOPHIL NFR BLD AUTO: 2.8 %
EOSINOPHIL NFR FLD MANUAL: 0 %
ERYTHROCYTE [DISTWIDTH] IN BLOOD BY AUTOMATED COUNT: 13.8 % (ref 11.5–14.5)
GLUCOSE SERPL-MCNC: 96 MG/DL (ref 74–99)
HCT VFR BLD AUTO: 33.3 % (ref 36–46)
HGB BLD-MCNC: 10.4 G/DL (ref 12–16)
HOLD SPECIMEN: NORMAL
IMM GRANULOCYTES # BLD AUTO: 0.03 X10*3/UL (ref 0–0.5)
IMM GRANULOCYTES NFR BLD AUTO: 0.7 % (ref 0–0.9)
IMMATURE GRANULOCYTES IN FLUID: 0 %
INR PPP: 1 (ref 0.9–1.1)
LDH FLD L TO P-CCNC: 335 U/L
LEFT VENTRICLE INTERNAL DIMENSION DIASTOLE: 2.9 CM (ref 3.5–6)
LEGIONELLA AG UR QL: NEGATIVE
LYMPHOCYTES # BLD AUTO: 0.27 X10*3/UL (ref 0.8–3)
LYMPHOCYTES NFR BLD AUTO: 5.9 %
LYMPHOCYTES NFR FLD MANUAL: 50 %
MCH RBC QN AUTO: 29.7 PG (ref 26–34)
MCHC RBC AUTO-ENTMCNC: 31.2 G/DL (ref 32–36)
MCV RBC AUTO: 95 FL (ref 80–100)
MITRAL VALVE E/A RATIO: 0.54
MONOCYTES # BLD AUTO: 0.54 X10*3/UL (ref 0.05–0.8)
MONOCYTES NFR BLD AUTO: 11.7 %
MONOS+MACROS NFR FLD MANUAL: 41 %
NEUTROPHILS # BLD AUTO: 3.63 X10*3/UL (ref 1.6–5.5)
NEUTROPHILS NFR BLD AUTO: 78.7 %
NEUTROPHILS NFR FLD MANUAL: 10 %
NRBC BLD-RTO: 0 /100 WBCS (ref 0–0)
OTHER CELLS NFR FLD MANUAL: 0 %
PLASMA CELLS NFR FLD MANUAL: 0 %
PLATELET # BLD AUTO: 202 X10*3/UL (ref 150–450)
POTASSIUM SERPL-SCNC: 5.2 MMOL/L (ref 3.5–5.3)
PROT FLD-MCNC: 5.2 G/DL
PROTHROMBIN TIME: 11.2 SECONDS (ref 9.8–12.8)
RBC # BLD AUTO: 3.5 X10*6/UL (ref 4–5.2)
RBC # FLD AUTO: 7000 /UL
RIGHT VENTRICLE PEAK SYSTOLIC PRESSURE: 34.9 MMHG
S PNEUM AG UR QL: NEGATIVE
SODIUM SERPL-SCNC: 132 MMOL/L (ref 136–145)
TOTAL CELLS COUNTED FLD: 200
WBC # BLD AUTO: 4.6 X10*3/UL (ref 4.4–11.3)
WBC # FLD AUTO: 852 /UL

## 2024-10-28 PROCEDURE — 1100000001 HC PRIVATE ROOM DAILY

## 2024-10-28 PROCEDURE — 71045 X-RAY EXAM CHEST 1 VIEW: CPT

## 2024-10-28 PROCEDURE — 82042 OTHER SOURCE ALBUMIN QUAN EA: CPT | Mod: AHULAB | Performed by: INTERNAL MEDICINE

## 2024-10-28 PROCEDURE — 83615 LACTATE (LD) (LDH) ENZYME: CPT | Mod: AHULAB | Performed by: INTERNAL MEDICINE

## 2024-10-28 PROCEDURE — 87116 MYCOBACTERIA CULTURE: CPT | Mod: AHULAB | Performed by: INTERNAL MEDICINE

## 2024-10-28 PROCEDURE — 99232 SBSQ HOSP IP/OBS MODERATE 35: CPT | Performed by: STUDENT IN AN ORGANIZED HEALTH CARE EDUCATION/TRAINING PROGRAM

## 2024-10-28 PROCEDURE — 93306 TTE W/DOPPLER COMPLETE: CPT | Performed by: INTERNAL MEDICINE

## 2024-10-28 PROCEDURE — 85025 COMPLETE CBC W/AUTO DIFF WBC: CPT | Performed by: INTERNAL MEDICINE

## 2024-10-28 PROCEDURE — 88112 CYTOPATH CELL ENHANCE TECH: CPT | Mod: TC,AHULAB | Performed by: INTERNAL MEDICINE

## 2024-10-28 PROCEDURE — 82465 ASSAY BLD/SERUM CHOLESTEROL: CPT | Mod: AHULAB | Performed by: INTERNAL MEDICINE

## 2024-10-28 PROCEDURE — 2500000004 HC RX 250 GENERAL PHARMACY W/ HCPCS (ALT 636 FOR OP/ED)

## 2024-10-28 PROCEDURE — 85610 PROTHROMBIN TIME: CPT | Performed by: INTERNAL MEDICINE

## 2024-10-28 PROCEDURE — 2720000007 HC OR 272 NO HCPCS

## 2024-10-28 PROCEDURE — 83615 LACTATE (LD) (LDH) ENZYME: CPT | Performed by: INTERNAL MEDICINE

## 2024-10-28 PROCEDURE — 87070 CULTURE OTHR SPECIMN AEROBIC: CPT | Mod: AHULAB | Performed by: INTERNAL MEDICINE

## 2024-10-28 PROCEDURE — 2500000004 HC RX 250 GENERAL PHARMACY W/ HCPCS (ALT 636 FOR OP/ED): Performed by: INTERNAL MEDICINE

## 2024-10-28 PROCEDURE — 93306 TTE W/DOPPLER COMPLETE: CPT

## 2024-10-28 PROCEDURE — 0W993ZZ DRAINAGE OF RIGHT PLEURAL CAVITY, PERCUTANEOUS APPROACH: ICD-10-PCS

## 2024-10-28 PROCEDURE — 71045 X-RAY EXAM CHEST 1 VIEW: CPT | Performed by: RADIOLOGY

## 2024-10-28 PROCEDURE — 36415 COLL VENOUS BLD VENIPUNCTURE: CPT | Performed by: INTERNAL MEDICINE

## 2024-10-28 PROCEDURE — 82150 ASSAY OF AMYLASE: CPT | Mod: AHULAB | Performed by: INTERNAL MEDICINE

## 2024-10-28 PROCEDURE — C1729 CATH, DRAINAGE: HCPCS

## 2024-10-28 PROCEDURE — 32555 ASPIRATE PLEURA W/ IMAGING: CPT

## 2024-10-28 PROCEDURE — 84157 ASSAY OF PROTEIN OTHER: CPT | Mod: AHULAB | Performed by: INTERNAL MEDICINE

## 2024-10-28 PROCEDURE — 80048 BASIC METABOLIC PNL TOTAL CA: CPT | Performed by: INTERNAL MEDICINE

## 2024-10-28 PROCEDURE — 89051 BODY FLUID CELL COUNT: CPT | Performed by: INTERNAL MEDICINE

## 2024-10-28 PROCEDURE — 85730 THROMBOPLASTIN TIME PARTIAL: CPT | Performed by: INTERNAL MEDICINE

## 2024-10-28 PROCEDURE — 2500000001 HC RX 250 WO HCPCS SELF ADMINISTERED DRUGS (ALT 637 FOR MEDICARE OP): Performed by: INTERNAL MEDICINE

## 2024-10-28 RX ORDER — LIDOCAINE HYDROCHLORIDE 10 MG/ML
INJECTION, SOLUTION EPIDURAL; INFILTRATION; INTRACAUDAL; PERINEURAL
Status: COMPLETED | OUTPATIENT
Start: 2024-10-28 | End: 2024-10-28

## 2024-10-28 RX ADMIN — CETIRIZINE HYDROCHLORIDE 10 MG: 10 TABLET, FILM COATED ORAL at 11:00

## 2024-10-28 RX ADMIN — BENZOCAINE AND MENTHOL 1 LOZENGE: 15; 3.6 LOZENGE ORAL at 11:59

## 2024-10-28 RX ADMIN — FAMOTIDINE 20 MG: 20 TABLET, FILM COATED ORAL at 21:18

## 2024-10-28 RX ADMIN — LIDOCAINE HYDROCHLORIDE 10 ML: 10 INJECTION, SOLUTION EPIDURAL; INFILTRATION; INTRACAUDAL; PERINEURAL at 10:00

## 2024-10-28 RX ADMIN — MIRTAZAPINE 7.5 MG: 15 TABLET, FILM COATED ORAL at 21:18

## 2024-10-28 RX ADMIN — HEPARIN SODIUM 5000 UNITS: 5000 INJECTION INTRAVENOUS; SUBCUTANEOUS at 06:16

## 2024-10-28 RX ADMIN — Medication 2000 UNITS: at 06:16

## 2024-10-28 RX ADMIN — HEPARIN SODIUM 5000 UNITS: 5000 INJECTION INTRAVENOUS; SUBCUTANEOUS at 18:39

## 2024-10-28 RX ADMIN — ATORVASTATIN CALCIUM 40 MG: 40 TABLET, FILM COATED ORAL at 21:18

## 2024-10-28 RX ADMIN — FUROSEMIDE 20 MG: 20 TABLET ORAL at 11:00

## 2024-10-28 RX ADMIN — DULOXETINE HYDROCHLORIDE 30 MG: 30 CAPSULE, DELAYED RELEASE ORAL at 11:00

## 2024-10-28 ASSESSMENT — ACTIVITIES OF DAILY LIVING (ADL): LACK_OF_TRANSPORTATION: NO

## 2024-10-28 ASSESSMENT — COGNITIVE AND FUNCTIONAL STATUS - GENERAL
DAILY ACTIVITIY SCORE: 23
WALKING IN HOSPITAL ROOM: A LITTLE
MOBILITY SCORE: 20
MOVING TO AND FROM BED TO CHAIR: A LITTLE
CLIMB 3 TO 5 STEPS WITH RAILING: A LITTLE
DRESSING REGULAR LOWER BODY CLOTHING: A LITTLE
STANDING UP FROM CHAIR USING ARMS: A LITTLE

## 2024-10-28 ASSESSMENT — PAIN - FUNCTIONAL ASSESSMENT
PAIN_FUNCTIONAL_ASSESSMENT: 0-10
PAIN_FUNCTIONAL_ASSESSMENT: 0-10

## 2024-10-28 ASSESSMENT — PAIN SCALES - GENERAL
PAINLEVEL_OUTOF10: 0 - NO PAIN

## 2024-10-28 NOTE — PROGRESS NOTES
"[facility] [place] [facility]          Admitting Provider: Alana Kwan MD Admission Date: 10/25/2024.   Attending Provider: Alana Kwan MD MRN: [id number]       Dell Null is a 89 y.o. female on day 3 of admission presenting with Pleural effusion.  Interval History no complaints.     Objective   Physical Exam  Last Recorded Vitals: Blood pressure (!) 156/91, pulse 79, temperature 36.6 °C (97.8 °F), temperature source Temporal, resp. rate 18, height 1.499 m (4' 11\"), weight 48.5 kg (107 lb), SpO2 92%.  Patient Vitals for the past 24 hrs:   BP Temp Temp src Pulse Resp SpO2   10/28/24 0745 (!) 156/91 36.6 °C (97.8 °F) Temporal 79 18 92 %   10/27/24 2323 125/77 36.7 °C (98 °F) Temporal 72 16 93 %   10/27/24 2007 163/69 36.3 °C (97.4 °F) Oral 72 14 95 %   10/27/24 1602 122/71 36.3 °C (97.4 °F) -- 71 16 95 %     Body mass index is 21.61 kg/m².  GENERAL: alert, cooperative, or no distress  SKIN: no rashes  HEENT Atraumatic, Normocephalic and Not pale, no icterus  NECK: supple, no thyromegaly, JVP within normal limits  LUNGS:  in respiratory distress, respiratory rate normal, clear to auscultation right sided dullness.  CARDIAC: regular rate and rhythm, normal S1 and S2, no murmur, rub, or gallop  ABDOMEN: Soft, non-tender, normal bowel sounds; no bruits, organomegaly or masses.  EXTREMITIES: No edema  NEURO: Alert and oriented x 3 , gait normal ., reflexes normal and symmetric, strength and  sensation grossly normal  Intake/Output last 3 Shifts:  I/O last 3 completed shifts:  In: 300 (6.2 mL/kg) [P.O.:300]  Out: - (0 mL/kg)   Weight: 48.5 kg   DATA:   Diagnostic tests reviewed for today's visit:    Most recent Labs  Results for orders placed or performed during the hospital encounter of 10/25/24 (from the past 24 hours)   CBC and Auto Differential   Result Value Ref Range    WBC 4.6 4.4 - 11.3 x10*3/uL    nRBC 0.0 0.0 - 0.0 /100 WBCs    RBC 3.50 (L) 4.00 - 5.20 x10*6/uL    Hemoglobin " "10.4 (L) 12.0 - 16.0 g/dL    Hematocrit 33.3 (L) 36.0 - 46.0 %    MCV 95 80 - 100 fL    MCH 29.7 26.0 - 34.0 pg    MCHC 31.2 (L) 32.0 - 36.0 g/dL    RDW 13.8 11.5 - 14.5 %    Platelets 202 150 - 450 x10*3/uL    Neutrophils % 78.7 40.0 - 80.0 %    Immature Granulocytes %, Automated 0.7 0.0 - 0.9 %    Lymphocytes % 5.9 13.0 - 44.0 %    Monocytes % 11.7 2.0 - 10.0 %    Eosinophils % 2.8 0.0 - 6.0 %    Basophils % 0.2 0.0 - 2.0 %    Neutrophils Absolute 3.63 1.60 - 5.50 x10*3/uL    Immature Granulocytes Absolute, Automated 0.03 0.00 - 0.50 x10*3/uL    Lymphocytes Absolute 0.27 (L) 0.80 - 3.00 x10*3/uL    Monocytes Absolute 0.54 0.05 - 0.80 x10*3/uL    Eosinophils Absolute 0.13 0.00 - 0.40 x10*3/uL    Basophils Absolute 0.01 0.00 - 0.10 x10*3/uL   Basic Metabolic Panel   Result Value Ref Range    Glucose 96 74 - 99 mg/dL    Sodium 132 (L) 136 - 145 mmol/L    Potassium 5.2 3.5 - 5.3 mmol/L    Chloride 100 98 - 107 mmol/L    Bicarbonate 23 21 - 32 mmol/L    Anion Gap 14 10 - 20 mmol/L    Urea Nitrogen 51 (H) 6 - 23 mg/dL    Creatinine 1.98 (H) 0.50 - 1.05 mg/dL    eGFR 24 (L) >60 mL/min/1.73m*2    Calcium 9.7 8.6 - 10.3 mg/dL   Protime-INR   Result Value Ref Range    Protime 11.2 9.8 - 12.8 seconds    INR 1.0 0.9 - 1.1   APTT   Result Value Ref Range    aPTT 30 27 - 38 seconds   SST TOP   Result Value Ref Range    Extra Tube Hold for add-ons.    Light Blue Top   Result Value Ref Range    Extra Tube Hold for add-ons.      Urine Culture   Date Value Ref Range Status   10/07/2024 No significant growth  Final    No results found for: \"RESPCULTSM\" No results found for: \"PERDIAFLDCUL\" No results found for: \"STERFLDCULSM\"   No results found.  Current Facility-Administered Medications   Medication Dose Route Frequency Provider Last Rate Last Admin    atorvastatin (Lipitor) tablet 40 mg  40 mg oral Nightly Alana Kwan MD   40 mg at 10/27/24 2041    cetirizine (ZyrTEC) tablet 10 mg  10 mg oral Daily Alana Kwan MD   " 10 mg at 10/27/24 0843    cholecalciferol (Vitamin D-3) tablet 2,000 Units  2,000 Units oral Daily Alana Kwan MD   2,000 Units at 10/28/24 0616    DULoxetine (Cymbalta) DR capsule 30 mg  30 mg oral Daily Alana Kwan MD   30 mg at 10/27/24 0843    famotidine (Pepcid) tablet 20 mg  20 mg oral Nightly Alana Kwan MD   20 mg at 10/27/24 2041    furosemide (Lasix) tablet 20 mg  20 mg oral Daily Alana Kwan MD   20 mg at 10/27/24 0843    heparin (porcine) injection 5,000 Units  5,000 Units subcutaneous q12h Alana Kwan MD   5,000 Units at 10/28/24 0616    mirtazapine (Remeron) tablet 7.5 mg  7.5 mg oral Nightly Alana Kwan MD   7.5 mg at 10/27/24 2041    polyethylene glycol (Glycolax, Miralax) packet 17 g  17 g oral Daily Alana Kwan MD   17 g at 10/27/24 1346    [Held by provider] spironolactone (Aldactone) tablet 25 mg  25 mg oral Daily Alana Kwan MD   25 mg at 10/26/24 0913     No current outpatient medications on file.   ..     Medication and Non-Pharmacologic VTE Prophylaxis/Anticoagulants      Last Anticoag Admin            heparin (porcine) injection 5,000 Units    Given 5,000 Units at 0616    Frequency: Every 12 hours         There are additional administrations since 10/25/24 0842 that are not shown.    No unadministered anticoagulant orders found.          Assessment/Plan Kajal R Bogart has  Principal Problem:    Pleural effusion ?malignant     CKD IV   Will consult IR for thoracentesis. Plan for it today. send fluid for cytology etc   Other Hospital problems        Abnormal findings not addressed during hospitalization, but require out patient follow up. None        I spent 35 minutes talking and examining Kajal R Bogart, reviewing the labs & medications, formulating plan of care & discussing with patient and nursing staff.      Alana Kwan MD

## 2024-10-28 NOTE — PROGRESS NOTES
10/28/24 1013   Discharge Planning   Living Arrangements Alone   Support Systems Children   Assistance Needed Independent prior to admission to hospital   Type of Residence Private residence   Number of Stairs to Enter Residence 0   Number of Stairs Within Residence 12   Who is requesting discharge planning? Provider   Home or Post Acute Services None   Expected Discharge Disposition Home   Financial Resource Strain   How hard is it for you to pay for the very basics like food, housing, medical care, and heating? Not hard   Housing Stability   In the last 12 months, was there a time when you were not able to pay the mortgage or rent on time? N   In the past 12 months, how many times have you moved where you were living? 0   At any time in the past 12 months, were you homeless or living in a shelter (including now)? N   Transportation Needs   In the past 12 months, has lack of transportation kept you from medical appointments or from getting medications? no   In the past 12 months, has lack of transportation kept you from meetings, work, or from getting things needed for daily living? No     Patient admitted for a pleural effusion.  Met with patient and her son at the bedside to discuss discharge plan.  She plans to return home and not sure about homecare at this time.  PLAN/BARRIER: SOB, thoracentesis and cytology   DISP: home  ADOD: 1-2 days  Denae Simental RN

## 2024-10-28 NOTE — POST-PROCEDURE NOTE
Interventional Radiology Brief Postprocedure Note    Attending: Ran Mitchell CNP    Assistant: none    Diagnosis: right sided pleural effusion    Description of procedure: Ultrasound guided thoracentesis was preformed on the right.  1,300 mL of jc fluid was drained.        Anesthesia:  Local    Complications: None    Estimated Blood Loss: none    Medications  As of 10/28/24 1015      cefTRIAXone (Rocephin) 1 g in dextrose (iso) IV 50 mL (mL/hr) Total dose:  1 g* Dosing weight:  48.5   *From user-documented volume     Date/Time Rate/Dose/Volume Action       10/25/24  1758 1 g - 100 mL/hr (over 30 min) New Bag      1900 50 mL Stopped               azithromycin 500 mg in dextrose 5% 250 mL IV (mL/hr) Total dose:  500 mg* Dosing weight:  48.5   *From user-documented volume     Date/Time Rate/Dose/Volume Action       10/25/24  2137 500 mg - 250 mL/hr (over 60 min) New Bag      2237 250 mL Stopped               atorvastatin (Lipitor) tablet 40 mg (mg) Total dose:  120 mg      Date/Time Rate/Dose/Volume Action       10/25/24  2136 40 mg Given     10/26/24  2155 40 mg Given     10/27/24  2041 40 mg Given               cetirizine (ZyrTEC) tablet 10 mg (mg) Total dose:  20 mg Dosing weight:  48.5      Date/Time Rate/Dose/Volume Action       10/26/24  0913 10 mg Given     10/27/24  0843 10 mg Given               cholecalciferol (Vitamin D-3) tablet 2,000 Units (Units) Total dose:  6,000 Units      Date/Time Rate/Dose/Volume Action       10/26/24  0645 2,000 Units Given     10/27/24  0650 2,000 Units Given     10/28/24  0616 2,000 Units Given               DULoxetine (Cymbalta) DR capsule 30 mg (mg) Total dose:  90 mg Dosing weight:  48.5      Date/Time Rate/Dose/Volume Action       10/25/24  2136 30 mg Given     10/26/24  2155 30 mg Given     10/27/24  0843 30 mg Given               famotidine (Pepcid) tablet 20 mg (mg) Total dose:  60 mg Dosing weight:  48.5      Date/Time Rate/Dose/Volume Action       10/25/24  2136 20  mg Given     10/26/24  2155 20 mg Given     10/27/24  2041 20 mg Given               furosemide (Lasix) tablet 20 mg (mg) Total dose:  40 mg Dosing weight:  48.5      Date/Time Rate/Dose/Volume Action       10/26/24  0913 20 mg Given     10/27/24  0843 20 mg Given               mirtazapine (Remeron) tablet 7.5 mg (mg) Total dose:  22.5 mg Dosing weight:  48.5      Date/Time Rate/Dose/Volume Action       10/25/24  2136 7.5 mg Given     10/26/24  2155 7.5 mg Given     10/27/24  2041 7.5 mg Given               spironolactone (Aldactone) tablet 25 mg (mg) Total dose:  25 mg* Dosing weight:  48.5   *Administration not included in total     Date/Time Rate/Dose/Volume Action       10/26/24  0913 25 mg Given      1224 *Not included in total Held by provider     10/27/24  0900 *25 mg Missed     10/28/24  0900 *Not included in total Automatically Held               polyethylene glycol (Glycolax, Miralax) packet 17 g (g) Total dose:  17 g* Dosing weight:  48.5   *Administration not included in total     Date/Time Rate/Dose/Volume Action       10/25/24  2139 *17 g Missed     10/26/24  0927 *17 g Missed     10/27/24  0843 *17 g Missed      1346 17 g Given               heparin (porcine) injection 5,000 Units (Units) Total dose:  30,000 Units Dosing weight:  48.5      Date/Time Rate/Dose/Volume Action       10/25/24  2137 5,000 Units Given     10/26/24  0923 5,000 Units Given      1805 5,000 Units Given     10/27/24  0650 5,000 Units Given      1824 5,000 Units Given     10/28/24  0616 5,000 Units Given               lidocaine PF (Xylocaine) 10 mg/mL (1 %) injection (mL) Total volume:  10 mL      Date/Time Rate/Dose/Volume Action       10/28/24  1000 10 mL Given                   Specimens collected      See detailed result report with images in PACS.    The patient tolerated the procedure well without incident or complication and is in stable condition.

## 2024-10-28 NOTE — CARE PLAN
The patient's goals for the shift include      The clinical goals for the shift include maintain safety    Problem: Pain - Adult  Goal: Verbalizes/displays adequate comfort level or baseline comfort level  Outcome: Progressing     Problem: Safety - Adult  Goal: Free from fall injury  Outcome: Progressing     Problem: Discharge Planning  Goal: Discharge to home or other facility with appropriate resources  Outcome: Progressing     Problem: Chronic Conditions and Co-morbidities  Goal: Patient's chronic conditions and co-morbidity symptoms are monitored and maintained or improved  Outcome: Progressing

## 2024-10-28 NOTE — PROGRESS NOTES
"Kajal Null is a 89 y.o. female on day 3 of admission presenting with Pleural effusion.    Subjective   Patient seen at bedside post thoracentesis. She reports some post thora right sided pleuritic pain (CXR negative for PNX) and a mild irritable cough. SOB is improving though she is still mildly SOB going to the bathroom. No sputum production, fever, chills or hemoptysis. ROS otherwise negative.       Objective     Physical Exam  Constitutional:       General: She is not in acute distress.     Appearance: Normal appearance. She is not toxic-appearing.   HENT:      Head: Normocephalic and atraumatic.      Nose: No rhinorrhea.      Mouth/Throat:      Mouth: Mucous membranes are moist.   Eyes:      Extraocular Movements: Extraocular movements intact.      Conjunctiva/sclera: Conjunctivae normal.      Pupils: Pupils are equal, round, and reactive to light.   Cardiovascular:      Rate and Rhythm: Normal rate and regular rhythm.      Heart sounds: No murmur heard.     No friction rub. No gallop.   Pulmonary:      Comments: Good air entry left lung and RUL, diminished breath sounds RLL  Abdominal:      General: There is no distension.   Musculoskeletal:      Cervical back: Normal range of motion.      Right lower leg: No edema.      Left lower leg: No edema.   Skin:     General: Skin is warm and dry.   Neurological:      General: No focal deficit present.      Mental Status: She is alert and oriented to person, place, and time.   Psychiatric:         Mood and Affect: Mood normal.         Behavior: Behavior normal.         Thought Content: Thought content normal.         Judgment: Judgment normal.         Last Recorded Vitals  Blood pressure 121/83, pulse 72, temperature 36.6 °C (97.8 °F), temperature source Temporal, resp. rate 18, height 1.499 m (4' 11\"), weight 48.5 kg (107 lb), SpO2 94%.  Intake/Output last 3 Shifts:  I/O last 3 completed shifts:  In: 300 (6.2 mL/kg) [P.O.:300]  Out: - (0 mL/kg)   Weight: 48.5 kg "     Relevant Results                 Results for orders placed or performed during the hospital encounter of 10/25/24 (from the past 24 hours)   CBC and Auto Differential   Result Value Ref Range    WBC 4.6 4.4 - 11.3 x10*3/uL    nRBC 0.0 0.0 - 0.0 /100 WBCs    RBC 3.50 (L) 4.00 - 5.20 x10*6/uL    Hemoglobin 10.4 (L) 12.0 - 16.0 g/dL    Hematocrit 33.3 (L) 36.0 - 46.0 %    MCV 95 80 - 100 fL    MCH 29.7 26.0 - 34.0 pg    MCHC 31.2 (L) 32.0 - 36.0 g/dL    RDW 13.8 11.5 - 14.5 %    Platelets 202 150 - 450 x10*3/uL    Neutrophils % 78.7 40.0 - 80.0 %    Immature Granulocytes %, Automated 0.7 0.0 - 0.9 %    Lymphocytes % 5.9 13.0 - 44.0 %    Monocytes % 11.7 2.0 - 10.0 %    Eosinophils % 2.8 0.0 - 6.0 %    Basophils % 0.2 0.0 - 2.0 %    Neutrophils Absolute 3.63 1.60 - 5.50 x10*3/uL    Immature Granulocytes Absolute, Automated 0.03 0.00 - 0.50 x10*3/uL    Lymphocytes Absolute 0.27 (L) 0.80 - 3.00 x10*3/uL    Monocytes Absolute 0.54 0.05 - 0.80 x10*3/uL    Eosinophils Absolute 0.13 0.00 - 0.40 x10*3/uL    Basophils Absolute 0.01 0.00 - 0.10 x10*3/uL   Basic Metabolic Panel   Result Value Ref Range    Glucose 96 74 - 99 mg/dL    Sodium 132 (L) 136 - 145 mmol/L    Potassium 5.2 3.5 - 5.3 mmol/L    Chloride 100 98 - 107 mmol/L    Bicarbonate 23 21 - 32 mmol/L    Anion Gap 14 10 - 20 mmol/L    Urea Nitrogen 51 (H) 6 - 23 mg/dL    Creatinine 1.98 (H) 0.50 - 1.05 mg/dL    eGFR 24 (L) >60 mL/min/1.73m*2    Calcium 9.7 8.6 - 10.3 mg/dL   Protime-INR   Result Value Ref Range    Protime 11.2 9.8 - 12.8 seconds    INR 1.0 0.9 - 1.1   APTT   Result Value Ref Range    aPTT 30 27 - 38 seconds   SST TOP   Result Value Ref Range    Extra Tube Hold for add-ons.    Light Blue Top   Result Value Ref Range    Extra Tube Hold for add-ons.    Sterile Fluid Culture/Smear    Specimen: Pleural; Fluid   Result Value Ref Range    Gram Stain (2+) Few Polymorphonuclear leukocytes     Gram Stain No organisms seen    AFB Processed   Result Value Ref  Range    Extra Tube Hold for add-ons.    Albumin, Fluid   Result Value Ref Range    Albumin, Fluid 3.0 Not established g/dL   Amylase, Fluid   Result Value Ref Range    Amylase, Fluid 105 Not established. U/L   Body Fluid Cell Count   Result Value Ref Range    Color, Fluid Orange (A) Colorless, Straw, Yellow    Clarity, Fluid Cloudy (A) Clear    WBC, Fluid 852 See Comment /uL    RBC, Fluid 7,000 0  /uL /uL   Body Fluid Differential   Result Value Ref Range    Neutrophils %, Manual, Fluid 10 <25 % %    Lymphocytes %, Manual, Fluid 50 <75 % %    Mono/Macrophages %, Manual, Fluid 41 <70 % %    Eosinophils %, Manual, Fluid 0 0 % %    Basophils %, Manual, Fluid 0 0 % %    Immature Granulocytes %, Manual, Fluid 0 0 % %    Blasts %, Manual, Fluid 0 0 % %    Unclassified Cells %, Manual, Fluid 0 0 % %    Plasma Cells %, Manual, Fluid 0 0 % %    Total Cells Counted, Fluid 200    Lactate Dehydrogenase, Fluid   Result Value Ref Range    LD, Fluid 335 Not established. U/L   Protein, Total Fluid   Result Value Ref Range    Protein, Total Fluid 5.2 Not established g/dL   Cholesterol, Body Fluid   Result Value Ref Range    Cholesterol, Fluid 99 Not established mg/dL                  Assessment/Plan   Assessment & Plan  Pleural effusion    Ms. Null is an 89 YOF with h/o asthma, allergies, lung cancer of the RLL s/p radiation with R pleural effusion on imaging sicne 3/2024, throat cancer, CKD III, anemia, GERD, who p/w worsening SOB x 3 days. Associated with weight loss over last few months. In the ED work up revealed large R sided pleural effusion. Admitted to [facility] for further management. Pulmonary is consulted for pleural effusion.      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  -Follow up pleural fluid cytology/cultures  -Monitor for reaccumulation  -May need additional intervention if fluid reaccumulates  -OP PET scheduled for next week at [facility]     Asthma: does not seem to be in acute exacerbation. On  albuterol at home.        Albuterol as needed       Might need PFT as outpatient     SOB: likely due to above. HFpEF is also possible contributor, however unilateral exudative pleural effusion would go against this diagnosis      DVT prophylaxis: subcutaneous heparin.        I spent 35 minutes in the professional and overall care of this patient.      Deena Wilkinson, DO

## 2024-10-28 NOTE — CARE PLAN
The patient's goals for the shift include      The clinical goals for the shift include maintain safety      Problem: Pain - Adult  Goal: Verbalizes/displays adequate comfort level or baseline comfort level  Outcome: Progressing     Problem: Safety - Adult  Goal: Free from fall injury  Outcome: Progressing

## 2024-10-29 ENCOUNTER — APPOINTMENT (OUTPATIENT)
Dept: RADIOLOGY | Facility: HOSPITAL | Age: 89
DRG: 844 | End: 2024-10-29
Payer: MEDICARE

## 2024-10-29 LAB — LDH SERPL L TO P-CCNC: 206 U/L (ref 84–246)

## 2024-10-29 PROCEDURE — 1100000001 HC PRIVATE ROOM DAILY

## 2024-10-29 PROCEDURE — 2500000004 HC RX 250 GENERAL PHARMACY W/ HCPCS (ALT 636 FOR OP/ED): Performed by: NURSE PRACTITIONER

## 2024-10-29 PROCEDURE — 71045 X-RAY EXAM CHEST 1 VIEW: CPT | Performed by: RADIOLOGY

## 2024-10-29 PROCEDURE — 32555 ASPIRATE PLEURA W/ IMAGING: CPT

## 2024-10-29 PROCEDURE — 99232 SBSQ HOSP IP/OBS MODERATE 35: CPT | Performed by: STUDENT IN AN ORGANIZED HEALTH CARE EDUCATION/TRAINING PROGRAM

## 2024-10-29 PROCEDURE — 71045 X-RAY EXAM CHEST 1 VIEW: CPT

## 2024-10-29 PROCEDURE — 0W993ZZ DRAINAGE OF RIGHT PLEURAL CAVITY, PERCUTANEOUS APPROACH: ICD-10-PCS | Performed by: NURSE PRACTITIONER

## 2024-10-29 PROCEDURE — 2500000001 HC RX 250 WO HCPCS SELF ADMINISTERED DRUGS (ALT 637 FOR MEDICARE OP): Performed by: INTERNAL MEDICINE

## 2024-10-29 PROCEDURE — 2500000004 HC RX 250 GENERAL PHARMACY W/ HCPCS (ALT 636 FOR OP/ED): Performed by: INTERNAL MEDICINE

## 2024-10-29 RX ORDER — LIDOCAINE HYDROCHLORIDE 10 MG/ML
INJECTION, SOLUTION EPIDURAL; INFILTRATION; INTRACAUDAL; PERINEURAL
Status: COMPLETED | OUTPATIENT
Start: 2024-10-29 | End: 2024-10-29

## 2024-10-29 RX ADMIN — ATORVASTATIN CALCIUM 40 MG: 40 TABLET, FILM COATED ORAL at 20:28

## 2024-10-29 RX ADMIN — FUROSEMIDE 20 MG: 20 TABLET ORAL at 08:09

## 2024-10-29 RX ADMIN — HEPARIN SODIUM 5000 UNITS: 5000 INJECTION INTRAVENOUS; SUBCUTANEOUS at 17:47

## 2024-10-29 RX ADMIN — FAMOTIDINE 20 MG: 20 TABLET, FILM COATED ORAL at 20:27

## 2024-10-29 RX ADMIN — LIDOCAINE HYDROCHLORIDE 10 ML: 10 INJECTION, SOLUTION EPIDURAL; INFILTRATION; INTRACAUDAL; PERINEURAL at 11:58

## 2024-10-29 RX ADMIN — Medication 2000 UNITS: at 05:38

## 2024-10-29 RX ADMIN — HEPARIN SODIUM 5000 UNITS: 5000 INJECTION INTRAVENOUS; SUBCUTANEOUS at 05:41

## 2024-10-29 RX ADMIN — CETIRIZINE HYDROCHLORIDE 10 MG: 10 TABLET, FILM COATED ORAL at 08:09

## 2024-10-29 RX ADMIN — MIRTAZAPINE 7.5 MG: 15 TABLET, FILM COATED ORAL at 20:28

## 2024-10-29 ASSESSMENT — COGNITIVE AND FUNCTIONAL STATUS - GENERAL
WALKING IN HOSPITAL ROOM: A LITTLE
DRESSING REGULAR UPPER BODY CLOTHING: A LITTLE
MOVING TO AND FROM BED TO CHAIR: A LITTLE
DAILY ACTIVITIY SCORE: 23
MOVING TO AND FROM BED TO CHAIR: A LITTLE
TURNING FROM BACK TO SIDE WHILE IN FLAT BAD: A LITTLE
WALKING IN HOSPITAL ROOM: A LITTLE
DRESSING REGULAR LOWER BODY CLOTHING: A LITTLE
MOBILITY SCORE: 18
DRESSING REGULAR LOWER BODY CLOTHING: A LITTLE
CLIMB 3 TO 5 STEPS WITH RAILING: A LITTLE
MOBILITY SCORE: 18
STANDING UP FROM CHAIR USING ARMS: A LITTLE
DAILY ACTIVITIY SCORE: 19
WALKING IN HOSPITAL ROOM: A LITTLE
TOILETING: A LITTLE
HELP NEEDED FOR BATHING: A LITTLE
STANDING UP FROM CHAIR USING ARMS: A LITTLE
HELP NEEDED FOR BATHING: A LITTLE
DRESSING REGULAR UPPER BODY CLOTHING: A LITTLE
MOVING TO AND FROM BED TO CHAIR: A LITTLE
MOBILITY SCORE: 18
MOVING FROM LYING ON BACK TO SITTING ON SIDE OF FLAT BED WITH BEDRAILS: A LITTLE
TURNING FROM BACK TO SIDE WHILE IN FLAT BAD: A LITTLE
MOVING FROM LYING ON BACK TO SITTING ON SIDE OF FLAT BED WITH BEDRAILS: A LITTLE
PERSONAL GROOMING: A LITTLE
TURNING FROM BACK TO SIDE WHILE IN FLAT BAD: A LITTLE
PERSONAL GROOMING: A LITTLE
MOVING FROM LYING ON BACK TO SITTING ON SIDE OF FLAT BED WITH BEDRAILS: A LITTLE
STANDING UP FROM CHAIR USING ARMS: A LITTLE
CLIMB 3 TO 5 STEPS WITH RAILING: A LITTLE
DRESSING REGULAR LOWER BODY CLOTHING: A LITTLE
TOILETING: A LITTLE
CLIMB 3 TO 5 STEPS WITH RAILING: A LITTLE
DAILY ACTIVITIY SCORE: 19

## 2024-10-29 ASSESSMENT — PAIN SCALES - GENERAL
PAINLEVEL_OUTOF10: 0 - NO PAIN

## 2024-10-29 ASSESSMENT — PAIN - FUNCTIONAL ASSESSMENT
PAIN_FUNCTIONAL_ASSESSMENT: 0-10

## 2024-10-29 NOTE — PROGRESS NOTES
Kajal Null is a 89 y.o. female on day 4 of admission presenting with Pleural effusion.    Subjective   Patient seen at bedside post second thoracentesis. She reports some post thora right sided pleuritic pain which is improved from yesterday. She has not walked since her second thora but did walk yesterday and fatigued very quickly. She is voicing concern about going home. She denies cough, SOB or LE edema. She does report severe low back pain which has worsened since her coccyx fracture in 5/2024 as well as bilateral LE numbness, however she reports her numbness is chronic over the course of years.        Objective     Physical Exam  Constitutional:       General: She is not in acute distress.     Appearance: Normal appearance. She is not toxic-appearing.   HENT:      Head: Normocephalic and atraumatic.      Nose: No rhinorrhea.      Mouth/Throat:      Mouth: Mucous membranes are moist.   Eyes:      Extraocular Movements: Extraocular movements intact.      Conjunctiva/sclera: Conjunctivae normal.      Pupils: Pupils are equal, round, and reactive to light.   Cardiovascular:      Rate and Rhythm: Normal rate and regular rhythm.      Heart sounds: No murmur heard.     No friction rub. No gallop.   Pulmonary:      Effort: Pulmonary effort is normal.      Breath sounds: Normal breath sounds. No wheezing, rhonchi or rales.      Comments: Improved air entry RLL  Abdominal:      General: There is no distension.   Musculoskeletal:      Cervical back: Normal range of motion.      Right lower leg: No edema.      Left lower leg: No edema.   Skin:     General: Skin is warm and dry.   Neurological:      General: No focal deficit present.      Mental Status: She is alert and oriented to person, place, and time.   Psychiatric:         Mood and Affect: Mood normal.         Behavior: Behavior normal.         Thought Content: Thought content normal.         Judgment: Judgment normal.         Last Recorded Vitals  Blood pressure  "116/63, pulse 75, temperature 37.3 °C (99.1 °F), temperature source Temporal, resp. rate 18, height 1.499 m (4' 11\"), weight 48.5 kg (107 lb), SpO2 94%.  Intake/Output last 3 Shifts:  No intake/output data recorded.    Relevant Results                 Results for orders placed or performed during the hospital encounter of 10/25/24 (from the past 24 hours)   Transthoracic Echo (TTE) Complete   Result Value Ref Range    AV pk nate 2.33 m/s    AV mn grad 10.2 mmHg    MV E/A ratio 0.54     LV EF 68 %    LVIDd 2.90 cm    RVSP 34.9 mmHg    AV pk grad 21.7 mmHg    LV A4C EF 28.4                   Assessment/Plan   Assessment & Plan  Pleural effusion    Ms. Null is an 89 YOF with h/o asthma, allergies, lung cancer of the RLL s/p radiation with R pleural effusion on imaging sicne 3/2024, throat cancer, CKD III, anemia, GERD, who p/w worsening SOB x 3 days. Associated with weight loss over last few months. In the ED work up revealed large R sided pleural effusion. Admitted to [facility] for further management. Pulmonary is consulted for pleural effusion.      Pleural effusion: exudative, lymphocyte predominant. Prelim path concerning for malignancy.   -Follow up pleural fluid cytology  -Will put in outpatient pulm referral to monitor effusion. Prefers to follow at  for pulm and med onc. She has a PET scheduled for next week at [facility] which will show reaccumulation. If rapidly reaccumulating, may require PleurX  -Follows with ENT and rad onc at [facility]. Discussed with patient, her son and her daughter in law who all prefer to follow for med onc here at . Recommend med onc referral. Discussed with primary team.     2. Low back pain  -Given likely metastatic malignancy given prelim positive cytology, would recommend MRI whole spine which can be done inpatient or urgently outpatient, discussed with primary team     3. Asthma: does not seem to be in acute exacerbation. On albuterol at home.        Albuterol as needed       Might need PFT as " outpatient     4. SOB: likely due to above. HFpEF is also possible contributor, however unilateral exudative pleural effusion would go against this diagnosis. Pt euvolemic on exam     5. DVT prophylaxis: subcutaneous heparin.        I spent 35 minutes in the professional and overall care of this patient.      Deena Wilkinson,

## 2024-10-29 NOTE — PROGRESS NOTES
"[facility]          Admitting Provider: Alana Kwan MD Admission Date: 10/25/2024.   Attending Provider: Alana Kwan MD MRN: [id number]       Dell Null is a 89 y.o. female on day 4 of admission presenting with Pleural effusion.  Interval History had thoracentesis of 1300 ml. No complaints.     Objective   Physical Exam  Last Recorded Vitals: Blood pressure 111/74, pulse 79, temperature 37.3 °C (99.1 °F), temperature source Temporal, resp. rate 18, height 1.499 m (4' 11\"), weight 48.5 kg (107 lb), SpO2 93%.  Patient Vitals for the past 24 hrs:   BP Temp Temp src Pulse Resp SpO2   10/29/24 0757 111/74 37.3 °C (99.1 °F) Temporal 79 18 93 %   10/29/24 0400 112/68 36.7 °C (98 °F) Oral 81 16 92 %   10/28/24 2100 112/69 36.5 °C (97.7 °F) Oral 103 -- 94 %   10/28/24 1529 130/74 36.3 °C (97.4 °F) Temporal 73 18 93 %   10/28/24 1017 121/83 -- -- 72 18 94 %   10/28/24 1011 130/73 -- -- 74 18 97 %   10/28/24 1005 106/87 -- -- 66 18 94 %   10/28/24 1000 126/65 -- -- 81 18 95 %   10/28/24 0946 122/68 -- -- 75 18 92 %     Body mass index is 21.61 kg/m².  GENERAL: alert, cooperative, or no distress  SKIN: no rashes  HEENT Atraumatic, Normocephalic and Not pale, no icterus  NECK: supple, no thyromegaly, JVP within normal limits  LUNGS:  not in respiratory distress, respiratory rate normal  CARDIAC: rate regular and regular rhythm,  ABDOMEN: Soft, non-tender.  EXTREMITIES: No edema  NEURO: Alert and oriented x 3,   Intake/Output last 3 Shifts:  No intake/output data recorded.  DATA:   Diagnostic tests reviewed for today's visit:    Most recent  labs and imaging results  Results for orders placed or performed during the hospital encounter of 10/25/24 (from the past 24 hours)   Sterile Fluid Culture/Smear    Specimen: Pleural; Fluid   Result Value Ref Range    Sterile Fluid Culture/Smear No growth to date     Gram Stain (2+) Few Polymorphonuclear leukocytes     Gram Stain No " "organisms seen    AFB Processed   Result Value Ref Range    Extra Tube Hold for add-ons.    Albumin, Fluid   Result Value Ref Range    Albumin, Fluid 3.0 Not established g/dL   Amylase, Fluid   Result Value Ref Range    Amylase, Fluid 105 Not established. U/L   Body Fluid Cell Count   Result Value Ref Range    Color, Fluid Orange (A) Colorless, Straw, Yellow    Clarity, Fluid Cloudy (A) Clear    WBC, Fluid 852 See Comment /uL    RBC, Fluid 7,000 0  /uL /uL   Body Fluid Differential   Result Value Ref Range    Neutrophils %, Manual, Fluid 10 <25 % %    Lymphocytes %, Manual, Fluid 50 <75 % %    Mono/Macrophages %, Manual, Fluid 41 <70 % %    Eosinophils %, Manual, Fluid 0 0 % %    Basophils %, Manual, Fluid 0 0 % %    Immature Granulocytes %, Manual, Fluid 0 0 % %    Blasts %, Manual, Fluid 0 0 % %    Unclassified Cells %, Manual, Fluid 0 0 % %    Plasma Cells %, Manual, Fluid 0 0 % %    Total Cells Counted, Fluid 200    Lactate Dehydrogenase, Fluid   Result Value Ref Range    LD, Fluid 335 Not established. U/L   Protein, Total Fluid   Result Value Ref Range    Protein, Total Fluid 5.2 Not established g/dL   Cholesterol, Body Fluid   Result Value Ref Range    Cholesterol, Fluid 99 Not established mg/dL   Transthoracic Echo (TTE) Complete   Result Value Ref Range    AV pk nate 2.33 m/s    AV mn grad 10.2 mmHg    MV E/A ratio 0.54     LV EF 68 %    LVIDd 2.90 cm    RVSP 34.9 mmHg    AV pk grad 21.7 mmHg    LV A4C EF 28.4      *Note: Due to a large number of results and/or encounters for the requested time period, some results have not been displayed. A complete set of results can be found in Results Review.     Urine Culture   Date Value Ref Range Status   10/07/2024 No significant growth  Final    No results found for: \"RESPCULTSM\" No results found for: \"PERDIAFLDCUL\"   Sterile Fluid Culture/Smear   Date Value Ref Range Status   10/28/2024 No growth to date  Preliminary      Transthoracic Echo (TTE) Complete    Result " Date: 10/28/2024   [facility], [address] [place] [address]              Tel [phone] and Fax [phone] TRANSTHORACIC ECHOCARDIOGRAM REPORT  Patient Name:      SULAIMAN SUH        Reading Physician:    [id number] Jax Nicole MD Study Date:        10/28/2024           Ordering Provider:    [id number] JIM SESAY MRN/PID:           [id number]             Fellow: Accession#:        [id number]         Nurse: Date of Birth/Age: 1935 / 89 years Sonographer:          Sreedhar Davison LAWSON Gender:            F                    Additional Staff: Height:            150.00 cm            Admit Date: Weight:            48.50 kg             Admission Status:     Inpatient -                                                               Routine BSA / BMI:         1.41 m2 / 21.56      Encounter#:           [id number]                    kg/m2 Blood Pressure:    121/83 mmHg          Department Location:  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 Study Detail: The following Echo studies were performed: 2D, M-Mode, Doppler and               color flow.  PHYSICIAN INTERPRETATION: Left Ventricle: Left ventricular ejection fraction is normal, by visual estimate at 65-70%. There are no regional left ventricular wall motion abnormalities. The left ventricular cavity size was not assessed. Spectral Doppler shows an impaired relaxation pattern of left ventricular diastolic filling. Left Atrium: The left atrium was not assessed. Increased thickness of  the atrial septum (sparing the fossa ovalis) is present. This is consistent with lipomatous hypertrophy of the atrial septum. Right Ventricle: The right ventricle was not assessed. There is normal right ventricular global systolic function. Right Atrium: The right atrium was not assessed. Aortic Valve: The aortic valve was not well visualized. The aortic valve dimensionless index is 1.12. There is mild aortic valve regurgitation. The peak instantaneous gradient of the aortic valve is 21.7 mmHg. The mean gradient of the aortic valve is 10.2 mmHg. Mitral Valve: The mitral valve is normal in structure. There is severe mitral annular calcification. There is mild mitral valve regurgitation. Tricuspid Valve: The tricuspid valve is structurally normal. There is mild tricuspid regurgitation. The Doppler estimated RVSP is mildly elevated at 34.9 mmHg. Pulmonic Valve: The pulmonic valve is not well visualized. Pulmonic valve regurgitation was not assessed. Pericardium: There is no pericardial effusion noted. Aorta: The aortic root was not assessed. Systemic Veins: The inferior vena cava appears small in size. In comparison to the previous echocardiogram(s): Compared with study dated 6/7/2024, there is a greater degree of aortic regrugitation now evident (present previously but not reported).  CONCLUSIONS:  1. Left ventricular ejection fraction is normal, by visual estimate at 65-70%.  2. Spectral Doppler shows an impaired relaxation pattern of left ventricular diastolic filling.  3. There is normal right ventricular global systolic function.  4. There is severe mitral annular calcification.  5. Mildly elevated right ventricular systolic pressure.  6. Mild aortic valve regurgitation. QUANTITATIVE DATA SUMMARY:  2D MEASUREMENTS:          Normal Ranges: IVSd:            0.92 cm  (0.6-1.1cm) LVPWd:           1.07 cm  (0.6-1.1cm) LVIDd:           2.90 cm  (3.9-5.9cm) LVIDs:           1.81 cm LV Mass Index:   55 g/m2 LVEDV Index:      39 ml/m2 LV % FS          37.8 %  M-MODE MEASUREMENTS:         Normal Ranges: LAs:                 2.22 cm (2.7-4.0cm)  LV SYSTOLIC FUNCTION BY 2D PLANIMETRY (MOD):                      Normal Ranges: EF-A4C View:    28 % (>=55%) EF-A2C View:    27 % EF-Biplane:     27 % EF-Visual:      68 % LV EF Reported: 68 %  LV DIASTOLIC FUNCTION:          Normal Ranges: MV Peak E:             0.69 m/s (0.7-1.2 m/s) MV Peak A:             1.28 m/s (0.42-0.7 m/s) E/A Ratio:             0.54     (1.0-2.2)  MITRAL VALVE:          Normal Ranges: MV Vmax:      1.48 m/s (<=1.3m/s) MV peak P.7 mmHg (<5mmHg) MV mean P.4 mmHg (<2mmHg) MV VTI:       31.32 cm (10-13cm) MV DT:        371 msec (150-240msec)  AORTIC VALVE:                      Normal Ranges: AoV Vmax:                2.33 m/s  (<=1.7m/s) AoV Peak P.7 mmHg (<20mmHg) AoV Mean PG:             10.2 mmHg (1.7-11.5mmHg) LVOT Max Mario:            2.35 m/s  (<=1.1m/s) AoV VTI:                 32.23 cm  (18-25cm) LVOT VTI:                36.21 cm AoV Dimensionless Index: 1.12  TRICUSPID VALVE/RVSP:          Normal Ranges: Peak TR Velocity:     2.82 m/s RV Syst Pressure:     35 mmHg  (< 30mmHg)  [id number] Jax Nicole MD Electronically signed on 10/28/2024 at 7:13:20 PM  ** Final **     US thoracentesis    Result Date: 10/28/2024  Interpreted By:  Ran Mitchell, STUDY: US [id number]:[id number] pm   INDICATION: Signs/Symptoms:pleural effusion   COMPARISON: CT chest 10/25/2024   ACCESSION NUMBER(S): [id number]   ORDERING CLINICIAN: CM AKINS   TECHNIQUE: INTERVENTIONALIST(S): Ran Mitchell   CONSENT: The patient/patient's POA/next of kin was informed of the nature of the proposed procedure. The purposes, alternatives, risks, and benefits were explained and discussed. All questions were answered and consent was obtained.   SEDATION: None   MEDICATION/CONTRAST: 1% lidocaine was used to anesthetize subcutaneously.   TIME OUT: A time out was  performed immediately prior to procedure start with the interventional team, correctly identifying the patient name, date of birth, MRN, procedure, anatomy (including marking of site and side), patient position, procedure consent form, relevant laboratory and imaging test results, antibiotic administration, safety precautions, and procedure-specific equipment needs.   FINDINGS: The patient was placed in the sitting position.   The pleural space was examined with grey scale ultrasound, and the most accessible fluid identified and marked for thoracentesis.   The skin was prepped and draped in usual manner. Local anesthesia with lidocaine was administered and a right-sided thoracentesis was performed.  A 5 [place] One-Step Valved thoracentesis needle/catheter was then placed where marked. Approximately 1,300 mL of Evelyne colored fluid was removed.  The needle/catheter was then withdrawn.   The patient tolerated the procedure well and there were no immediate complications. Specimen(s) sent to the laboratory for further evaluation, per the requesting team.       Uneventful thoracentesis, as detailed above. Right pleural space, 1,300 mL   I personally performed and/or directly supervised this study and was present for the entire procedure.   Performed and dictated at [facility].   Signed by: Noé Mitchell 10/28/2024 5:28 PM Dictation workstation:   [id number]    XR chest 1 view    Result Date: 10/28/2024  Interpreted By:  Chyna Nguyen, STUDY: XR CHEST 1 VIEW;  10/28/2024 10:19 am   INDICATION: Signs/Symptoms:s/p right thora.   COMPARISON: 10/25/2024; CT chest dated 10/25/2024   ACCESSION NUMBER(S): [id number]   ORDERING CLINICIAN: NOÉ MITCHELL   FINDINGS: Heart is normal in size. There is a suggestion of calcification of the mitral annulus.   There is a right pleural effusion. There is what may resent loculation of fluid versus a pleural-based mass on the right.   There is no obvious  pneumothorax.   The patient status post right shoulder replacement.   COMPARISON OF FINDING: The right pleural effusion appears smaller.       Right pleural fluid.   MACRO: none   Signed by: Chyna Nguyen 10/28/2024 11:43 AM Dictation workstation:   [id number]   Current Facility-Administered Medications   Medication Dose Route Frequency Provider Last Rate Last Admin    atorvastatin (Lipitor) tablet 40 mg  40 mg oral Nightly Alana Kwan MD   40 mg at 10/28/24 2118    benzocaine-menthol (Cepastat Sore Throat) lozenge 1 lozenge  1 lozenge Mouth/Throat q2h PRN Alana Kwan MD   1 lozenge at 10/28/24 1159    cetirizine (ZyrTEC) tablet 10 mg  10 mg oral Daily Alana Kwan MD   10 mg at 10/29/24 0809    cholecalciferol (Vitamin D-3) tablet 2,000 Units  2,000 Units oral Daily Alana Kwan MD   2,000 Units at 10/29/24 0538    DULoxetine (Cymbalta) DR capsule 30 mg  30 mg oral Daily Alana Kwan MD   30 mg at 10/28/24 1100    famotidine (Pepcid) tablet 20 mg  20 mg oral Nightly Alana Kwan MD   20 mg at 10/28/24 2118    furosemide (Lasix) tablet 20 mg  20 mg oral Daily Alana Kwan MD   20 mg at 10/29/24 0809    heparin (porcine) injection 5,000 Units  5,000 Units subcutaneous q12h Alana Kwan MD   5,000 Units at 10/29/24 0541    mirtazapine (Remeron) tablet 7.5 mg  7.5 mg oral Nightly Alana Kwan MD   7.5 mg at 10/28/24 2118    polyethylene glycol (Glycolax, Miralax) packet 17 g  17 g oral Daily Alana Kwan MD   17 g at 10/27/24 1346    [Held by provider] spironolactone (Aldactone) tablet 25 mg  25 mg oral Daily Alana Kwan MD   25 mg at 10/26/24 0913     No current outpatient medications on file.   ..     Medication and Non-Pharmacologic VTE Prophylaxis/Anticoagulants      Last Anticoag Admin            heparin (porcine) injection 5,000 Units    Given 5,000 Units at 0541    Frequency: Every 12 hours         There are additional  administrations since 10/26/24 0905 that are not shown.    No unadministered anticoagulant orders found.          Assessment/Plan   Kajal R Olin has  Principal Problem:    Pleural effusion ?malignant     CKD IV   S/P Thoracentesis   Other Hospital problems        Abnormal findings not addressed during hospitalization, but require out patient follow up. None        I spent 35 minutes talking and examining Kajal R Olin, reviewing the labs & medications, formulating plan of care & discussing with patient and nursing staff.    Alana Kwan MD

## 2024-10-29 NOTE — CARE PLAN
The patient's goals for the shift include      The clinical goals for the shift include pt safety to be maintained    Problem: Pain - Adult  Goal: Verbalizes/displays adequate comfort level or baseline comfort level  Outcome: Progressing     Problem: Safety - Adult  Goal: Free from fall injury  Outcome: Progressing     Problem: Discharge Planning  Goal: Discharge to home or other facility with appropriate resources  Outcome: Progressing     Problem: Chronic Conditions and Co-morbidities  Goal: Patient's chronic conditions and co-morbidity symptoms are monitored and maintained or improved  Outcome: Progressing

## 2024-10-29 NOTE — CARE PLAN
The patient's goals for the shift includes to remain safe and comfortable    The clinical goals for the shift include Patient will maintain HDS and be free of falls throughout this entire shift    Over the shift, the patient did not make progress toward the following goals. Barriers to progression include. Recommendations to address these barriers include.

## 2024-10-29 NOTE — POST-PROCEDURE NOTE
Interventional Radiology Brief Postprocedure Note    Attending: Lizbeth Escamilla CNP    Assistant: none    Diagnosis: Chronic pleural effusion    Description of procedure: Ultrasound guided thoracentesis was preformed on the right.  550mL of  cloudy  jc fluid was drained.      Anesthesia:  Local    Complications: None    Estimated Blood Loss: none    Specimens: No    See detailed result report with images in PACS.    The patient tolerated the procedure well without incident or complication and is in stable condition.

## 2024-10-30 LAB
ACID FAST STN SPEC: NORMAL
MYCOBACTERIUM SPEC CULT: NORMAL

## 2024-10-30 PROCEDURE — 1100000001 HC PRIVATE ROOM DAILY

## 2024-10-30 PROCEDURE — 97165 OT EVAL LOW COMPLEX 30 MIN: CPT | Mod: GO

## 2024-10-30 PROCEDURE — 2500000004 HC RX 250 GENERAL PHARMACY W/ HCPCS (ALT 636 FOR OP/ED): Performed by: INTERNAL MEDICINE

## 2024-10-30 PROCEDURE — 99232 SBSQ HOSP IP/OBS MODERATE 35: CPT | Performed by: STUDENT IN AN ORGANIZED HEALTH CARE EDUCATION/TRAINING PROGRAM

## 2024-10-30 PROCEDURE — 2500000001 HC RX 250 WO HCPCS SELF ADMINISTERED DRUGS (ALT 637 FOR MEDICARE OP): Performed by: INTERNAL MEDICINE

## 2024-10-30 PROCEDURE — 97161 PT EVAL LOW COMPLEX 20 MIN: CPT | Mod: GP

## 2024-10-30 RX ADMIN — FUROSEMIDE 20 MG: 20 TABLET ORAL at 09:29

## 2024-10-30 RX ADMIN — MIRTAZAPINE 7.5 MG: 15 TABLET, FILM COATED ORAL at 21:21

## 2024-10-30 RX ADMIN — FAMOTIDINE 20 MG: 20 TABLET, FILM COATED ORAL at 21:21

## 2024-10-30 RX ADMIN — ATORVASTATIN CALCIUM 40 MG: 40 TABLET, FILM COATED ORAL at 21:21

## 2024-10-30 RX ADMIN — Medication 2000 UNITS: at 06:15

## 2024-10-30 RX ADMIN — HEPARIN SODIUM 5000 UNITS: 5000 INJECTION INTRAVENOUS; SUBCUTANEOUS at 06:15

## 2024-10-30 RX ADMIN — CETIRIZINE HYDROCHLORIDE 10 MG: 10 TABLET, FILM COATED ORAL at 09:29

## 2024-10-30 RX ADMIN — POLYETHYLENE GLYCOL 3350 17 G: 17 POWDER, FOR SOLUTION ORAL at 09:29

## 2024-10-30 RX ADMIN — HEPARIN SODIUM 5000 UNITS: 5000 INJECTION INTRAVENOUS; SUBCUTANEOUS at 17:43

## 2024-10-30 ASSESSMENT — ACTIVITIES OF DAILY LIVING (ADL)
ADL_ASSISTANCE: INDEPENDENT
BATHING_ASSISTANCE: STAND BY
ADL_ASSISTANCE: INDEPENDENT

## 2024-10-30 ASSESSMENT — COGNITIVE AND FUNCTIONAL STATUS - GENERAL
MOVING FROM LYING ON BACK TO SITTING ON SIDE OF FLAT BED WITH BEDRAILS: A LITTLE
DRESSING REGULAR LOWER BODY CLOTHING: A LITTLE
CLIMB 3 TO 5 STEPS WITH RAILING: A LITTLE
STANDING UP FROM CHAIR USING ARMS: A LITTLE
DAILY ACTIVITIY SCORE: 19
HELP NEEDED FOR BATHING: A LITTLE
WALKING IN HOSPITAL ROOM: A LITTLE
CLIMB 3 TO 5 STEPS WITH RAILING: A LITTLE
PERSONAL GROOMING: A LITTLE
MOBILITY SCORE: 18
MOVING TO AND FROM BED TO CHAIR: A LITTLE
DRESSING REGULAR UPPER BODY CLOTHING: A LITTLE
DAILY ACTIVITIY SCORE: 19
HELP NEEDED FOR BATHING: A LITTLE
PERSONAL GROOMING: A LITTLE
DRESSING REGULAR UPPER BODY CLOTHING: A LITTLE
MOVING TO AND FROM BED TO CHAIR: A LITTLE
WALKING IN HOSPITAL ROOM: A LITTLE
TOILETING: A LITTLE
MOVING FROM LYING ON BACK TO SITTING ON SIDE OF FLAT BED WITH BEDRAILS: A LITTLE
TOILETING: A LITTLE
DRESSING REGULAR LOWER BODY CLOTHING: A LITTLE
TURNING FROM BACK TO SIDE WHILE IN FLAT BAD: A LITTLE
TURNING FROM BACK TO SIDE WHILE IN FLAT BAD: A LITTLE
STANDING UP FROM CHAIR USING ARMS: A LITTLE
MOBILITY SCORE: 18

## 2024-10-30 ASSESSMENT — PAIN SCALES - GENERAL
PAINLEVEL_OUTOF10: 0 - NO PAIN
PAINLEVEL_OUTOF10: 5 - MODERATE PAIN
PAINLEVEL_OUTOF10: 0 - NO PAIN

## 2024-10-30 ASSESSMENT — PAIN - FUNCTIONAL ASSESSMENT
PAIN_FUNCTIONAL_ASSESSMENT: 0-10

## 2024-10-30 NOTE — CARE PLAN
Problem: Pain - Adult  Goal: Verbalizes/displays adequate comfort level or baseline comfort level  Outcome: Progressing   The patient's goals for the shift include      The clinical goals for the shift include pt safety to be maintained

## 2024-10-30 NOTE — PROGRESS NOTES
"Kajal Null is a 89 y.o. female on day 5 of admission presenting with Pleural effusion.    Subjective   Patient seen at bedside, reports that her dyspnea and pleuritic chest pain has resolved. She was able to ambulate with PT/OT and states that she had some exertional fatigue but no dyspnea. Reports continued low back pain which is unchanged. ROS otherwise negative.        Objective     Physical Exam  Constitutional:       General: She is not in acute distress.     Appearance: Normal appearance. She is not toxic-appearing.   HENT:      Head: Normocephalic and atraumatic.      Nose: No rhinorrhea.      Mouth/Throat:      Mouth: Mucous membranes are moist.   Eyes:      Extraocular Movements: Extraocular movements intact.      Conjunctiva/sclera: Conjunctivae normal.      Pupils: Pupils are equal, round, and reactive to light.   Cardiovascular:      Rate and Rhythm: Normal rate and regular rhythm.      Heart sounds: No murmur heard.     No friction rub. No gallop.   Pulmonary:      Effort: Pulmonary effort is normal.      Breath sounds: Normal breath sounds. No wheezing, rhonchi or rales.      Comments: Improved air entry RLL  Abdominal:      General: There is no distension.   Musculoskeletal:      Cervical back: Normal range of motion.      Right lower leg: No edema.      Left lower leg: No edema.   Skin:     General: Skin is warm and dry.   Neurological:      General: No focal deficit present.      Mental Status: She is alert and oriented to person, place, and time.   Psychiatric:         Mood and Affect: Mood normal.         Behavior: Behavior normal.         Thought Content: Thought content normal.         Judgment: Judgment normal.         Last Recorded Vitals  Blood pressure 102/70, pulse 76, temperature 36.9 °C (98.4 °F), temperature source Temporal, resp. rate 16, height 1.499 m (4' 11\"), weight 48.5 kg (107 lb), SpO2 94%.  Intake/Output last 3 Shifts:  No intake/output data recorded.    Relevant " Results                 No results found for this or any previous visit (from the past 24 hours).                 Assessment/Plan   Assessment & Plan  Pleural effusion    Ms. Null is an 89 YOF with h/o asthma, allergies, lung cancer of the RLL s/p radiation with R pleural effusion on imaging sicne 3/2024, throat cancer, CKD III, anemia, GERD, who p/w worsening SOB x 3 days. Associated with weight loss over last few months. In the ED work up revealed large R sided pleural effusion. Admitted to [facility] for further management. Pulmonary is consulted for pleural effusion.      Pleural effusion: exudative, lymphocyte predominant. Prelim path concerning for malignancy.   -Follow up pleural fluid cytology, NGS  -Will put in outpatient pulm referral to monitor effusion. Prefers to follow at  for pulm and med onc, though has an appointment next Wednesday at [facility] for med onc 1 day after her PET. PET will show if there is reaccumulation, If rapidly reaccumulating, may require PleurX    2. Low back pain  -Given likely metastatic malignancy given prelim positive cytology, would recommend MRI whole spine which can be done inpatient or urgently outpatient, discussed with primary team     3. Asthma: does not seem to be in acute exacerbation. On albuterol at home.        Albuterol as needed       Might need PFT as outpatient         I spent 35 minutes in the professional and overall care of this patient.      Deena Wilkinson, DO

## 2024-10-30 NOTE — PROGRESS NOTES
Occupational Therapy    Evaluation    Patient Name: Kajal Null  MRN: [id number]  Department: [place] [id number]  Room: [id number]  Today's Date: 10/30/2024  Time Calculation  Start Time: 1055  Stop Time: 1107  Time Calculation (min): 12 min    Assessment  IP OT Assessment  OT Assessment:  (OT Eval complete, patient is doing well with transfers and ADLs. Low intensity therapy recommended after discharge.)  Prognosis: Good  Barriers to Discharge: None  Evaluation/Treatment Tolerance: Patient tolerated treatment well  Medical Staff Made Aware: Yes  End of Session Communication: Bedside nurse  End of Session Patient Position: Up in chair, Alarm on  Plan:  Treatment Interventions: ADL retraining, Functional transfer training, Endurance training, Patient/family training, Neuromuscular reeducation  OT Frequency: 3 times per week  OT Discharge Recommendations: Low intensity level of continued care  OT Recommended Transfer Status: Stand by assist  OT - OK to Discharge: Yes    Subjective   Current Problem:  1. Pleural effusion  Referral to Pulmonology      2. Heart failure with preserved ejection fraction, unspecified HF chronicity  Transthoracic Echo (TTE) Complete    Transthoracic Echo (TTE) Complete      3. Pulmonary hypertension (Multi)  Transthoracic Echo (TTE) Complete    Transthoracic Echo (TTE) Complete      4. Acute combined systolic (congestive) and diastolic (congestive) heart failure  Transthoracic Echo (TTE) Complete        General:  General  Reason for Referral:  (88 y/o female admitted with pleural effusions.)  Referred By:  (Dr. 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    Change in bowel habit 09/13/2016    Increased bowel frequency    Chronic kidney disease     Chronic kidney disease, stage 3 unspecified (Multi) 06/12/2022    Chronic kidney disease (CKD), stage III (moderate)    Clotting disorder (Multi)     Diarrhea, unspecified 10/09/2017    Diarrhea, unspecified type    Diverticulitis of large intestine without perforation or abscess without bleeding 10/11/2017    Diverticulitis of colon    Diverticulosis of large intestine without perforation or abscess without bleeding 10/09/2017    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 bleeding    Dysphonia 04/28/2016    Dysphonia    Eczema     Essential (primary) hypertension 01/26/2019    Benign essential hypertension    Gastro-esophageal reflux disease without esophagitis 02/02/2020    GERD (gastroesophageal reflux disease)    Heart murmur     Nonrheumatic aortic (valve) insufficiency 12/07/2015    Nonrheumatic aortic valve insufficiency    Other conditions influencing health status     Osteoarthritis    Other fecal abnormalities 07/28/2015    Loose stools    Other specified noninflammatory disorders of vagina 07/17/2020    Vaginal irritation    Other specified noninflammatory disorders of vagina 05/25/2022    Vaginal irritation    Other specified symptoms and signs involving the circulatory and respiratory systems 04/28/2016    Abnormal lung sounds    Personal history of diseases of the blood and blood-forming organs and certain disorders involving the immune mechanism     History of anemia    Personal history of other diseases of the circulatory system     History of hypertension    Personal history of other diseases of the circulatory system     History of mitral valve prolapse    Personal history of other diseases of the digestive system     History of esophageal reflux    Personal history of other diseases of the respiratory system 12/19/2013    Personal  history of asthma    Personal history of other endocrine, nutritional and metabolic disease 12/19/2013    History of hyperlipidemia    Primary osteoarthritis, unspecified shoulder 12/08/2014    Shoulder arthritis    Radiculopathy, lumbosacral region 05/07/2019    Lumbosacral radiculopathy at L4    Unilateral primary osteoarthritis, left knee 06/05/2017    Arthritis of left knee    Urinary tract infection     Varicella        Family/Caregiver Present: Yes  Caregiver Feedback:  (Son is present and receptive of therapy)  Prior to Session Communication: Bedside nurse  Patient Position Received:  (Patient in bathroom upon OT arrival)  General Comment:  (Patient doing well overall with transfers and ADLs.)  Precautions:  Medical Precautions: Fall precautions    Pain:  Pain Assessment  Pain Assessment: 0-10  0-10 (Numeric) Pain Score: 0 - No pain    Objective   Cognition:  Overall Cognitive Status: Within Functional Limits     Home Living:  Type of Home:  (1st Floor Condo with 0 steps to enter, bed/bath on 1st floor, walk in shower, raised toilet seat.)  Lives With: Alone  Home Adaptive Equipment:  (Cane, Wheeled walker, shower seat, grab bars.)   Prior Function:  Level of [place]: Independent with ADLs and functional transfers  Receives Help From:  (Son checks in on patient.)  ADL Assistance: Independent  Homemaking Assistance: Independent  Ambulatory Assistance: Independent (No assistive device)  Hand Dominance: Right  IADL History:  Homemaking Responsibilities: Yes  Meal Prep Responsibility: Primary  ADL:  Eating Assistance: Independent  Grooming Assistance: Stand by  Bathing Assistance: Stand by  UE Dressing Assistance: Stand by  LE Dressing Assistance: Stand by  Toileting Assistance with Device: Stand by  Functional Assistance: Stand by  Activity Tolerance:  Endurance: Endurance does not limit participation in activity  Activity Tolerance Comments:  (Good Activity Tolerance.)  Bed Mobility/Transfers: Bed  Mobility  Bed Mobility: Yes  Bed Mobility 1  Bed Mobility 1: Supine to sitting  Level of Assistance 1: Close supervision  Bed Mobility 2  Bed Mobility  2: Sitting to supine  Level of Assistance 2: Close supervision         Sitting Balance:  Static Sitting Balance  Static Sitting-Balance Support: Feet supported  Static Sitting-Level of Assistance: Independent  Dynamic Sitting Balance  Dynamic Sitting-Balance Support: Feet supported  Dynamic Sitting-Level of Assistance: Close supervision  Dynamic Sitting-Balance: Reaching for objects  Standing Balance:  Static Standing Balance  Static Standing-Balance Support: Bilateral upper extremity supported  Static Standing-Level of Assistance: Contact guard  Dynamic Standing Balance  Dynamic Standing-Balance Support: Bilateral upper extremity supported  Dynamic Standing-Level of Assistance: Contact guard  Dynamic Standing-Balance: Reaching for objects    IADL's:   Homemaking Responsibilities: Yes  Meal Prep Responsibility: Primary    Strength:  Strength Comments:  (B UEs- 4/5 Throughout)    Coordination:  Movements are Fluid and Coordinated: Yes   Hand Function:  Hand Function  Gross Grasp: Functional  Coordination: Functional    Outcome Measures: [facility] Daily Activity  Putting on and taking off regular lower body clothing: A little  Bathing (including washing, rinsing, drying): A little  Putting on and taking off regular upper body clothing: A little  Toileting, which includes using toilet, bedpan or urinal: A little  Taking care of personal grooming such as brushing teeth: A little  Eating Meals: None  Daily Activity - Total Score: 19    Goals:   Encounter Problems       Encounter Problems (Active)       ADLs       Patient will perform UB and LB bathing with independent level of assistance and raised toilet seat.       Start:  10/30/24    Expected End:  11/13/24            Patient with complete upper body dressing with independent level of assistance donning and doffing all UE  clothes with no adaptive equipment while edge of bed        Start:  10/30/24    Expected End:  11/13/24            Patient with complete lower body dressing with independent level of assistance donning and doffing all LE clothes  with no adaptive equipment while edge of bed        Start:  10/30/24    Expected End:  11/13/24            Patient will complete daily grooming tasks brushing teeth with independent level of assistance and PRN adaptive equipment while standing.       Start:  10/30/24    Expected End:  11/13/24            Patient will complete toileting including hygiene clothing management/hygiene with independent level of assistance and raised toilet seat.       Start:  10/30/24    Expected End:  11/13/24               BALANCE       Pt will maintain dynamic standing balance during ADL task with independent level of assistance in order to demonstrate decreased risk of falling and improved postural control.       Start:  10/30/24    Expected End:  11/13/24               TRANSFERS       Patient will perform bed mobility independent level of assistance and bed rails in order to improve safety and independence with mobility       Start:  10/30/24    Expected End:  11/13/24            Patient will complete functional transfer to all surfaces with front wheeled walker with modified independent level of assistance.       Start:  10/30/24    Expected End:  11/13/24

## 2024-10-30 NOTE — PROGRESS NOTES
Care Coordinator Note:    Plan: Patient s/p R thora X2. Plan for MRI spine and PET scan as outpatient. PT OT rec LOW. [facility] [place].     Status: inpatient  Payor: medicare  Disposition:lives alone. [facility] PTOT  Barrier:   ADOD: 1-2 days    Esperanza Sidhu [facility]      10/30/24 [id number]   Discharge Planning   Home or Post Acute Services In home services   Type of Home Care Services Home OT;Home PT   Expected Discharge Disposition Home H

## 2024-10-30 NOTE — PROGRESS NOTES
Physical Therapy    Physical Therapy Evaluation    Patient Name: Kajal Null  MRN: [id number]  Department: [place] [address]  Room: [address] [place] [address]  Today's Date: 10/30/2024   Time Calculation  Start Time: 1341  Stop Time: 1357  Time Calculation (min): 16 min    Assessment/Plan   PT Assessment  PT Assessment Results: Decreased range of motion, Decreased strength, Impaired balance, Decreased endurance, Decreased mobility, Pain  Rehab Prognosis: Good  End of Session Communication: Bedside nurse  Assessment Comment: PT Evaluation Completed. The patient presented with decreased balance, endurance, strength and increased pain, and fatigue. These impairments are negatively impacting her ability to perform at baseline functional level, in home environment. This patient would benefit from skilled therapy intervention to address limitations and progress towards the PT goals.  Anticipate low frequency PT needs at discharge  End of Session Patient Position: Up in chair, Alarm on  IP OR SWING BED PT PLAN  Inpatient or Swing Bed: Inpatient  PT Plan  Treatment/Interventions: Bed mobility, Transfer training, Gait training, Stair training, Balance training, Strengthening, Endurance training, Range of motion, Therapeutic exercise, Therapeutic activity, Home exercise program  PT Plan: Ongoing PT  PT Frequency: 3 times per week  PT Discharge Recommendations: Low intensity level of continued care  PT Recommended Transfer Status: Assist x1  PT - OK to Discharge: Yes (PT POC established.)    Subjective   General Visit Information:  General  Reason for Referral: 88 y/o female admitted with pleural effusions.  Referred By: Dr. 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    Change in bowel habit 09/13/2016    Increased bowel frequency    Chronic kidney disease     Chronic kidney disease, stage 3 unspecified (Multi) 06/12/2022    Chronic kidney disease (CKD), stage III (moderate)    Clotting disorder (Multi)     Diarrhea, unspecified 10/09/2017    Diarrhea, unspecified type    Diverticulitis of large intestine without perforation or abscess without bleeding 10/11/2017    Diverticulitis of colon    Diverticulosis of large intestine without perforation or abscess without bleeding 10/09/2017    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 bleeding    Dysphonia 04/28/2016    Dysphonia    Eczema     Essential (primary) hypertension 01/26/2019    Benign essential hypertension    Gastro-esophageal reflux disease without esophagitis 02/02/2020    GERD (gastroesophageal reflux disease)    Heart murmur     Nonrheumatic aortic (valve) insufficiency 12/07/2015    Nonrheumatic aortic valve insufficiency    Other conditions influencing health status     Osteoarthritis    Other fecal abnormalities 07/28/2015    Loose stools    Other specified noninflammatory disorders of vagina 07/17/2020    Vaginal irritation    Other specified noninflammatory disorders of vagina 05/25/2022    Vaginal irritation    Other specified symptoms and signs involving the circulatory and respiratory systems 04/28/2016    Abnormal lung sounds    Personal history of diseases of the blood and blood-forming organs and certain disorders involving the immune mechanism     History of anemia    Personal history of other diseases of the circulatory system     History of hypertension    Personal history of other diseases of the circulatory system     History of mitral valve prolapse    Personal history of other diseases of the digestive system     History of esophageal reflux    Personal history of other  diseases of the respiratory system 12/19/2013    Personal history of asthma    Personal history of other endocrine, nutritional and metabolic disease 12/19/2013    History of hyperlipidemia    Primary osteoarthritis, unspecified shoulder 12/08/2014    Shoulder arthritis    Radiculopathy, lumbosacral region 05/07/2019    Lumbosacral radiculopathy at L4    Unilateral primary osteoarthritis, left knee 06/05/2017    Arthritis of left knee    Urinary tract infection     Varicella        Prior to Session Communication: Bedside nurse  Patient Position Received: Up in chair, Alarm on  General Comment: Pt pleasant and agreeable to PT eval.  Home Living:  Home Living  Type of Home: Condo  Lives With: Alone  Home Adaptive Equipment: Walker rolling or standard, Cane  Home Layout: One level  Home Access: Stairs to enter without rails  Entrance Stairs-Number of Steps: 1  Bathroom Shower/Tub: Walk-in shower  Bathroom Toilet: Handicapped height  Prior Level of Function:  Prior Function Per Pt/Caregiver Report  Level of [place]: Independent with ADLs and functional transfers, Independent with homemaking with ambulation  Receives Help From:  (son checks in on patient)  ADL Assistance: Independent  Homemaking Assistance: Independent  Ambulatory Assistance: Independent (no AD)  Prior Function Comments: Denies any falls in the past 6 months.  Precautions:  Precautions  Medical Precautions: Fall precautions     Vital Signs (Past 2hrs)                 Objective   Pain:  Pain Assessment  Pain Assessment: 0-10  0-10 (Numeric) Pain Score: 5 - Moderate pain  Pain Location: Coccyx  Cognition:  Cognition  Overall Cognitive Status: Within Functional Limits  Orientation Level: Oriented X4    General Assessments:  Activity Tolerance  Endurance: Endurance does not limit participation in activity    Sensation  Light Touch: No apparent deficits         Postural Control  Postural Control: Within Functional Limits    Static Sitting Balance  Static  Sitting-Balance Support: Feet supported, Bilateral upper extremity supported  Static Sitting-Level of Assistance: Close supervision  Dynamic Sitting Balance  Dynamic Sitting-Balance Support: Feet supported, Bilateral upper extremity supported  Dynamic Sitting-Level of Assistance: Close supervision    Static Standing Balance  Static Standing-Balance Support: Bilateral upper extremity supported  Static Standing-Level of Assistance: Close supervision  Dynamic Standing Balance  Dynamic Standing-Balance Support: Bilateral upper extremity supported  Dynamic Standing-Level of Assistance: Close supervision  Functional Assessments:       Transfers  Transfer: Yes  Transfer 1  Technique 1: Sit to stand, Stand to sit  Transfer Device 1: Walker  Transfer Level of Assistance 1: Contact guard  Trials/Comments 1: Cues for hand placement.    Ambulation/Gait Training  Ambulation/Gait Training Performed: Yes  Ambulation/Gait Training 1  Surface 1: Level tile  Device 1: Rolling walker  Assistance 1: Contact guard  Comments/Distance (ft) 1: 140 ft. Pt ambulates with decreased lisandro and step length, first 40 ft with RW, last 100 ft with no AD. Pt demonstrates good stability both with and without walker       Extremity/Trunk Assessments:  RUE   RUE : Within Functional Limits  LUE   LUE: Within Functional Limits  RLE   RLE :  (Strength >3/5 based on observation of functional mobility. ROM WFL.)  LLE   LLE :  (Strength >3/5 based on observation of functional mobility. ROM WFL.)  Outcome Measures:  [facility] Basic Mobility  Turning from your back to your side while in a flat bed without using bedrails: A little  Moving from lying on your back to sitting on the side of a flat bed without using bedrails: A little  Moving to and from bed to chair (including a wheelchair): A little  Standing up from a chair using your arms (e.g. wheelchair or bedside chair): A little  To walk in hospital room: A little  Climbing 3-5 steps with railing: A  little  Basic Mobility - Total Score: 18    Encounter Problems       Encounter Problems (Active)       Balance       complete all mobility with normal balance while dual tasking, negotiating in a dynamic environment, carrying items, etc., with proactive and reactive static and dynamic standing and sitting tasks, with mod I and LRAD, >15 minutes.         Start:  10/30/24    Expected End:  11/13/24               Mobility       STG - Patient will ambulate 150 ft mod I using LRAD.        Start:  10/30/24    Expected End:  11/13/24            STG - Patient will ambulate up and down a curb/step mod I using LRAD.        Start:  10/30/24    Expected End:  11/13/24               PT Transfers       STG - Patient will perform bed mobility independently.        Start:  10/30/24    Expected End:  11/13/24            STG - Patient will transfer sit to and from stand mod I with LRAD.        Start:  10/30/24    Expected End:  11/13/24               Pain - Adult              Education Documentation  Body Mechanics, taught by Rhianna Moser, PT at 10/30/2024  2:31 PM.  Learner: Patient  Readiness: Acceptance  Method: Explanation  Response: Verbalizes Understanding    Mobility Training, taught by Rhianna Moser, PT at 10/30/2024  2:31 PM.  Learner: Patient  Readiness: Acceptance  Method: Explanation  Response: Verbalizes Understanding    Education Comments  No comments found.

## 2024-10-30 NOTE — PROGRESS NOTES
"               [facility] [place]          Admitting Provider: Alana Kwan MD Admission Date: 10/25/2024.   Attending Provider: Alana Kwan MD MRN: [id number]       Dell Null is a 89 y.o. female on day 5 of admission presenting with Pleural effusion.  Interval History breathing okay. MRI spine recommended y pulmonary. Patient has low back pain mainly in coccyx after after a fall 4-5 months ago. She does not have saddle anesthesia bowel or bladder disturbance. Using a walker here to walk around. Walks unassisted at home.       Objective   Physical Exam  Last Recorded Vitals: Blood pressure 100/65, pulse 74, temperature 36.7 °C (98 °F), temperature source Oral, resp. rate 16, height 1.499 m (4' 11\"), weight 48.5 kg (107 lb), SpO2 96%.  Patient Vitals for the past 24 hrs:   BP Temp Temp src Pulse Resp SpO2   10/29/24 2342 100/65 36.7 °C (98 °F) Oral 74 16 96 %   10/29/24 1525 100/72 36.2 °C (97.1 °F) Temporal 81 18 94 %   10/29/24 1212 116/63 -- -- 75 18 94 %   10/29/24 1200 104/51 -- -- 74 18 94 %   10/29/24 1158 103/57 -- -- 75 18 93 %   10/29/24 1151 115/69 -- -- 97 18 96 %     Body mass index is 21.61 kg/m².  GENERAL: alert, cooperative, or no distress  SKIN: no rashes  HEENT Atraumatic, Normocephalic and Not pale, no icterus  NECK: supple, no thyromegaly, JVP within normal limits  LUNGS:  not in respiratory distress, respiratory rate normal, clear to auscultation  CARDIAC: regular rate and rhythm, normal S1 and S2, no murmur, rub, or gallop  ABDOMEN: Soft, non-tender, normal bowel sounds; no bruits, organomegaly or masses.  EXTREMITIES: No edema  NEURO: Alert and oriented x 3 , gait normal ., reflexes normal and symmetric, strength and  sensation grossly normal  Intake/Output last 3 Shifts:  No intake/output data recorded.  DATA:   Diagnostic tests reviewed for today's visit:    Most recent  labs and imaging results  No results found for this or any previous visit (from the past " "24 hours).  Urine Culture   Date Value Ref Range Status   10/07/2024 No significant growth  Final    No results found for: \"RESPCULTSM\" No results found for: \"PERDIAFLDCUL\"   Sterile Fluid Culture/Smear   Date Value Ref Range Status   10/28/2024 No growth to date  Preliminary      US thoracentesis    Result Date: 10/29/2024  Interpreted By:  Lizbeth Escamilla, STUDY: US THORACENTESIS; 10/29/92793:11 pm   INDICATION: Signs/Symptoms:Thoracentesis   COMPARISON: None.   ACCESSION NUMBER(S): [id number]   ORDERING CLINICIAN: CM AKINS   TECHNIQUE: INTERVENTIONALIST: Lizbeth Escamilla   CONSENT: The patient/patient's POA/next of kin was informed of the nature of the proposed procedure. Risks were discussed including but not limited to bleeding, infection, pain at insertion site, or pneumo/hemothorax requiring chest tube placement. Purpose of treatment and alternative options were also reviewed. All questions were answered and patient agreed to proceed.   SEDATION: None   MEDICATION/CONTRAST: Lidocaine 1%.   TIME OUT: A time out was performed immediately prior to procedure start with the interventional team, correctly identifying the patient name, date of birth, MRN, procedure, anatomy (including marking of site and side), patient position, procedure consent form, relevant laboratory and imaging test results, antibiotic administration, safety precautions, and procedure-specific equipment needs.   FINDINGS: The patient was placed in the left lateral decubitus position.   The patient's right pleural space was scanned using the ultrasound probe. The area of fluid most amenable to drainage was marked. Color doppler was used to assess for vasculature in the intended field.   The patient's skin was sterilized using chlorhexidine and draped in sterile manner. The skin was anesthestized with 1% lidocaine.  Under real time ultrasound guidance, a 5F valved centesis catheter was inserted into the right pleural space " where previously marked. A total of 500 mL of cloudy jc pleural fluid was removed. The catheter was then removed and a sterile op site was placed over the insertion site. Sterile technique used throughout entirety of procedure.   No labs ordered per ordering provider. Fluid was discarded.   The patient tolerated the procedure well and there were no immediate complications.       Uneventful thoracentesis, as detailed above: Right Pleural space, 500 mL   Performed and dictated at [facility].   Signed by: Rich You 10/29/2024 1:12 PM Dictation workstation:   [id number]    XR chest 1 view    Result Date: 10/29/2024  Interpreted By:  Florida Whitfield, STUDY: XR CHEST 1 VIEW;  10/29/2024 12:11 pm   INDICATION: Signs/Symptoms:s/p right thora.     COMPARISON: 10/28/2024   ACCESSION NUMBER(S): [id number]   ORDERING CLINICIAN: RICH YOU   FINDINGS: Artifact from overlying monitoring leads. Decreased right lower chest opacity with residual pleural density/nodularity laterally and blunting of the costophrenic angle. No pneumothorax. The left lung is clear. Cardiac silhouette within normal limits for size. Partially included reverse right shoulder arthroplasty again seen.       Decreased right lower chest opacity following reported thoracentesis. No pneumothorax.   MACRO: None.   Signed by: Florida Whitfield 10/29/2024 12:28 PM Dictation workstation:   [id number]    Transthoracic Echo (TTE) Complete    Result Date: 10/28/2024   [facility], [address] [place] [address]              Tel [phone] and Fax [phone] TRANSTHORACIC ECHOCARDIOGRAM REPORT  Patient Name:      SULAIMAN SUH        Reading Physician:    [id number] Jax Nicole MD Study Date:        10/28/2024           Ordering Provider:    [id number] SESAY MRN/PID:            [id number]             Fellow: Accession#:        [id number]         Nurse: Date of Birth/Age: 1935 / 89 years Sonographer:          Sreedhar Davison RDCS Gender:            F                    Additional Staff: Height:            150.00 cm            Admit Date: Weight:            48.50 kg             Admission Status:     Inpatient -                                                               Routine BSA / BMI:         1.41 m2 / 21.56      Encounter#:           [id number]                    kg/m2 Blood Pressure:    121/83 mmHg          Department Location:  [facility] Non                                                               Invasive Study Type:    TRANSTHORACIC ECHO (TTE) COMPLETE Diagnosis/ICD: 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 Study Detail: The following Echo studies were performed: 2D, M-Mode, Doppler and               color flow.  PHYSICIAN INTERPRETATION: Left Ventricle: Left ventricular ejection fraction is normal, by visual estimate at 65-70%. There are no regional left ventricular wall motion abnormalities. The left ventricular cavity size was not assessed. Spectral Doppler shows an impaired relaxation pattern of left ventricular diastolic filling. Left Atrium: The left atrium was not assessed. Increased thickness of the atrial septum (sparing the fossa ovalis) is present. This is consistent with lipomatous hypertrophy of the atrial septum. Right Ventricle: The right ventricle was not assessed. There is normal right ventricular global systolic function. Right Atrium: The right atrium was not assessed. Aortic Valve: The aortic valve was not well visualized. The aortic valve dimensionless index is 1.12. There is mild aortic valve regurgitation. The peak instantaneous gradient of the aortic  valve is 21.7 mmHg. The mean gradient of the aortic valve is 10.2 mmHg. Mitral Valve: The mitral valve is normal in structure. There is severe mitral annular calcification. There is mild mitral valve regurgitation. Tricuspid Valve: The tricuspid valve is structurally normal. There is mild tricuspid regurgitation. The Doppler estimated RVSP is mildly elevated at 34.9 mmHg. Pulmonic Valve: The pulmonic valve is not well visualized. Pulmonic valve regurgitation was not assessed. Pericardium: There is no pericardial effusion noted. Aorta: The aortic root was not assessed. Systemic Veins: The inferior vena cava appears small in size. In comparison to the previous echocardiogram(s): Compared with study dated 6/7/2024, there is a greater degree of aortic regrugitation now evident (present previously but not reported).  CONCLUSIONS:  1. Left ventricular ejection fraction is normal, by visual estimate at 65-70%.  2. Spectral Doppler shows an impaired relaxation pattern of left ventricular diastolic filling.  3. There is normal right ventricular global systolic function.  4. There is severe mitral annular calcification.  5. Mildly elevated right ventricular systolic pressure.  6. Mild aortic valve regurgitation. QUANTITATIVE DATA SUMMARY:  2D MEASUREMENTS:          Normal Ranges: IVSd:            0.92 cm  (0.6-1.1cm) LVPWd:           1.07 cm  (0.6-1.1cm) LVIDd:           2.90 cm  (3.9-5.9cm) LVIDs:           1.81 cm LV Mass Index:   55 g/m2 LVEDV Index:     39 ml/m2 LV % FS          37.8 %  M-MODE MEASUREMENTS:         Normal Ranges: LAs:                 2.22 cm (2.7-4.0cm)  LV SYSTOLIC FUNCTION BY 2D PLANIMETRY (MOD):                      Normal Ranges: EF-A4C View:    28 % (>=55%) EF-A2C View:    27 % EF-Biplane:     27 % EF-Visual:      68 % LV EF Reported: 68 %  LV DIASTOLIC FUNCTION:          Normal Ranges: MV Peak E:             0.69 m/s (0.7-1.2 m/s) MV Peak A:             1.28 m/s (0.42-0.7 m/s) E/A Ratio:              0.54     (1.0-2.2)  MITRAL VALVE:          Normal Ranges: MV Vmax:      1.48 m/s (<=1.3m/s) MV peak P.7 mmHg (<5mmHg) MV mean P.4 mmHg (<2mmHg) MV VTI:       31.32 cm (10-13cm) MV DT:        371 msec (150-240msec)  AORTIC VALVE:                      Normal Ranges: AoV Vmax:                2.33 m/s  (<=1.7m/s) AoV Peak P.7 mmHg (<20mmHg) AoV Mean PG:             10.2 mmHg (1.7-11.5mmHg) LVOT Max Mario:            2.35 m/s  (<=1.1m/s) AoV VTI:                 32.23 cm  (18-25cm) LVOT VTI:                36.21 cm AoV Dimensionless Index: 1.12  TRICUSPID VALVE/RVSP:          Normal Ranges: Peak TR Velocity:     2.82 m/s RV Syst Pressure:     35 mmHg  (< 30mmHg)  [id number] Jax Nicole MD Electronically signed on 10/28/2024 at 7:13:20 PM  ** Final **     US thoracentesis    Result Date: 10/28/2024  Interpreted By:  Ran Mitchell, STUDY: US [id number]/28/29647:27 pm   INDICATION: Signs/Symptoms:pleural effusion   COMPARISON: CT chest 10/25/2024   ACCESSION NUMBER(S): [id number]   ORDERING CLINICIAN: CM AKINS   TECHNIQUE: INTERVENTIONALIST(S): Ran Mitchell   CONSENT: The patient/patient's POA/next of kin was informed of the nature of the proposed procedure. The purposes, alternatives, risks, and benefits were explained and discussed. All questions were answered and consent was obtained.   SEDATION: None   MEDICATION/CONTRAST: 1% lidocaine was used to anesthetize subcutaneously.   TIME OUT: A time out was performed immediately prior to procedure start with the interventional team, correctly identifying the patient name, date of birth, MRN, procedure, anatomy (including marking of site and side), patient position, procedure consent form, relevant laboratory and imaging test results, antibiotic administration, safety precautions, and procedure-specific equipment needs.   FINDINGS: The patient was placed in the sitting position.   The pleural space was examined with grey scale  ultrasound, and the most accessible fluid identified and marked for thoracentesis.   The skin was prepped and draped in usual manner. Local anesthesia with lidocaine was administered and a right-sided thoracentesis was performed.  A 5 Swedish One-Step Valved thoracentesis needle/catheter was then placed where marked. Approximately 1,300 mL of Evelyne colored fluid was removed.  The needle/catheter was then withdrawn.   The patient tolerated the procedure well and there were no immediate complications. Specimen(s) sent to the laboratory for further evaluation, per the requesting team.       Uneventful thoracentesis, as detailed above. Right pleural space, 1,300 mL   I personally performed and/or directly supervised this study and was present for the entire procedure.   Performed and dictated at [facility].   Signed by: Noé Mitchell 10/28/2024 5:28 PM Dictation workstation:   [id number]    XR chest 1 view    Result Date: 10/28/2024  Interpreted By:  Chyna Nguyen, STUDY: XR CHEST 1 VIEW;  10/28/2024 10:19 am   INDICATION: Signs/Symptoms:s/p right thora.   COMPARISON: 10/25/2024; CT chest dated 10/25/2024   ACCESSION NUMBER(S): [id number]   ORDERING CLINICIAN: NOÉ MITCHELL   FINDINGS: Heart is normal in size. There is a suggestion of calcification of the mitral annulus.   There is a right pleural effusion. There is what may resent loculation of fluid versus a pleural-based mass on the right.   There is no obvious pneumothorax.   The patient status post right shoulder replacement.   COMPARISON OF FINDING: The right pleural effusion appears smaller.       Right pleural fluid.   MACRO: none   Signed by: Chyna Nguyen 10/28/2024 11:43 AM Dictation workstation:   [id number]   Current Facility-Administered Medications   Medication Dose Route Frequency Provider Last Rate Last Admin    atorvastatin (Lipitor) tablet 40 mg  40 mg oral Nightly Alana Kwan MD   40 mg at 10/29/24 2028     benzocaine-menthol (Cepastat Sore Throat) lozenge 1 lozenge  1 lozenge Mouth/Throat q2h PRN Alana Kwan MD   1 lozenge at 10/28/24 1159    cetirizine (ZyrTEC) tablet 10 mg  10 mg oral Daily Alana Kwan MD   10 mg at 10/29/24 0809    cholecalciferol (Vitamin D-3) tablet 2,000 Units  2,000 Units oral Daily Alana Kwan MD   2,000 Units at 10/30/24 0615    DULoxetine (Cymbalta) DR capsule 30 mg  30 mg oral Daily Alana Kwan MD   30 mg at 10/28/24 1100    famotidine (Pepcid) tablet 20 mg  20 mg oral Nightly Alana Kwan MD   20 mg at 10/29/24 2027    furosemide (Lasix) tablet 20 mg  20 mg oral Daily Alana Kwan MD   20 mg at 10/29/24 0809    heparin (porcine) injection 5,000 Units  5,000 Units subcutaneous q12h Alana Kwan MD   5,000 Units at 10/30/24 0615    mirtazapine (Remeron) tablet 7.5 mg  7.5 mg oral Nightly Alana Kwan MD   7.5 mg at 10/29/24 2028    polyethylene glycol (Glycolax, Miralax) packet 17 g  17 g oral Daily Alana Kwan MD   17 g at 10/27/24 1346    [Held by provider] spironolactone (Aldactone) tablet 25 mg  25 mg oral Daily Alana Kwan MD   25 mg at 10/26/24 0913     No current outpatient medications on file.   ..     Medication and Non-Pharmacologic VTE Prophylaxis/Anticoagulants      Last Anticoag Admin            heparin (porcine) injection 5,000 Units    Given 5,000 Units at 0615    Frequency: Every 12 hours         There are additional administrations since 10/27/24 0817 that are not shown.    No unadministered anticoagulant orders found.          Assessment/Plan   Kajal R Cut Off has  Principal Problem:    Malignant Pleural effusion     Metastatic adenocarcinoma    Deconditioning    CKD IV   S/P Thoracentesis X 2    Will get PT/OT   Other Hospital problems        Abnormal findings not addressed during hospitalization, but require out patient follow up. None        I spent 35 minutes talking and examining Kajal Null,  reviewing the labs & medications, formulating plan of care & discussing with patient and nursing staff.    Alana Kwan MD

## 2024-10-30 NOTE — CARE PLAN
The patient's goals for the shift include maintain safety    The clinical goals for the shift include maintain safety    Over the shift, the patient did not make progress toward the following goals. Barriers to progression include . Recommendations to address these barriers include manage symptoms.

## 2024-10-31 ENCOUNTER — HOME HEALTH ADMISSION (OUTPATIENT)
Dept: HOME HEALTH SERVICES | Facility: HOME HEALTH | Age: 89
End: 2024-10-31
Payer: MEDICARE

## 2024-10-31 ENCOUNTER — APPOINTMENT (OUTPATIENT)
Dept: RADIOLOGY | Facility: HOSPITAL | Age: 89
DRG: 844 | End: 2024-10-31
Payer: MEDICARE

## 2024-10-31 ENCOUNTER — DOCUMENTATION (OUTPATIENT)
Dept: HOME HEALTH SERVICES | Facility: HOME HEALTH | Age: 89
End: 2024-10-31
Payer: MEDICARE

## 2024-10-31 VITALS
OXYGEN SATURATION: 96 % | BODY MASS INDEX: 21.57 KG/M2 | RESPIRATION RATE: 17 BRPM | HEART RATE: 73 BPM | HEIGHT: 59 IN | DIASTOLIC BLOOD PRESSURE: 62 MMHG | WEIGHT: 107 LBS | TEMPERATURE: 97.2 F | SYSTOLIC BLOOD PRESSURE: 99 MMHG

## 2024-10-31 LAB
ATRIAL RATE: 97 BPM
BACTERIA FLD CULT: NORMAL
GRAM STN SPEC: NORMAL
GRAM STN SPEC: NORMAL
LAB AP ASR DISCLAIMER: NORMAL
LABORATORY COMMENT REPORT: NORMAL
LABORATORY COMMENT REPORT: NORMAL
P AXIS: 67 DEGREES
P OFFSET: 195 MS
P ONSET: 148 MS
PATH REPORT.FINAL DX SPEC: NORMAL
PATH REPORT.GROSS SPEC: NORMAL
PATH REPORT.RELEVANT HX SPEC: NORMAL
PATH REPORT.TOTAL CANCER: NORMAL
PR INTERVAL: 156 MS
Q ONSET: 226 MS
QRS COUNT: 16 BEATS
QRS DURATION: 100 MS
QT INTERVAL: 344 MS
QTC CALCULATION(BAZETT): 436 MS
QTC FREDERICIA: 403 MS
R AXIS: 27 DEGREES
T AXIS: 58 DEGREES
T OFFSET: 398 MS
VENTRICULAR RATE: 97 BPM

## 2024-10-31 PROCEDURE — 2500000001 HC RX 250 WO HCPCS SELF ADMINISTERED DRUGS (ALT 637 FOR MEDICARE OP): Performed by: INTERNAL MEDICINE

## 2024-10-31 PROCEDURE — 2500000004 HC RX 250 GENERAL PHARMACY W/ HCPCS (ALT 636 FOR OP/ED): Performed by: INTERNAL MEDICINE

## 2024-10-31 RX ADMIN — FUROSEMIDE 20 MG: 20 TABLET ORAL at 08:30

## 2024-10-31 RX ADMIN — Medication 2000 UNITS: at 06:43

## 2024-10-31 RX ADMIN — CETIRIZINE HYDROCHLORIDE 10 MG: 10 TABLET, FILM COATED ORAL at 08:30

## 2024-10-31 RX ADMIN — HEPARIN SODIUM 5000 UNITS: 5000 INJECTION INTRAVENOUS; SUBCUTANEOUS at 06:43

## 2024-10-31 ASSESSMENT — PAIN - FUNCTIONAL ASSESSMENT: PAIN_FUNCTIONAL_ASSESSMENT: 0-10

## 2024-10-31 ASSESSMENT — PAIN SCALES - GENERAL: PAINLEVEL_OUTOF10: 0 - NO PAIN

## 2024-10-31 NOTE — PROGRESS NOTES
10/31/24 1000   Discharge Planning   Home or Post Acute Services In home services   Type of Home Care Services Home OT;Home PT   Expected Discharge Disposition Home H   Does the patient need discharge transport arranged? No     Spoke with pt and her sone and informed them of the [facility] SOC 11/2. Son inquired about oncology appointment. I explained that should be taken care of by the floor  and if not she can direct you to the right place. They denied further needs at this time. [facility] 19/18. ADOD is today. CT to follow. Amarilis Dutta BSN/RN-TCC

## 2024-10-31 NOTE — NURSING NOTE

## 2024-10-31 NOTE — CARE PLAN
The patient's goals for the shift include maintain safety    The clinical goals for the shift include maintain safety    Over the shift, the patient did not make progress toward the following goals. Barriers to progression include . Recommendations to address these barriers include safety precautions.

## 2024-10-31 NOTE — PROGRESS NOTES
"[facility]          Admitting Provider: Alana Kwan MD Admission Date: 10/25/2024.   Attending Provider: Alana Kwan MD MRN: [id number]       Dell Null is a 89 y.o. female on day 6 of admission presenting with Pleural effusion.  Interval History none.     Objective   Physical Exam  Last Recorded Vitals: Blood pressure 99/62, pulse 73, temperature 36.2 °C (97.2 °F), temperature source Temporal, resp. rate 17, height 1.499 m (4' 11\"), weight 48.5 kg (107 lb), SpO2 96%.  Patient Vitals for the past 24 hrs:   BP Temp Temp src Pulse Resp SpO2   10/31/24 0739 99/62 36.2 °C (97.2 °F) Temporal 73 17 96 %   10/30/24 1932 102/69 36.2 °C (97.2 °F) Temporal 110 16 94 %   10/30/24 1535 99/69 37.1 °C (98.8 °F) Temporal 74 16 97 %   10/30/24 0818 102/70 36.9 °C (98.4 °F) Temporal 76 16 94 %     Body mass index is 21.61 kg/m².  GENERAL: alert, cooperative, or no distress  SKIN: no rashes  HEENT Atraumatic, Normocephalic and Not pale, no icterus  NECK: supple, no thyromegaly, JVP within normal limits  LUNGS:  not in respiratory distress, respiratory rate normal  CARDIAC: rate regular and regular rhythm,  ABDOMEN: Soft, non-tender.  EXTREMITIES: No edema  NEURO: Alert and oriented x 3  Intake/Output last 3 Shifts:  I/O last 3 completed shifts:  In: 200 (4.1 mL/kg) [P.O.:200]  Out: - (0 mL/kg)   Weight: 48.5 kg   DATA:   Diagnostic tests reviewed for today's visit:    Most recent Labs  No results found for this or any previous visit (from the past 24 hours).  Urine Culture   Date Value Ref Range Status   10/07/2024 No significant growth  Final    No results found for: \"RESPCULTSM\" No results found for: \"PERDIAFLDCUL\"   Sterile Fluid Culture/Smear   Date Value Ref Range Status   10/28/2024 No growth to date  Preliminary      US thoracentesis    Result Date: 10/29/2024  Interpreted By:  Lizbeth Escamilla, STUDY: US THORACENTESIS; 10/29/13100:11 pm   INDICATION: " Signs/Symptoms:Thoracentesis   COMPARISON: None.   ACCESSION NUMBER(S): [id number]   ORDERING CLINICIAN: CM AKINS   TECHNIQUE: INTERVENTIONALIST: Lizbeth Escamilla   CONSENT: The patient/patient's POA/next of kin was informed of the nature of the proposed procedure. Risks were discussed including but not limited to bleeding, infection, pain at insertion site, or pneumo/hemothorax requiring chest tube placement. Purpose of treatment and alternative options were also reviewed. All questions were answered and patient agreed to proceed.   SEDATION: None   MEDICATION/CONTRAST: Lidocaine 1%.   TIME OUT: A time out was performed immediately prior to procedure start with the interventional team, correctly identifying the patient name, date of birth, MRN, procedure, anatomy (including marking of site and side), patient position, procedure consent form, relevant laboratory and imaging test results, antibiotic administration, safety precautions, and procedure-specific equipment needs.   FINDINGS: The patient was placed in the left lateral decubitus position.   The patient's right pleural space was scanned using the ultrasound probe. The area of fluid most amenable to drainage was marked. Color doppler was used to assess for vasculature in the intended field.   The patient's skin was sterilized using chlorhexidine and draped in sterile manner. The skin was anesthestized with 1% lidocaine.  Under real time ultrasound guidance, a 5F valved centesis catheter was inserted into the right pleural space where previously marked. A total of 500 mL of cloudy jc pleural fluid was removed. The catheter was then removed and a sterile op site was placed over the insertion site. Sterile technique used throughout entirety of procedure.   No labs ordered per ordering provider. Fluid was discarded.   The patient tolerated the procedure well and there were no immediate complications.       Uneventful thoracentesis, as detailed  above: Right Pleural space, 500 mL   Performed and dictated at [facility].   Signed by: Rich Escamilla 10/29/2024 1:12 PM Dictation workstation:   [id number]    XR chest 1 view    Result Date: 10/29/2024  Interpreted By:  Florida Whitfield, STUDY: XR CHEST 1 VIEW;  10/29/2024 12:11 pm   INDICATION: Signs/Symptoms:s/p right thora.     COMPARISON: 10/28/2024   ACCESSION NUMBER(S): [id number]   ORDERING CLINICIAN: RICH ESCAMILLA   FINDINGS: Artifact from overlying monitoring leads. Decreased right lower chest opacity with residual pleural density/nodularity laterally and blunting of the costophrenic angle. No pneumothorax. The left lung is clear. Cardiac silhouette within normal limits for size. Partially included reverse right shoulder arthroplasty again seen.       Decreased right lower chest opacity following reported thoracentesis. No pneumothorax.   MACRO: None.   Signed by: Florida Whitfield 10/29/2024 12:28 PM Dictation workstation:   [id number]   Current Facility-Administered Medications   Medication Dose Route Frequency Provider Last Rate Last Admin    atorvastatin (Lipitor) tablet 40 mg  40 mg oral Nightly Alana Kwan MD   40 mg at 10/30/24 2121    benzocaine-menthol (Cepastat Sore Throat) lozenge 1 lozenge  1 lozenge Mouth/Throat q2h PRN Alana Kwan MD   1 lozenge at 10/28/24 1159    cetirizine (ZyrTEC) tablet 10 mg  10 mg oral Daily Alana Kwan MD   10 mg at 10/30/24 0929    cholecalciferol (Vitamin D-3) tablet 2,000 Units  2,000 Units oral Daily Alana Kwan MD   2,000 Units at 10/31/24 0643    DULoxetine (Cymbalta) DR capsule 30 mg  30 mg oral Daily Alnaa Kwan MD   30 mg at 10/28/24 1100    famotidine (Pepcid) tablet 20 mg  20 mg oral Nightly Alana Kwan MD   20 mg at 10/30/24 2121    furosemide (Lasix) tablet 20 mg  20 mg oral Daily Alana Kwan MD   20 mg at 10/30/24 0929    heparin (porcine) injection 5,000  Units  5,000 Units subcutaneous q12h Alana Kwan MD   5,000 Units at 10/31/24 0643    mirtazapine (Remeron) tablet 7.5 mg  7.5 mg oral Nightly Alana Kwan MD   7.5 mg at 10/30/24 2121    polyethylene glycol (Glycolax, Miralax) packet 17 g  17 g oral Daily Alana Kwan MD   17 g at 10/30/24 0929    [Held by provider] spironolactone (Aldactone) tablet 25 mg  25 mg oral Daily Alana Kwan MD   25 mg at 10/26/24 0913     No current outpatient medications on file.   ..     Medication and Non-Pharmacologic VTE Prophylaxis/Anticoagulants      Last Anticoag Admin            heparin (porcine) injection 5,000 Units    Given 5,000 Units at 0643    Frequency: Every 12 hours         There are additional administrations since 10/28/24 0814 that are not shown.    No unadministered anticoagulant orders found.          Assessment/Plan   Kajal Null has  Principal Problem:    Malignant Pleural effusion    Metastatic adenocarcinoma    Deconditioning    CKD IV   S/P Thoracentesis X 2     Other Hospital problems        Abnormal findings not addressed during hospitalization, but require out patient follow up. None        I spent 40 minutes talking and examining Kajal R Samson, reviewing the labs & medications, formulating plan of care & discussing with patient and nursing staff.    Alana Kwan MD

## 2024-11-01 ENCOUNTER — PATIENT OUTREACH (OUTPATIENT)
Dept: PRIMARY CARE | Facility: CLINIC | Age: 89
End: 2024-11-01
Payer: MEDICARE

## 2024-11-01 DIAGNOSIS — Z09 HOSPITAL DISCHARGE FOLLOW-UP: ICD-10-CM

## 2024-11-01 DIAGNOSIS — J90 PLEURAL EFFUSION: ICD-10-CM

## 2024-11-03 ENCOUNTER — HOME CARE VISIT (OUTPATIENT)
Dept: HOME HEALTH SERVICES | Facility: HOME HEALTH | Age: 89
End: 2024-11-03
Payer: MEDICARE

## 2024-11-03 PROCEDURE — G0299 HHS/HOSPICE OF RN EA 15 MIN: HCPCS | Mod: HHH

## 2024-11-03 PROCEDURE — 169592 NO-PAY CLAIM PROCEDURE

## 2024-11-04 ENCOUNTER — PATIENT OUTREACH (OUTPATIENT)
Dept: HEMATOLOGY/ONCOLOGY | Facility: CLINIC | Age: 89
End: 2024-11-04
Payer: MEDICARE

## 2024-11-04 ENCOUNTER — HOME CARE VISIT (OUTPATIENT)
Dept: HOME HEALTH SERVICES | Facility: HOME HEALTH | Age: 89
End: 2024-11-04
Payer: MEDICARE

## 2024-11-04 ENCOUNTER — TUMOR BOARD CONFERENCE (OUTPATIENT)
Dept: HEMATOLOGY/ONCOLOGY | Facility: CLINIC | Age: 89
End: 2024-11-04

## 2024-11-04 VITALS
TEMPERATURE: 97.8 F | RESPIRATION RATE: 18 BRPM | SYSTOLIC BLOOD PRESSURE: 90 MMHG | DIASTOLIC BLOOD PRESSURE: 58 MMHG | HEART RATE: 68 BPM

## 2024-11-04 VITALS
DIASTOLIC BLOOD PRESSURE: 70 MMHG | OXYGEN SATURATION: 95 % | TEMPERATURE: 97.6 F | BODY MASS INDEX: 22.04 KG/M2 | HEART RATE: 90 BPM | HEIGHT: 58 IN | SYSTOLIC BLOOD PRESSURE: 110 MMHG | WEIGHT: 105 LBS | RESPIRATION RATE: 20 BRPM

## 2024-11-04 PROCEDURE — G0151 HHCP-SERV OF PT,EA 15 MIN: HCPCS | Mod: HHH

## 2024-11-04 ASSESSMENT — BALANCE ASSESSMENTS
BALANCE SCORE: 12
STANDING BALANCE: 1 - STEADY BUT WIDE STANCE AND USES CANE OR OTHER SUPPORT
IMMEDIATE STANDING BALANCE FIRST 5 SECONDS: 2 - STEADY WITHOUT WALKER OR OTHER SUPPORT
NUDGED SCORE: 2
SITTING BALANCE: 1 - STEADY, SAFE
EYES CLOSED AT MAXIMUM POSITION NUDGED: 1 - STEADY
TURNING 360 DEGREES STEPS: 0 - DISCONTINUOUS STEPS
SITTING DOWN: 1 - USES ARMS OR NOT SMOOTH MOTION
ATTEMPTS TO ARISE: 2 - ABLE TO RISE, ONE ATTEMPT
ARISES: 1 - ABLE, USES ARMS TO HELP
NUDGED: 2 - STEADY
ARISING SCORE: 1

## 2024-11-04 ASSESSMENT — GAIT ASSESSMENTS
BALANCE AND GAIT SCORE: 19
TRUNK SCORE: 1
STEP SYMMETRY: 1 - RIGHT AND LEFT STEP LENGTH APPEAR EQUAL
INITIATION OF GAIT IMMEDIATELY AFTER GO: 0 - ANY HESITANCY OR MULTIPLE ATTEMPTS TO START
STEP CONTINUITY: 0 - STOPPING OR DISCONTINUITY BETWEEN STEPS
PATH: 1 - MILD/MODERATE DEVIATION OR USES WALKING AID
GAIT SCORE: 7
PATH SCORE: 1
TRUNK: 1 - NO SWAY BUT FLEXION OF KNEES OR BACK OR SPREADS ARMS WHILE WALKING
WALKING STANCE: 0 - HEELS APART

## 2024-11-04 ASSESSMENT — ENCOUNTER SYMPTOMS
PERSON REPORTING PAIN: PATIENT
OCCASIONAL FEELINGS OF UNSTEADINESS: 0
LAST BOWEL MOVEMENT: 67147
LOWEST PAIN SEVERITY IN PAST 24 HOURS: 1/10
PAIN: 1
FATIGUE: 1
PAIN SEVERITY GOAL: 0/10
HIGHEST PAIN SEVERITY IN PAST 24 HOURS: 7/10
PAIN LOCATION - PAIN SEVERITY: 1/10
LOSS OF SENSATION IN FEET: 0
SUBJECTIVE PAIN PROGRESSION: UNCHANGED
APPETITE LEVEL: FAIR
DENIES PAIN: 1
PERSON REPORTING PAIN: PATIENT
PAIN LOCATION: BACK
DEPRESSION: 0

## 2024-11-04 ASSESSMENT — ACTIVITIES OF DAILY LIVING (ADL)
ENTERING_EXITING_HOME: NEEDS ASSISTANCE
OASIS_M1830: 03
AMBULATION ASSISTANCE ON FLAT SURFACES: 1

## 2024-11-04 NOTE — TUMOR BOARD NOTE
Patient Name: SULAIMAN SUH  MRN: [id number]  Physician:   Date of Collection: 10-  Report Date: 10.31.2024  Primary Location of Tumor:   Histology: Adenocarcinoma  Stage: IV  Location of Metastasis: Pleura   PDL 1:   Actionable Alteration:  None  Disease Relevant Alterations: None  Recommendation: Re-biopsy or Send Liquid [facility]us.

## 2024-11-04 NOTE — Clinical Note
PT assessment completed for home health today. Pt presents with B LE weakness and slight balance deficit affecting her mobility. We will see her twice weekly for 3 weeks.

## 2024-11-04 NOTE — PROGRESS NOTES
Call made to patient's son about obtaining a NPV. Offered 11.5.2024 with Dr. Brooke at [facility] however patient has a PET Scan at [facility] that day. Patient re-scheduled for 11.13.2024 at  [facility] at 1pm. Son agreeable with plan. Information given on location and office number to call with any questions. No additional needs at this time.

## 2024-11-04 NOTE — HOME HEALTH
S: This pt was referred to home health PT following hospitalization for malignant pleural effusion. She also had a fall 2 years ago and cracked her tailbone which still cauises pain. Pt has a history of laryngeal cancer. She is demonstrating increased difficulty with mobility as a result. Her main goals is to improve her stability with walking.    B: Pt lives alone in a 1 story condo without stairs to exit the building. Pt is using a cane or wheeled walker with SBA for some ambulation but was independent with ambulation without use of assistive device prior to current episode of care. Pt reports no recent falls and rates current pain level at 1/10. Pt reports no change in medications.    A: Pt presents with B LE weakness affecting gait / stairs and balance, as well as gait and balance impairment as illustrated by Tinetti score of 19/28. Pt ambulates without assistive device with SBA today demonstrating mild instability and inconsistent rhythm. Pt instructed in and performed deep breathing exercises with good return demo and good understanding. Pictures with written instructions of seated B LE AROM exercises issued, practiced and reviewed for HEP with good return demo and good understanding. Pt instructed to use cane or wheeled walker when she is feeling fatigued and at night.     R: Pt will benefit from skilled PT for LE strengthening to facilitate bed mobility, transfers, gait / stairs and balance, as well as transfer, gait and balance training to improve functional mobility and independence.

## 2024-11-05 ENCOUNTER — HOME CARE VISIT (OUTPATIENT)
Dept: HOME HEALTH SERVICES | Facility: HOME HEALTH | Age: 89
End: 2024-11-05
Payer: MEDICARE

## 2024-11-06 LAB
ACID FAST STN SPEC: NORMAL
MYCOBACTERIUM SPEC CULT: NORMAL

## 2024-11-07 ENCOUNTER — HOME CARE VISIT (OUTPATIENT)
Dept: HOME HEALTH SERVICES | Facility: HOME HEALTH | Age: 89
End: 2024-11-07
Payer: MEDICARE

## 2024-11-07 VITALS — RESPIRATION RATE: 18 BRPM | DIASTOLIC BLOOD PRESSURE: 58 MMHG | SYSTOLIC BLOOD PRESSURE: 100 MMHG

## 2024-11-07 PROCEDURE — G0157 HHC PT ASSISTANT EA 15: HCPCS | Mod: CQ,HHH

## 2024-11-07 ASSESSMENT — ENCOUNTER SYMPTOMS
MUSCLE WEAKNESS: 1
DENIES PAIN: 1
PERSON REPORTING PAIN: PATIENT

## 2024-11-07 ASSESSMENT — ACTIVITIES OF DAILY LIVING (ADL): AMBULATION ASSISTANCE: ONE PERSON

## 2024-11-11 ENCOUNTER — HOME CARE VISIT (OUTPATIENT)
Dept: HOME HEALTH SERVICES | Facility: HOME HEALTH | Age: 89
End: 2024-11-11
Payer: MEDICARE

## 2024-11-11 VITALS
HEART RATE: 72 BPM | DIASTOLIC BLOOD PRESSURE: 72 MMHG | SYSTOLIC BLOOD PRESSURE: 110 MMHG | OXYGEN SATURATION: 98 % | TEMPERATURE: 98 F | RESPIRATION RATE: 18 BRPM

## 2024-11-11 PROCEDURE — G0157 HHC PT ASSISTANT EA 15: HCPCS | Mod: CQ,HHH

## 2024-11-11 ASSESSMENT — ENCOUNTER SYMPTOMS
MUSCLE WEAKNESS: 1
DENIES PAIN: 1
PERSON REPORTING PAIN: PATIENT

## 2024-11-12 ENCOUNTER — HOME CARE VISIT (OUTPATIENT)
Dept: HOME HEALTH SERVICES | Facility: HOME HEALTH | Age: 89
End: 2024-11-12
Payer: MEDICARE

## 2024-11-12 VITALS
HEART RATE: 66 BPM | RESPIRATION RATE: 16 BRPM | WEIGHT: 109 LBS | SYSTOLIC BLOOD PRESSURE: 100 MMHG | DIASTOLIC BLOOD PRESSURE: 60 MMHG | OXYGEN SATURATION: 99 % | BODY MASS INDEX: 22.78 KG/M2 | TEMPERATURE: 97.8 F

## 2024-11-12 PROCEDURE — G0299 HHS/HOSPICE OF RN EA 15 MIN: HCPCS | Mod: HHH

## 2024-11-13 ENCOUNTER — APPOINTMENT (OUTPATIENT)
Dept: HEMATOLOGY/ONCOLOGY | Facility: CLINIC | Age: 89
End: 2024-11-13
Payer: MEDICARE

## 2024-11-13 LAB
ACID FAST STN SPEC: NORMAL
MYCOBACTERIUM SPEC CULT: NORMAL

## 2024-11-13 ASSESSMENT — ENCOUNTER SYMPTOMS: FORGETFULNESS: 1

## 2024-11-14 ENCOUNTER — HOME CARE VISIT (OUTPATIENT)
Dept: HOME HEALTH SERVICES | Facility: HOME HEALTH | Age: 89
End: 2024-11-14
Payer: MEDICARE

## 2024-11-14 VITALS
TEMPERATURE: 97.2 F | DIASTOLIC BLOOD PRESSURE: 60 MMHG | HEART RATE: 79 BPM | SYSTOLIC BLOOD PRESSURE: 120 MMHG | RESPIRATION RATE: 18 BRPM | OXYGEN SATURATION: 98 %

## 2024-11-14 PROCEDURE — G0157 HHC PT ASSISTANT EA 15: HCPCS | Mod: CQ,HHH

## 2024-11-14 ASSESSMENT — ENCOUNTER SYMPTOMS
PAIN LOCATION - PAIN SEVERITY: 3/10
MUSCLE WEAKNESS: 1
PERSON REPORTING PAIN: PATIENT
PAIN LOCATION: LEFT SHOULDER
PAIN LOCATION - PAIN QUALITY: ACHE
PAIN LOCATION - PAIN FREQUENCY: INTERMITTENT
PAIN LOCATION - PAIN DURATION: DAILY
ARTHRALGIAS: 1
PAIN: 1

## 2024-11-18 ENCOUNTER — HOME CARE VISIT (OUTPATIENT)
Dept: HOME HEALTH SERVICES | Facility: HOME HEALTH | Age: 89
End: 2024-11-18
Payer: MEDICARE

## 2024-11-19 ENCOUNTER — HOME CARE VISIT (OUTPATIENT)
Dept: HOME HEALTH SERVICES | Facility: HOME HEALTH | Age: 89
End: 2024-11-19
Payer: MEDICARE

## 2024-11-19 DIAGNOSIS — J90 PLEURAL EFFUSION: Primary | ICD-10-CM

## 2024-11-19 PROCEDURE — G0157 HHC PT ASSISTANT EA 15: HCPCS | Mod: CQ,HHH

## 2024-11-19 PROCEDURE — G0299 HHS/HOSPICE OF RN EA 15 MIN: HCPCS | Mod: HHH

## 2024-11-19 ASSESSMENT — BALANCE ASSESSMENTS
ARISES: 1 - ABLE, USES ARMS TO HELP
BALANCE SCORE: 13
SITTING DOWN: 1 - USES ARMS OR NOT SMOOTH MOTION
NUDGED SCORE: 2
EYES CLOSED AT MAXIMUM POSITION NUDGED: 1 - STEADY
ARISING SCORE: 1
STANDING BALANCE: 1 - STEADY BUT WIDE STANCE AND USES CANE OR OTHER SUPPORT
NUDGED: 2 - STEADY
IMMEDIATE STANDING BALANCE FIRST 5 SECONDS: 2 - STEADY WITHOUT WALKER OR OTHER SUPPORT
ATTEMPTS TO ARISE: 2 - ABLE TO RISE, ONE ATTEMPT
TURNING 360 DEGREES STEPS: 1 - CONTINUOUS STEPS
SITTING BALANCE: 1 - STEADY, SAFE

## 2024-11-19 ASSESSMENT — GAIT ASSESSMENTS
GAIT SCORE: 10
TRUNK SCORE: 2
BALANCE AND GAIT SCORE: 23
STEP CONTINUITY: 1 - STEPS APPEAR CONTINUOUS
INITIATION OF GAIT IMMEDIATELY AFTER GO: 0 - ANY HESITANCY OR MULTIPLE ATTEMPTS TO START
PATH: 2 - STRAIGHT WITHOUT WALKING AID
PATH SCORE: 2
STEP SYMMETRY: 1 - RIGHT AND LEFT STEP LENGTH APPEAR EQUAL
WALKING STANCE: 0 - HEELS APART
TRUNK: 2 - NO SWAY, NO FLEXION, NO USE OF ARMS, NO WALKING AID

## 2024-11-19 ASSESSMENT — ENCOUNTER SYMPTOMS
MUSCLE WEAKNESS: 1
DENIES PAIN: 1
PERSON REPORTING PAIN: PATIENT

## 2024-11-20 ENCOUNTER — APPOINTMENT (OUTPATIENT)
Dept: HEMATOLOGY/ONCOLOGY | Facility: CLINIC | Age: 89
End: 2024-11-20
Payer: MEDICARE

## 2024-11-20 ENCOUNTER — HOSPITAL ENCOUNTER (OUTPATIENT)
Dept: RADIOLOGY | Facility: CLINIC | Age: 89
Discharge: HOME | End: 2024-11-20
Payer: MEDICARE

## 2024-11-20 VITALS
HEART RATE: 76 BPM | OXYGEN SATURATION: 96 % | DIASTOLIC BLOOD PRESSURE: 80 MMHG | TEMPERATURE: 98.5 F | SYSTOLIC BLOOD PRESSURE: 122 MMHG | RESPIRATION RATE: 16 BRPM

## 2024-11-20 DIAGNOSIS — J90 PLEURAL EFFUSION: ICD-10-CM

## 2024-11-20 LAB
ACID FAST STN SPEC: NORMAL
MYCOBACTERIUM SPEC CULT: NORMAL

## 2024-11-20 PROCEDURE — 71046 X-RAY EXAM CHEST 2 VIEWS: CPT

## 2024-11-20 PROCEDURE — 71046 X-RAY EXAM CHEST 2 VIEWS: CPT | Performed by: RADIOLOGY

## 2024-11-20 SDOH — ECONOMIC STABILITY: GENERAL

## 2024-11-20 ASSESSMENT — ENCOUNTER SYMPTOMS
OCCASIONAL FEELINGS OF UNSTEADINESS: 0
FORGETFULNESS: 1
CHANGE IN APPETITE: UNCHANGED
APPETITE LEVEL: FAIR
LAST BOWEL MOVEMENT: 67163

## 2024-11-20 ASSESSMENT — ACTIVITIES OF DAILY LIVING (ADL): MONEY MANAGEMENT (EXPENSES/BILLS): NEEDS ASSISTANCE

## 2024-11-21 ENCOUNTER — HOME CARE VISIT (OUTPATIENT)
Dept: HOME HEALTH SERVICES | Facility: HOME HEALTH | Age: 89
End: 2024-11-21
Payer: MEDICARE

## 2024-11-21 VITALS
HEART RATE: 68 BPM | SYSTOLIC BLOOD PRESSURE: 112 MMHG | RESPIRATION RATE: 16 BRPM | DIASTOLIC BLOOD PRESSURE: 74 MMHG | TEMPERATURE: 98.6 F

## 2024-11-21 PROCEDURE — G0151 HHCP-SERV OF PT,EA 15 MIN: HCPCS | Mod: HHH

## 2024-11-21 ASSESSMENT — BALANCE ASSESSMENTS
ARISES: 1 - ABLE, USES ARMS TO HELP
BALANCE SCORE: 15
EYES CLOSED AT MAXIMUM POSITION NUDGED: 1 - STEADY
SITTING BALANCE: 1 - STEADY, SAFE
STANDING BALANCE: 2 - NARROW STANCE WITHOUT SUPPORT
TURNING 360 DEGREES STEPS: 1 - CONTINUOUS STEPS
IMMEDIATE STANDING BALANCE FIRST 5 SECONDS: 2 - STEADY WITHOUT WALKER OR OTHER SUPPORT
NUDGED: 2 - STEADY
SITTING DOWN: 2 - SAFE, SMOOTH MOTION
NUDGED SCORE: 2
ATTEMPTS TO ARISE: 2 - ABLE TO RISE, ONE ATTEMPT
ARISING SCORE: 1

## 2024-11-21 ASSESSMENT — GAIT ASSESSMENTS
GAIT SCORE: 12
PATH SCORE: 2
TRUNK: 2 - NO SWAY, NO FLEXION, NO USE OF ARMS, NO WALKING AID
STEP CONTINUITY: 1 - STEPS APPEAR CONTINUOUS
INITIATION OF GAIT IMMEDIATELY AFTER GO: 1 - NO HESITANCY
BALANCE AND GAIT SCORE: 27
WALKING STANCE: 1 - HEELS ALMOST TOUCHING WHILE WALKING
TRUNK SCORE: 2
PATH: 2 - STRAIGHT WITHOUT WALKING AID
STEP SYMMETRY: 1 - RIGHT AND LEFT STEP LENGTH APPEAR EQUAL

## 2024-11-21 NOTE — Clinical Note
Pt discharged from home health PT yesterday due to goals met. She is ambulating well within her home without assistive device at this time. Pt educated on benefit of using an acapella breathing device to help rid fluid from her lungs before it gets to the point of needing to have it drained. Pt concerned about future treatments possibly causing her to become weak. Instructed pt to seek new referral for PT if that happens.

## 2024-11-21 NOTE — HOME HEALTH
S: PT reassessment for discharge. Pt being seen for decreased mobility related to laryngeal cancer.    B: Pt reports she has fluid buildup and is waiting to hear from her doctor to see if she needs to go to the hospital to have fluid drained from her lungs. She reports no falls, no change in medications, and rates current pain level at 0/10. Pt has pictoral handouts for HEP and is performing daily.    A: Pt demonstrates good improvement in functional mobility as illustrated by  and improved Tinetti score from 19/28 on SOC to 27/28 today. Pt ambulates safely and independently without assistive device within her house. Pt re-educated on the benefit of using acapella breathing device to reduce fluid buildup in her lungs. PT downloaded YouTube onto pt's phone with her permission and located a video on how to use the device properly. PT showed her and her son pictures of the device and where it can be purchased online. Pt is comfortable and independent with HEP and appropriate for discharge from home health PT at this time. She knows to seek new referral if she starts treatment for her cancer and finds herself becoming weaker.     P: Pt has met goals and is in agreement with discharge from home health PT today.

## 2024-11-26 ENCOUNTER — HOME CARE VISIT (OUTPATIENT)
Dept: HOME HEALTH SERVICES | Facility: HOME HEALTH | Age: 89
End: 2024-11-26
Payer: MEDICARE

## 2024-11-26 LAB
ACID FAST STN SPEC: NORMAL
MYCOBACTERIUM SPEC CULT: NORMAL

## 2024-11-29 NOTE — DISCHARGE SUMMARY
Inpatient Discharge Summary      Admitting Provider: Alana Kwan MD Discharge Provider: No att. 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    Shoulder arthritis    Shortness of breath    Second degree burn of hand    Seasonal allergic rhinitis due to pollen    Sacroiliitis (CMS-HCC)    Retinal drusen    Urinary tract infection    Punctate keratitis    Pseudophakia    Primary malignant neoplasm of vocal cord (Multi)    Presbyopia    Post-menopausal bleeding    Peripheral edema    Pelvic floor weakness    Overweight    Other hyperlipidemia    Nonrheumatic aortic valve insufficiency    Nocturia    Myopia    Murmur    Macular puckering of retina    Lumbosacral radiculopathy at L4    Lumbar disc narrowing    LPRD (laryngopharyngeal reflux disease)    Lower back pain    Loose stools    Limb pain    Irritable bowel syndrome    Irregular menstrual cycle    Increased bowel frequency    Hyponatremia    History of primary malignant neoplasm of lung    History of hypertension    History of hypercholesterolemia    History of elevated lipids    History of anemia    Glottic web of larynx    GERD (gastroesophageal reflux disease)    Fecal smearing    Familial hypercholesterolemia    External hemorrhoids    Elevated liver enzymes    Dysuria    Dysphonia    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 bleeding    Dry eye syndrome    Microscopic colitis    Diverticulitis of colon    Diarrhea    Cystitis    Cough    CKD (chronic kidney disease) stage 4,  GFR 15-29 ml/min (Multi)    Stage 3 chronic kidney disease (Multi)    Chronic insomnia    Chondrocalcinosis of knee    Degeneration of lumbar intervertebral disc    Carcinoma in situ of larynx    BPV (benign positional vertigo)    Primary hypertension    Bee sting    Atrophic vaginitis    Asthma    Arthritis of left knee    Anxiety    Anemia    Allergic rhinitis    Acute bronchitis    Abdominal pain    Abnormal lung sounds    Pulmonary fibrosis, unspecified (Multi)    Pleural effusion        Pleural effusion [J90]  Discharge Disposition  Home  DETAILS OF HOSPITAL STAY   Presenting Problem  Pleural effusion    Hospital Course    Operative Procedures Performed    Treatments:  .  Consults: pulmonary/intensive care and IR  Procedures: thoracentesis positiveadenocarcinoma    Outpatient Follow-Up  Future Appointments   Date Time Provider Department Center   12/4/2024  8:00 AM Oanh Cuba RN [facility]   12/17/2024  3:40 PM Court Jerome MD MPH [id number] [facility]       Test Results Pending at Discharge  Pending Labs       Order Current Status    AFB Culture/Smear Preliminary result                                  Discharge Medications     New Medications   Discharge Medication List as of 10/31/2024  9:29 AM           Medication given & discontinued during hospitalization   Medications Discontinued During This Encounter   Medication Reason    diphenhydrAMINE (Sominex) 25 mg tablet Entered in Error    ferrous sulfate, 325 mg ferrous sulfate, tablet Entered in Error    spironolactone (Aldactone) 25 mg tablet Stop Taking at Discharge    cephalexin (Keflex) 250 mg capsule Stop Taking at Discharge    fluconazole (Diflucan) 150 mg tablet Stop Taking at Discharge    amoxicillin (Amoxil) 500 mg capsule Stop Taking at Discharge    benzocaine-menthol (Cepastat Sore Throat) lozenge 1 lozenge Patient Discharge    heparin (porcine) injection 5,000 Units Patient Discharge    polyethylene glycol (Glycolax, Miralax) packet 17 g Patient  Discharge    spironolactone (Aldactone) tablet 25 mg Patient Discharge    mirtazapine (Remeron) tablet 7.5 mg Patient Discharge    furosemide (Lasix) tablet 20 mg Patient Discharge    famotidine (Pepcid) tablet 20 mg Patient Discharge    DULoxetine (Cymbalta) DR capsule 30 mg Patient Discharge    cholecalciferol (Vitamin D-3) tablet 2,000 Units Patient Discharge    cetirizine (ZyrTEC) tablet 10 mg Patient Discharge    atorvastatin (Lipitor) tablet 40 mg Patient Discharge      Discharge Medication List as of 10/31/2024  9:29 AM        STOP taking these medications       amoxicillin (Amoxil) 500 mg capsule Comments:   Reason for Stopping:         cephalexin (Keflex) 250 mg capsule Comments:   Reason for Stopping:         fluconazole (Diflucan) 150 mg tablet Comments:   Reason for Stopping:         spironolactone (Aldactone) 25 mg tablet Comments:   Reason for Stopping:                   Medication that have Changed   Discharge Medication List as of 10/31/2024  9:29 AM              Discharge Medications      Your medication list        CHANGE how you take these medications        Instructions Last Dose Given Next Dose Due   estradiol 0.01 % (0.1 mg/gram) vaginal cream  Commonly known as: Estrace  What changed:   how much to take  how to take this  when to take this      Place a pea sized amount vaginally 3 times a week              CONTINUE taking these medications        Instructions Last Dose Given Next Dose Due   acetaminophen 650 mg ER tablet  Commonly known as: Tylenol 8 HOUR           albuterol 90 mcg/actuation inhaler      Inhale 2 puffs every 4 hours if needed for shortness of breath.       atorvastatin 40 mg tablet  Commonly known as: Lipitor      TAKE 1 TABLET BY MOUTH EVERY DAY AS DIRECTED       cetirizine 10 mg tablet  Commonly known as: ZyrTEC           cholecalciferol 50 mcg (2,000 unit) capsule  Commonly known as: Vitamin D-3           clobetasol 0.05 % cream  Commonly known as: Temovate            cranberry fruit 465 mg capsule           DULoxetine 30 mg DR capsule  Commonly known as: Cymbalta      Take 1 capsule (30 mg) by mouth once daily. Do not crush or chew.       famotidine 20 mg tablet  Commonly known as: Pepcid           furosemide 20 mg tablet  Commonly known as: Lasix           inhalational spacing device inhaler      Use as directed with inhalers       mirabegron 50 mg tablet extended release 24 hr 24 hr tablet  Commonly known as: Myrbetriq      Take 1 tablet (50 mg) by mouth once daily.       mirtazapine 7.5 mg tablet  Commonly known as: Remeron                  STOP taking these medications      amoxicillin 500 mg capsule  Commonly known as: Amoxil        cephalexin 250 mg capsule  Commonly known as: Keflex        fluconazole 150 mg tablet  Commonly known as: Diflucan        spironolactone 25 mg tablet  Commonly known as: Aldactone                  Outpatient Follow-Up  Future Appointments   Date Time Provider Department [place]   12/4/2024  8:00 AM Oanh Cuba RN [facility]   12/17/2024  3:40 PM Court Jerome MD MPH [id number] [facility]  [address] [place] [address], [address] [place] [address]  Office Phone [phone]

## 2024-12-04 ENCOUNTER — HOME CARE VISIT (OUTPATIENT)
Dept: HOME HEALTH SERVICES | Facility: HOME HEALTH | Age: 89
End: 2024-12-04
Payer: MEDICARE

## 2024-12-04 LAB
ACID FAST STN SPEC: NORMAL
MYCOBACTERIUM SPEC CULT: NORMAL

## 2024-12-04 PROCEDURE — G0299 HHS/HOSPICE OF RN EA 15 MIN: HCPCS | Mod: HHH

## 2024-12-05 VITALS
HEART RATE: 66 BPM | OXYGEN SATURATION: 94 % | SYSTOLIC BLOOD PRESSURE: 110 MMHG | RESPIRATION RATE: 16 BRPM | TEMPERATURE: 98.6 F | BODY MASS INDEX: 22.78 KG/M2 | WEIGHT: 109.01 LBS | DIASTOLIC BLOOD PRESSURE: 70 MMHG

## 2024-12-05 ASSESSMENT — PAIN SCALES - PAIN ASSESSMENT IN ADVANCED DEMENTIA (PAINAD)
NEGVOCALIZATION: 0 - NONE.
BREATHING: 0
CONSOLABILITY: 0
FACIALEXPRESSION: 0 - SMILING OR INEXPRESSIVE.
TOTALSCORE: 0
NEGVOCALIZATION: 0
FACIALEXPRESSION: 0
BODYLANGUAGE: 0
CONSOLABILITY: 0 - NO NEED TO CONSOLE.
BODYLANGUAGE: 0 - RELAXED.

## 2024-12-05 ASSESSMENT — ENCOUNTER SYMPTOMS
DENIES PAIN: 1
CHANGE IN APPETITE: UNCHANGED
OCCASIONAL FEELINGS OF UNSTEADINESS: 0
LAST BOWEL MOVEMENT: 67178
FATIGUE: 1
PERSON REPORTING PAIN: PATIENT
APPETITE LEVEL: FAIR

## 2024-12-06 ENCOUNTER — TELEMEDICINE (OUTPATIENT)
Dept: UROLOGY | Facility: CLINIC | Age: 89
End: 2024-12-06
Payer: MEDICARE

## 2024-12-06 DIAGNOSIS — N39.41 URGE URINARY INCONTINENCE: ICD-10-CM

## 2024-12-06 DIAGNOSIS — N39.0 CHRONIC UTI: Primary | ICD-10-CM

## 2024-12-06 DIAGNOSIS — N95.2 VAGINAL ATROPHY: ICD-10-CM

## 2024-12-06 DIAGNOSIS — R10.2 PELVIC PAIN: ICD-10-CM

## 2024-12-06 DIAGNOSIS — N81.89 PELVIC FLOOR WEAKNESS: ICD-10-CM

## 2024-12-06 RX ORDER — MIRABEGRON 50 MG/1
50 TABLET, FILM COATED, EXTENDED RELEASE ORAL DAILY
Qty: 90 TABLET | Refills: 3 | Status: SHIPPED | OUTPATIENT
Start: 2024-12-06 | End: 2025-12-06

## 2024-12-06 NOTE — PATIENT INSTRUCTIONS
You have been provided a standing order for urinalysis and urine culture. Should you have any UTI-like symptoms, please present immediately to any [facility] lab to drop off a urine sample.      Please continue to utilize your vaginal estrogen 3 times a week.     You have been provided a standing order for urinalysis and urine culture. Should you have any UTI-like symptoms, please present immediately to any [facility] lab to drop off a urine sample.

## 2024-12-06 NOTE — PROGRESS NOTES
Subjective   Patient ID: Kajal Null is a 89 y.o. female who presents for No chief complaint on file.  Today's visit was done virtually after appropriate consent from the patient. Virtual or Telephone Consent     An interactive audio  telecommunication system which permits real time communications between the patient at home and provider (at the distant site) was utilized to provide this telehealth service.  18 minutes were spent discussing the patients concerns and coordinating care    HPI  This visit was performed through telemedicine  88-year-old with history of recurrent urinary tract infections, urinary frequency and urgency, vaginal atrophy, and pelvic floor weakness and pain.     She has started utilizing her Myrbetriq and requires a refill of her prescription.  She uses an [employer] procurement agency and requires a written prescription.  She continues to have frequency every 3 hours during the day. At night she notes 1 episode of nocturia but denies any enuresis. She denies any leaking with laughing, coughing and sneezing. She notes that her urine is cloudy.  She denies any dysuria or worsening frequency.    She denies any vaginal complaints, no abnormal bleeding or discharge. She is utilizing the vaginal estrogen cream. She does not have vaginal irritation today.     She denies any bowel related complaints, no fecal or flatal incontinence.    She has a stage 4 CKD monitored closely. She is also on chemotherapy for her lung cancer.  She has required recent thoracentesis.    She has no other complaints.    From Previous note   This visit was performed through telemedicine  88-year-old with history of recurrent urinary tract infections, urinary frequency and urgency, vaginal atrophy, and pelvic floor weakness and pain.    The patient had a fall with injuries  to her hip.    She has a stage 4 CKD monitored closely.    She continues to note urinary urgency and frequency. She notes 0-1 episodes of nocturia  but denies any enuresis. She voids 4 times during the day with rare episodes of incontinence. She has been utilizing Myrbetriq and is cost prohibitive at $300. The patient denies any UTI like symptoms today, she continues to utilize low cephalexin therapy,  cranberry extract  tablets for prevention of UTI.     She denies any vaginal complaints, no abnormal bleeding or discharge. She is utilizing the vaginal estrogen cream.     She denies any bowel related complaints, no fecal or flatal incontinence.    She has no other complaints.    From Previous note  This visit was performed through telemedicine  88-year-old with history of recurrent urinary tract infections, urinary frequency and urgency, vaginal atrophy, and pelvic floor weakness and pain.     Unfortunate the patient has had several urinary tract infections beginning and April, July, August, and December.  Recent E. coli has been resistant to trimethoprim and she was recently changed to daily cephalexin.  However she calls today with concerns regarding her medications.  She is continuing her vaginal estrogen therapy, cranberry tablets, and denies any vaginal concerns.     She is a known case of colitis but denies any bowel related complaints, no fecal or flatal incontinence.     She has no other complaints.        From previous note   The following visit was performed to telemedicine  87-year-old with history of recurrent urinary tract infections, urinary frequency and urgency presenting for follow-up.     The patient was noted to have a urinary tract infection roughly 1 week ago. She is utilizing ciprofloxacin and is having difficulty taking this medication due to concomitant use of antacids. She denies any issues with the medication but has 3 more days of the Cipro. She is not using her Myrbetriq as it has become cost prohibitive but wishes to try this for 1 month to determine efficacy.      She is no longer using her cranberry extract tablets, vaginal  "estrogen therapy, and d-mannose therapy.     She does continue to note 2-3 episodes of nocturia. She is taking a diuretic in the morning due to worsening swelling of her lower extremities. She is following up with her nephrologist, cardiologist, and primary care physician regarding this.     She has no other complaints.     From previous note  Due to the Covid epidemic, the following visit was performed telemedicine.     85-year-old presenting for follow-up with history of recurrent urinary tract infections, urinary frequency and urgency.     The patient has completed 6 months of 250 mg cephalexin. She denies any UTI like symptoms today. She is utilizing her vaginal estrogen cream 3 times weekly. She is utilizing d-mannose and cranberry extract tablets. The patient was noted to have a urinary tract infection in July 2019. She's been disease-free since that time.      She is very satisfied with her present Myrbetric 50 mg. she has changed her utilization of this medication to take it at night and has noted near complete resolution of her nocturia complaints. She denies enuresis. She denies any complaints with her daytime urgency or frequency. She is overall very satisfied with this medication. She does wish for to be less expensive. She is spending $40 monthly for this medication.     She has no other complaints.        From previous note:  85yo referred by Dr. Carballo for frequent UTI and urinary frequency and urgency.      She was last seen by Dr. Carballo in December 2018 and more recently by Maru Walker in July 2019. She has taken Keflex 250mg daily for UTI suppression in the past. She is currently on Keflex for treatment of acute UTI. She was restarted on vaginal estrogen cream in July 2019 as well.     She started to experience dysuria, nocturia x 4 and urgency with small volume urine leakage (\"just a few drops\") at night a few weeks ago. She denies any baseline urgency, frequency or urinary incontinence when she is " not infected.     She sees a Nephrologist for CKD. She drinks one cup of coffee in the morning, two glasses of water daily. She will work on increasing her water intake.     She takes budesonide for microscopic colitis. She currently has four soft BM daily. No blood or mucous per rectum. No constipation. She admits to bowel urgency from time to time, but no bowel incontinence. Of note, she is quite sensitive to antibiotics. She has experienced severe diarrhea with Bactrim and nitrofurantoin in the past. She has done well on Keflex. .      UA today = trace protein, trace leukocytes  PVR = 38cc     UTI history:   8/5/19 = inconclusive/ probable contamination  7/25/19 = +E coli (treated with Keflex 500mg BID x 7 days)  12/21/18 = +Klebsiella pneumonia (treated with Bactrim DScaused severe diarrhea and macrobid  {also caused severe diarrhea  3/1/18 = no growth  2/8/18 = no growth      Review of Systems    Objective   Physical Exam  Visit performed to telemedicine    Provider Impressions     89-year-old with history of recurrent urinary tract infections, urinary frequency and urgency, vaginal atrophy, and pelvic floor weakness and pain with recurrent adenocarcinoma of the lung.     #1 we again discussed treatment for chronic urinary tract infections. She has  completed 12 months of cephalexin therapy. Unfortunately she has had E. coli and Klebsiella UTIs on 3 occasions since April 2023 but has been UTI free since December 2023.  We discussed the safety of stopping this medication now.  We again discussed vaginal hygiene.  We discussed continuing her vaginal estrogen therapy 3 times a week moving forward.  She was previously provided a standing order for urinalysis and urine culture.  She has noted some cloudy urine but no UTI-like symptoms.  She will drop off a urine sample at her earliest convenience.     #2 the patient was previously noted to have a weak and and painful pelvic floor. We again discussed the benefits of  pelvic floor physical therapy. She has been unable to follow-up.     #3 we again discussed her urinary urgency complaints. We again discussed the AUA OAB care pathway including first, second, third line therapies. The patient has utilized oxybutynin with significant dry mouth complaints as well as bothersome dry mouth complaints with trospium therapy.  She was receiving her Myrbetriq from [facility] and is overall satisfied with this therapy.  We discussed the importance of timed voiding.  We have previously discussed third line options including PTNS, Botox, and InterStim.  She would be most interested in proceeding with Botox moving forward.  A new prescription for Myrbetriq will be sent to her by mail as she is receiving her medication through international means.       #4 We again discussed the safety and efficacy of vaginal estrogen therapy. She will continue this 3 times a week moving forward.     #5 she is continuing close follow-up with her [facility] doctors regarding her recent diagnosis of recurrent adenocarcinoma of the lung and stage IV kidney failure.     #6 patient will follow-up on an as-needed basis moving forward.     JUDIE Morton MD        Scribe Attestation  By signing my name below, Lisa BHARDWAJ Scribe   attest that this documentation has been prepared under the direction and in the presence of Aj Morton MD.

## 2024-12-09 ENCOUNTER — LAB (OUTPATIENT)
Dept: LAB | Facility: LAB | Age: 89
End: 2024-12-09
Payer: MEDICARE

## 2024-12-09 DIAGNOSIS — N39.0 CHRONIC UTI: ICD-10-CM

## 2024-12-09 LAB
APPEARANCE UR: CLEAR
BILIRUB UR STRIP.AUTO-MCNC: NEGATIVE MG/DL
COLOR UR: COLORLESS
GLUCOSE UR STRIP.AUTO-MCNC: NORMAL MG/DL
KETONES UR STRIP.AUTO-MCNC: NEGATIVE MG/DL
LEUKOCYTE ESTERASE UR QL STRIP.AUTO: NEGATIVE
NITRITE UR QL STRIP.AUTO: NEGATIVE
PH UR STRIP.AUTO: 5 [PH]
PROT UR STRIP.AUTO-MCNC: NEGATIVE MG/DL
RBC # UR STRIP.AUTO: NEGATIVE /UL
SP GR UR STRIP.AUTO: 1.01
UROBILINOGEN UR STRIP.AUTO-MCNC: NORMAL MG/DL

## 2024-12-09 PROCEDURE — 81003 URINALYSIS AUTO W/O SCOPE: CPT

## 2024-12-10 LAB — HOLD SPECIMEN: NORMAL

## 2024-12-11 LAB
ACID FAST STN SPEC: NORMAL
MYCOBACTERIUM SPEC CULT: NORMAL

## 2024-12-16 DIAGNOSIS — E78.2 MIXED HYPERLIPIDEMIA: ICD-10-CM

## 2024-12-17 ENCOUNTER — APPOINTMENT (OUTPATIENT)
Dept: PRIMARY CARE | Facility: CLINIC | Age: 89
End: 2024-12-17
Payer: MEDICARE

## 2024-12-17 VITALS
DIASTOLIC BLOOD PRESSURE: 70 MMHG | WEIGHT: 110 LBS | BODY MASS INDEX: 22.99 KG/M2 | SYSTOLIC BLOOD PRESSURE: 132 MMHG | OXYGEN SATURATION: 98 % | HEART RATE: 69 BPM

## 2024-12-17 DIAGNOSIS — R63.0 DECREASED APPETITE: Primary | ICD-10-CM

## 2024-12-17 DIAGNOSIS — Z86.79 HISTORY OF HYPERTENSION: ICD-10-CM

## 2024-12-17 DIAGNOSIS — K21.9 GASTROESOPHAGEAL REFLUX DISEASE, UNSPECIFIED WHETHER ESOPHAGITIS PRESENT: ICD-10-CM

## 2024-12-17 DIAGNOSIS — N18.4 CKD (CHRONIC KIDNEY DISEASE) STAGE 4, GFR 15-29 ML/MIN (MULTI): ICD-10-CM

## 2024-12-17 PROCEDURE — 99213 OFFICE O/P EST LOW 20 MIN: CPT | Performed by: STUDENT IN AN ORGANIZED HEALTH CARE EDUCATION/TRAINING PROGRAM

## 2024-12-17 PROCEDURE — 3078F DIAST BP <80 MM HG: CPT | Performed by: STUDENT IN AN ORGANIZED HEALTH CARE EDUCATION/TRAINING PROGRAM

## 2024-12-17 PROCEDURE — 1159F MED LIST DOCD IN RCRD: CPT | Performed by: STUDENT IN AN ORGANIZED HEALTH CARE EDUCATION/TRAINING PROGRAM

## 2024-12-17 PROCEDURE — G2211 COMPLEX E/M VISIT ADD ON: HCPCS | Performed by: STUDENT IN AN ORGANIZED HEALTH CARE EDUCATION/TRAINING PROGRAM

## 2024-12-17 PROCEDURE — 3075F SYST BP GE 130 - 139MM HG: CPT | Performed by: STUDENT IN AN ORGANIZED HEALTH CARE EDUCATION/TRAINING PROGRAM

## 2024-12-17 RX ORDER — FAMOTIDINE 20 MG/1
20 TABLET, FILM COATED ORAL NIGHTLY
Qty: 90 TABLET | Refills: 3 | Status: SHIPPED | OUTPATIENT
Start: 2024-12-17 | End: 2025-12-17

## 2024-12-17 RX ORDER — SPIRONOLACTONE 25 MG/1
25 TABLET ORAL DAILY
Qty: 90 TABLET | Refills: 3 | Status: SHIPPED | OUTPATIENT
Start: 2024-12-17 | End: 2025-12-17

## 2024-12-17 RX ORDER — ATORVASTATIN CALCIUM 40 MG/1
40 TABLET, FILM COATED ORAL DAILY
Qty: 90 TABLET | Refills: 1 | Status: SHIPPED | OUTPATIENT
Start: 2024-12-17 | End: 2024-12-17 | Stop reason: WASHOUT

## 2024-12-17 RX ORDER — SPIRONOLACTONE 25 MG/1
25 TABLET ORAL DAILY
COMMUNITY
End: 2024-12-17 | Stop reason: SDUPTHER

## 2024-12-17 RX ORDER — MIRTAZAPINE 7.5 MG/1
7.5 TABLET, FILM COATED ORAL NIGHTLY
Qty: 90 TABLET | Refills: 3 | Status: SHIPPED | OUTPATIENT
Start: 2024-12-17 | End: 2025-12-17

## 2024-12-17 NOTE — PATIENT INSTRUCTIONS
Thank you for coming today Shirley!    Please get your COVID vaccine some time in January.     I am not worried about your thyroid test, it was a little off while you were sick which is expected. We can recheck with the next time you get blood work.     Let me know if you think you would want a nebulizer to use albuterol as a mist.    It is ok to stop taking atorvastatin.    Your lungs sound pretty good today!    Let's plan for a follow up in March or sooner if needed!

## 2024-12-18 LAB
ACID FAST STN SPEC: NORMAL
MYCOBACTERIUM SPEC CULT: NORMAL

## 2024-12-19 DIAGNOSIS — N39.0 CHRONIC UTI: ICD-10-CM

## 2024-12-30 ENCOUNTER — HOME CARE VISIT (OUTPATIENT)
Dept: HOME HEALTH SERVICES | Facility: HOME HEALTH | Age: 89
End: 2024-12-30
Payer: MEDICARE

## 2024-12-30 ASSESSMENT — ACTIVITIES OF DAILY LIVING (ADL)
OASIS_M1830: 00
HOME_HEALTH_OASIS: 00

## 2024-12-31 DIAGNOSIS — N39.0 CHRONIC UTI: ICD-10-CM

## 2024-12-31 RX ORDER — CEPHALEXIN 250 MG/1
CAPSULE ORAL
Qty: 90 CAPSULE | Refills: 2 | Status: SHIPPED | OUTPATIENT
Start: 2024-12-31 | End: 2024-12-31 | Stop reason: SDUPTHER

## 2024-12-31 RX ORDER — CEPHALEXIN 250 MG/1
CAPSULE ORAL
Qty: 90 CAPSULE | Refills: 3 | Status: SHIPPED | OUTPATIENT
Start: 2024-12-31

## 2025-04-03 ENCOUNTER — OFFICE VISIT (OUTPATIENT)
Dept: OBSTETRICS AND GYNECOLOGY | Facility: CLINIC | Age: 89
End: 2025-04-03
Payer: MEDICARE

## 2025-04-03 VITALS — SYSTOLIC BLOOD PRESSURE: 131 MMHG | DIASTOLIC BLOOD PRESSURE: 86 MMHG | HEART RATE: 82 BPM

## 2025-04-03 DIAGNOSIS — N95.2 VAGINAL ATROPHY: ICD-10-CM

## 2025-04-03 DIAGNOSIS — N39.0 RECURRENT UTI: ICD-10-CM

## 2025-04-03 DIAGNOSIS — N32.81 OVERACTIVE BLADDER: Primary | ICD-10-CM

## 2025-04-03 PROCEDURE — 3075F SYST BP GE 130 - 139MM HG: CPT | Performed by: OBSTETRICS & GYNECOLOGY

## 2025-04-03 PROCEDURE — 1159F MED LIST DOCD IN RCRD: CPT | Performed by: OBSTETRICS & GYNECOLOGY

## 2025-04-03 PROCEDURE — 99204 OFFICE O/P NEW MOD 45 MIN: CPT | Performed by: OBSTETRICS & GYNECOLOGY

## 2025-04-03 PROCEDURE — 1126F AMNT PAIN NOTED NONE PRSNT: CPT | Performed by: OBSTETRICS & GYNECOLOGY

## 2025-04-03 PROCEDURE — 99214 OFFICE O/P EST MOD 30 MIN: CPT | Performed by: OBSTETRICS & GYNECOLOGY

## 2025-04-03 PROCEDURE — 3079F DIAST BP 80-89 MM HG: CPT | Performed by: OBSTETRICS & GYNECOLOGY

## 2025-04-03 RX ORDER — ESTRADIOL 0.1 MG/G
CREAM VAGINAL
Qty: 42.5 G | Refills: 2 | Status: SHIPPED | OUTPATIENT
Start: 2025-04-03

## 2025-04-03 ASSESSMENT — PAIN SCALES - GENERAL: PAINLEVEL_OUTOF10: 0-NO PAIN

## 2025-04-03 NOTE — PROGRESS NOTES
Urogynecology  Provider:  Elena Chapa MD  [phone]              ASSESSMENT AND PLAN:   89-year-old female being assessed for recurrent UTIs, vaginal atrophy, lung cancer, kidney issues, and dermatological concerns.    Diagnoses:  #1 Recurrent UTI  #2 Vaginal atrophy  #3 Lung cancer  #4 Kidney issues  #5 Dermatological concerns    Plan:  Recurrent UTI, vaginal atrophy  - She has not had a UTI since December 2023.   - She is currently off Keflex as per her oncologist's recommendation.  - Continue using tv estrogen 2 nights a week to maintain vaginal acidity and prevent UTIs.  - Reinforced the proper application of estrogen cream, ensuring it is applied thoroughly to prevent it from falling out.  - Standing orders for urine cultures have been placed to monitor for any future UTIs.  - She has been provided with urine culture cups for home collection if needed.  - POPQ-: Aa: -2, C: -8, TVL: 10, Ap: -3  - Rx refill for tv estrogen sent to patient's preferred pharmacy.    2. Lung cancer  - Continue current oncological management as directed by her oncologist.  - No changes to the current tx plan for lung cancer were discussed during this visit.    3. Kidney issues  - She mentioned a major kidney problem but did not provide specific details.  - No specific plan was discussed for this issue during the visit.    4. Dermatological concerns  - She has a hx of eczema, which was previously misdiagnosed as a vascular issue.  - No specific tx plan discussed during this visit.    Follow-up in 3-4 months with Dr. Chapa to reassess her condition and the management plan. She is advised to contact the clinic if she experiences recurrent UTIs (2 in 6 months or 3 in a year) or any other concerns.    Scribe Attestation  By signing my name below, IJovany Scribe, attest that this documentation has been prepared under the direction and in the presence of Elena Chapa MD on 04/03/2025 at 8:36 AM.    Agree with  above. I Dr. Chapa, personally performed the services described in the documentation which was scribed virtually and confirm it is both complete and accurate.  Elena Chapa MD      Problem List Items Addressed This Visit    None          I spent a total of 45 minutes in face to face and non face to face time.      Elena Chapa MD              HISTORY OF PRESENT ILLNESS:     Referred by Dr. Morton for recurrent UTIs and OAB. Last saw him 12/2024    88-year-old with history of recurrent urinary tract infections, urinary frequency and urgency, vaginal atrophy, and pelvic floor weakness and pain.     She has started utilizing her Myrbetriq and requires a refill of her prescription.  She uses an international procurement agency and requires a written prescription.  She continues to have frequency every 3 hours during the day. At night she notes 1 episode of nocturia but denies any enuresis. She denies any leaking with laughing, coughing and sneezing. She notes that her urine is cloudy.  She denies any dysuria or worsening frequency.     She denies any vaginal complaints, no abnormal bleeding or discharge. She is utilizing the vaginal estrogen cream. She does not have vaginal irritation today.      She denies any bowel related complaints, no fecal or flatal incontinence.     She has a stage 4 CKD monitored closely. She is also on chemotherapy for her lung cancer.  She has required recent thoracentesis.    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  - She was previously on Keflex but has been off it for about 4-5 days.  - She uses estrogen cream 2 nights a week and is considering a refill.  - She is undergoing a period  of not taking any medications to allow for testing and evaluation by the oncologist.  - She reports a major kidney problem and a history of eczema, which causes itching.  - She mentions leg swelling and past concern for vasculitis, which was ruled out by a dermatologist.    Additional History:  - She is in the process of treatment for lung cancer and is under the care of oncologist Sushant Cantu at [facility].  - She is not currently on chemotherapy pills as per the oncologist's advice.          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    Change in bowel habit 09/13/2016    Increased bowel frequency    Chronic kidney disease     Chronic kidney disease, stage 3 unspecified (Multi) 06/12/2022    Chronic kidney disease (CKD), stage III (moderate)    Clotting disorder (Multi)     Diarrhea, unspecified 10/09/2017    Diarrhea, unspecified type    Diverticulitis of large intestine without perforation or abscess without bleeding 10/11/2017    Diverticulitis of colon    Diverticulosis of large intestine without perforation or abscess without bleeding 10/09/2017    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 bleeding    Dysphonia 04/28/2016    Dysphonia    Eczema     Essential (primary) hypertension 01/26/2019    Benign essential hypertension    Gastro-esophageal reflux disease without esophagitis 02/02/2020    GERD (gastroesophageal reflux disease)    Heart murmur     Nonrheumatic aortic (valve) insufficiency 12/07/2015    Nonrheumatic aortic valve insufficiency    Other conditions influencing health status     Osteoarthritis    Other  fecal abnormalities 07/28/2015    Loose stools    Other specified noninflammatory disorders of vagina 07/17/2020    Vaginal irritation    Other specified noninflammatory disorders of vagina 05/25/2022    Vaginal irritation    Other specified symptoms and signs involving the circulatory and respiratory systems 04/28/2016    Abnormal lung sounds    Personal history of diseases of the blood and blood-forming organs and certain disorders involving the immune mechanism     History of anemia    Personal history of other diseases of the circulatory system     History of hypertension    Personal history of other diseases of the circulatory system     History of mitral valve prolapse    Personal history of other diseases of the digestive system     History of esophageal reflux    Personal history of other diseases of the respiratory system 12/19/2013    Personal history of asthma    Personal history of other endocrine, nutritional and metabolic disease 12/19/2013    History of hyperlipidemia    Primary osteoarthritis, unspecified shoulder 12/08/2014    Shoulder arthritis    Radiculopathy, lumbosacral region 05/07/2019    Lumbosacral radiculopathy at L4    Unilateral primary osteoarthritis, left knee 06/05/2017    Arthritis of left knee    Urinary tract infection     Varicella           Past Surgical History:       Past Surgical History:   Procedure Laterality Date    ADENOIDECTOMY      CATARACT EXTRACTION  09/04/2015    Cataract Surgery    COLONOSCOPY  09/04/2015    Complete Colonoscopy    OTHER SURGICAL HISTORY  09/04/2015    Direct Laryngoscopy With Stripping Of Vocal Cords    OTHER SURGICAL HISTORY  09/04/2015    Vocal Cordectomy    SHOULDER SURGERY  09/24/2013    Shoulder Surgery    TONSILLECTOMY  09/04/2015    Tonsillectomy         Medications:       Prior to Admission medications    Medication Sig Start Date End Date Taking? Authorizing Provider   acetaminophen (Tylenol 8 HOUR) 650 mg ER tablet Take 1 tablet (650 mg)  by mouth every 8 hours. Do not crush, chew, or split.    Historical Provider, MD   albuterol 90 mcg/actuation inhaler Inhale 2 puffs every 4 hours if needed for shortness of breath. 10/15/24   Court Jerome MD MPH   cephalexin (Keflex) 250 mg capsule TAKE 1 CAPSULE BY MOUTH EVERY DAY 12/31/24   Aj Morton MD   cetirizine (ZyrTEC) 10 mg tablet Take 1 tablet (10 mg) by mouth once daily.    Historical Provider, MD   cholecalciferol (Vitamin D-3) 50 mcg (2,000 unit) capsule Take 1 capsule (50 mcg) by mouth early in the morning.. 4/16/14   Historical Provider, MD   clobetasol (Temovate) 0.05 % cream Apply 1 Application topically 2 times a day as needed (irritation).    Historical Provider, MD   cranberry fruit 465 mg capsule Take 465 mg by mouth early in the morning..    Historical Provider, MD   DULoxetine (Cymbalta) 30 mg DR capsule Take 1 capsule (30 mg) by mouth once daily. Do not crush or chew. 10/9/24 10/9/25  Court Jerome MD MPH   estradiol (Estrace) 0.01 % (0.1 mg/gram) vaginal cream Place a pea sized amount vaginally 3 times a week  Patient taking differently: Insert 0.25 Applicatorfuls (1 g) into the vagina 3 (three) times a week. Place a pea sized amount vaginally 3 times a week 7/25/24   Aj Morton MD   famotidine (Pepcid) 20 mg tablet Take 1 tablet (20 mg) by mouth once daily at bedtime. 12/17/24 12/17/25  Court Jerome MD MPH   furosemide (Lasix) 20 mg tablet Take 1 tablet (20 mg) by mouth once daily.    Historical Provider, MD   inhalational spacing device inhaler Use as directed with inhalers 10/21/24 10/21/25  Court Jerome MD MPH   mirabegron (Myrbetriq) 50 mg tablet extended release 24 hr 24 hr tablet Take 1 tablet (50 mg) by mouth once daily. 12/6/24 12/6/25  Aj Morton MD   mirtazapine (Remeron) 7.5 mg tablet Take 1 tablet (7.5 mg) by mouth once daily at bedtime. 12/17/24 12/17/25  Court Jerome MD MPH   osimertinib (Tagrisso) 80 mg tablet Take 1 tablet  (80 mg total) by mouth once daily. 11/22/24   Historical Provider, MD   spironolactone (Aldactone) 25 mg tablet Take 1 tablet (25 mg) by mouth once daily. 12/17/24 12/17/25  Court Jerome MD MPH        ROS  Review of Systems   All other systems reviewed and are negative.         PHYSICAL EXAM:      There were no vitals taken for this visit.     No LMP recorded. Patient is postmenopausal.      Declines chaperone for physical exam.    PVR= 18 ml by U/S    Well developed, well nourished, in no apparent distress.   Neurologic/Psychiatric:  Awake, Alert and Oriented times 3.  Affect normal. Normal cranial nerves  Pulm: breathing without effort  Sexual maturity: Shane stage V  Abd exam: soft, non-tender      GENITAL/URINARY:       External Genitalia:  The patient has normal appearing external genitalia, normal skenes and bartholins glands, and a normal hair distribution.  Her vulva is without lesions, erythema or discharge.  It is non-tender with appropriate sensation.     Urethral Meatus:  Size normal, Location normal, Lesions absent, Prolapse absent,      Urethra:  Fullness absent, Masses absent,      Bladder:  Fullness absent, Masses absent, Tenderness absent,      Vagina:  General appearance normal, Estrogen effect normal, Discharge absent, Lesions absent     Cervix: Normal, no discharge.     Anus/Perineum:  Lesions absent and Masses absent normal sphincter tone, no lesions  No Hemorrhoids, Normal Perineum      POP-Q:    Aa: -2       Ba:  C: -8   Gh:  Pb:  TVL: 10         Ap: -3 Bp:  D: -9             She does not have myofascial tenderness on exam.      Rectal exam: Normal.           Elena Chapa MD

## 2025-04-04 DIAGNOSIS — N39.0 RECURRENT UTI: ICD-10-CM

## 2025-04-11 DIAGNOSIS — N39.0 RECURRENT UTI: ICD-10-CM

## 2025-05-06 ENCOUNTER — APPOINTMENT (OUTPATIENT)
Dept: NEPHROLOGY | Facility: CLINIC | Age: 89
End: 2025-05-06
Payer: MEDICARE

## 2025-05-06 VITALS
BODY MASS INDEX: 22.78 KG/M2 | DIASTOLIC BLOOD PRESSURE: 72 MMHG | SYSTOLIC BLOOD PRESSURE: 133 MMHG | WEIGHT: 109 LBS | HEART RATE: 72 BPM | OXYGEN SATURATION: 95 %

## 2025-05-06 DIAGNOSIS — E55.9 VITAMIN D DEFICIENCY: ICD-10-CM

## 2025-05-06 DIAGNOSIS — D63.1 ANEMIA DUE TO STAGE 4 CHRONIC KIDNEY DISEASE: ICD-10-CM

## 2025-05-06 DIAGNOSIS — N18.4 CKD (CHRONIC KIDNEY DISEASE) STAGE 4, GFR 15-29 ML/MIN (MULTI): Primary | ICD-10-CM

## 2025-05-06 DIAGNOSIS — N18.4 ANEMIA DUE TO STAGE 4 CHRONIC KIDNEY DISEASE: ICD-10-CM

## 2025-05-06 NOTE — PROGRESS NOTES
Subjective   Kajal Null is a 89 y.o. female who presented today to discuss her stage 4 chronic kidney disease of somewhat unclear etiology.  When I first met her she had hyponatremia, suffered many acute kidney injury events, particularly as we would give her diuretics for lower extremity edema.  She has had frequent urinary tract infections, microscopic colitis, hypertension, she had a vocal cord malignancy in 2004, recurrences in 2005, 2017, 2023.  She has osteoarthritis, GERD and hemorrhoids.  She has typically lacked protein, we have contemplated a kidney biopsy but she has typically enjoyed some recovery.  But I am afraid she was diagnosed with lung cancer, she is on a tyrosine kinase inhibitor.  I have spoken with her oncologist, Dr. Izaguirre.  She had a skin rash, it was felt that it was eczema, not a vasculitis.  She is feeling fairly good, mild shortness of breath but denies nausea, vomiting, chest pain, or abdominal pain.  Her weight is fairly stable.    Her blood pressure is at goal today.    ROS  As in Subjective, all other ROS are negative    Objective     Vital signs    Visit Vitals  /72 (BP Location: Left arm, Patient Position: Sitting, BP Cuff Size: Adult)   Pulse 72      Vitals:    05/06/25 1351   Weight: 49.4 kg (109 lb)        Physical Exam  Constitutional:       Appearance: Normal appearance.   HENT:      Mouth/Throat:      Mouth: Mucous membranes are moist.   Eyes:      Extraocular Movements: Extraocular movements intact.      Pupils: Pupils are equal, round, and reactive to light.   Cardiovascular:      Rate and Rhythm: Regular rhythm.  Sys murmur     Heart sounds: S1 normal and S2 normal.   Pulmonary:      Breath sounds: Normal breath sounds.   Abdominal:      Comments: Soft, NT/ND, no masses, normal bowel sounds   Genitourinary:     Comments: No bhardwaj  Musculoskeletal:      No synovitis  Edema:  2+ right lower extremity edema, 1+ on the left.  Mild erythema, less warm  Skin:      "General: Skin is warm and dry.   Neurological:      General: No focal deficit present.      Mental Status: She is alert and oriented to person, place, and time.   Psychiatric:         Behavior: Behavior normal.      Meds  Current Medications[1]     Allergies  RX Allergies[2]     Results  Lab Results   Component Value Date    GLUCOSE 96 10/28/2024     (L) 10/28/2024    K 5.2 10/28/2024     10/28/2024    CO2 23 10/28/2024    ANIONGAP 14 10/28/2024    BUN 51 (H) 10/28/2024    CREATININE 1.98 (H) 10/28/2024    GFRF 26 (A) 08/22/2023    CALCIUM 9.7 10/28/2024    MG 2.10 12/28/2018     Lab Results   Component Value Date    .4 (H) 08/22/2023    CALCIUM 9.7 10/28/2024     No results found for: \"ALBUR\", \"[id number]\"         @LABALLVALUEIP(CREATININE:*)@  @LABALLVALUEIP(NA:*)@    Imaging results  === 10/25/24 ===    US THORACENTESIS    - Impression -  Uneventful thoracentesis, as detailed above: Right Pleural space, 500  mL    Performed and dictated at [facility].    Signed by: Lizbeth Escamilla 10/29/2024 1:12 PM  Dictation workstation:   [id number]     Assessment and Plan  Kajal Null has stage 4 chronic kidney disease, labs are slightly better as of late.  On April 15 creatinine 2.51 mg/dL, 2.74 in April 2, was as high as 3.74 on February 8. She has had a mild acidosis at times, electrolytes look good.  I am concerned with her anemia.  Dr. Ferrera is okay with an LASHON.  We will follow iron stores, she will need an erythropoietin, I have referred her to a hematologist, hoping that we can avoid another transfusion.  25 vitamin D and intact PTH look good last June, we will check those again.  No changes today.       Problem List Items Addressed This Visit       CKD (chronic kidney disease) stage 4, GFR 15-29 ml/min (Multi) - Primary    Relevant Orders    Referral To Hematology and Oncology    Follow Up In Nephrology    Vitamin D 25-Hydroxy,Total (for eval of Vitamin D " levels)    Parathyroid Hormone, Intact    Renal Function Panel    CBC and Auto Differential    Ferritin    Iron and TIBC    Anemia    Relevant Orders    Referral To Hematology and Oncology    Follow Up In Nephrology    Vitamin D 25-Hydroxy,Total (for eval of Vitamin D levels)    Parathyroid Hormone, Intact    Renal Function Panel    CBC and Auto Differential    Ferritin    Iron and TIBC     Other Visit Diagnoses         Vitamin D deficiency        Relevant Orders    Referral To Hematology and Oncology    Follow Up In Nephrology    Vitamin D 25-Hydroxy,Total (for eval of Vitamin D levels)    Parathyroid Hormone, Intact    Renal Function Panel    CBC and Auto Differential    Ferritin    Iron and TIBC           Killian Madrigal MD           [1]   Current Outpatient Medications:     acetaminophen (Tylenol 8 HOUR) 650 mg ER tablet, Take 1 tablet (650 mg) by mouth every 8 hours. Do not crush, chew, or split., Disp: , Rfl:     cholecalciferol (Vitamin D-3) 50 mcg (2,000 unit) capsule, Take 1 capsule (50 mcg) by mouth early in the morning.., Disp: , Rfl:     cranberry fruit 465 mg capsule, Take 465 mg by mouth early in the morning.., Disp: , Rfl:     furosemide (Lasix) 20 mg tablet, Take 1 tablet (20 mg) by mouth once daily. (Patient taking differently: Take 1 tablet (20 mg) by mouth 2 times daily (morning and late afternoon).), Disp: , Rfl:     osimertinib (Tagrisso) 80 mg tablet, Take 1 tablet (80 mg total) by mouth once daily. (Patient taking differently: Take 40 mg by mouth once daily.), Disp: , Rfl:     albuterol 90 mcg/actuation inhaler, Inhale 2 puffs every 4 hours if needed for shortness of breath. (Patient not taking: Reported on 5/6/2025), Disp: 18 g, Rfl: 2    cephalexin (Keflex) 250 mg capsule, TAKE 1 CAPSULE BY MOUTH EVERY DAY (Patient not taking: Reported on 5/6/2025), Disp: 90 capsule, Rfl: 3    cetirizine (ZyrTEC) 10 mg tablet, Take 1 tablet (10 mg) by mouth once daily., Disp: , Rfl:     clobetasol (Temovate)  0.05 % cream, Apply 1 Application topically 2 times a day as needed (irritation)., Disp: , Rfl:     DULoxetine (Cymbalta) 30 mg DR capsule, Take 1 capsule (30 mg) by mouth once daily. Do not crush or chew. (Patient not taking: Reported on 5/6/2025), Disp: 30 capsule, Rfl: 11    estradiol (Estrace) 0.01 % (0.1 mg/gram) vaginal cream, Place a pea sized amount vaginally 3 times a week, Disp: 42.5 g, Rfl: 2    famotidine (Pepcid) 20 mg tablet, Take 1 tablet (20 mg) by mouth once daily at bedtime., Disp: 90 tablet, Rfl: 3    inhalational spacing device inhaler, Use as directed with inhalers, Disp: 1 each, Rfl: 0    mirabegron (Myrbetriq) 50 mg tablet extended release 24 hr 24 hr tablet, Take 1 tablet (50 mg) by mouth once daily., Disp: 90 tablet, Rfl: 3    mirtazapine (Remeron) 7.5 mg tablet, Take 1 tablet (7.5 mg) by mouth once daily at bedtime., Disp: 90 tablet, Rfl: 3    spironolactone (Aldactone) 25 mg tablet, Take 1 tablet (25 mg) by mouth once daily. (Patient not taking: Reported on 5/6/2025), Disp: 90 tablet, Rfl: 3  [2]   Allergies  Allergen Reactions    Alendronate Other    Amoxicillin-Pot Clavulanate Diarrhea    Codeine GI intolerance, GI Upset and Hives    Dexlansoprazole Nausea/vomiting and Nausea Only    Diclofenac Diarrhea    House Dust Unknown    Mold Unknown    Nitrofurantoin GI Upset    Trimethoprim Diarrhea    Sulfa (Sulfonamide Antibiotics) Rash

## 2025-05-28 DIAGNOSIS — R35.0 URINE FREQUENCY: ICD-10-CM

## 2025-05-28 DIAGNOSIS — R82.90 CLOUDY URINE: ICD-10-CM

## 2025-05-28 DIAGNOSIS — R60.9 EDEMA, UNSPECIFIED TYPE: ICD-10-CM

## 2025-05-28 RX ORDER — FUROSEMIDE 20 MG/1
20 TABLET ORAL
Qty: 60 TABLET | Refills: 1 | Status: SHIPPED | OUTPATIENT
Start: 2025-05-28

## 2025-05-30 ENCOUNTER — TELEPHONE (OUTPATIENT)
Dept: OBSTETRICS AND GYNECOLOGY | Facility: CLINIC | Age: 89
End: 2025-05-30
Payer: MEDICARE

## 2025-05-30 LAB — BACTERIA UR CULT: NORMAL

## 2025-07-02 NOTE — PROGRESS NOTES
Urogynecology  Provider:  Elena Chapa MD  [phone]    ASSESSMENT AND PLAN:   89 year old female with Abdirahman, vaginal atrophy, kidney issues, constipation, and OAB. Comorbidities include: HTN, GERD, stage 4 CKD, hx of lung cancer, and personal hx of bilateral vocal cord SCC in situ.     Diagnoses:  #1 Recurrent UTI  #2 Vaginal atrophy  #3 Overactive bladder  #4 Lung cancer, on targeted therapy  #5 Constipation   #6 Epistaxis     Plan:  1. Abdirahman, vaginal atrophy  - Patient has not had a UTI within the past x2 years.   - We encouraged her to continue using transvaginal Estrace cream 2x/week for long-term UTI ppx.     2. OAB  - We reviewed the data that by age 60 around 40% of women experience OAB symptoms. We counseled on the etiology of overactive bladder with the neurological aging process and that OAB symptoms occur when the detrusor muscle contracts/squeezes allowing the feeling of urgency and frequency prior to the bladder actually being full enough to void (I.e. inappropriate bladder spasms) which can lead to UUI episodes.   - Previously tried Myrbetriq with failure to improve her OAB symptoms.   - Reviewed OAB management options such as trying a different medication such as Gemtesa, Trospium, or Solifenacin. She is amenable to this plan.   - Sent Rx of Vesicare 5mg to her preferred pharmacy with instructions to take 1 pill po daily. This medication will help improve bladder compliance by decreasing intensity of the bladder spasms thus improving urinary urgency. Discussed the drug profile being an anticholinergic and possible medication side effects including urinary retention, dry mouth, dry eyes, and constipation.    3. Constipation  - We advised her to start taking 1 capful of MiraLAX daily or daily Senna in addition to increasing fiber intake to 25-30 grams daily to help reduce constipation. Reviewed the mechanism of MiraLAX working by pulling water into the intestines to promote a more soft stool  consistency. She is amenable to this.     4. Epistaxis   - The patient reports having a severe episode of epistaxis x1 week ago described as hemorrhaging blood from her nose that ran down the back of her throat; she did not go to the ED but she told her oncologist about this episode.   - Patient was counseled that if an episodes of epistaxis occurs again to call her oncologist/ENT sooner than her scheduled follow up visit on 8/22/2025.   - Recommended she follow up with Dr. Santamaria in ENT to further evaluate.     Follow up in 8 weeks with Dr. Chapa for an OAB medication check.     Scribe Attestation  By signing my name below, I, Kermit Leggett, attest that this documentation has been prepared under the direction and in the presence of Elena Chapa MD on 07/03/2025 at 7:20 PM.     Agree with above. I Dr. Chapa, personally performed the services described in the documentation which was scribed virtually and confirm it is both complete and accurate.  Elena Chapa MD      Problem List Items Addressed This Visit    None  Visit Diagnoses         Uterovaginal prolapse    -  Primary    Relevant Orders    Measure post void residual    POCT UA Automated manually resulted      Recurrent UTI        Relevant Orders    Measure post void residual    POCT UA Automated manually resulted      Overactive bladder        Relevant Medications    solifenacin (Vesicare) 5 mg tablet                I spent a total of eConsult Time: 30 minutes in face to face and non face to face time.        Elena Chapa MD        HISTORY OF PRESENT ILLNESS:   89 year old female presenting in follow up for recurrent UTI and OAB.     Records Review:   - Last visit 4/30/25  89-year-old female being assessed for recurrent UTIs, vaginal atrophy, lung cancer, kidney issues, and dermatological concerns.     Diagnoses:  #1 Recurrent UTI  #2 Vaginal atrophy  #3 Lung cancer  #4 Kidney issues  #5 Dermatological concerns      Plan:  Recurrent UTI, vaginal atrophy  - She has not had a UTI since December 2023.   - She is currently off Keflex as per her oncologist's recommendation.  - Continue using tv estrogen 2 nights a week to maintain vaginal acidity and prevent UTIs.  - Reinforced the proper application of estrogen cream, ensuring it is applied thoroughly to prevent it from falling out.  - Standing orders for urine cultures have been placed to monitor for any future UTIs.  - She has been provided with urine culture cups for home collection if needed.  - POPQ-: Aa: -2, C: -8, TVL: 10, Ap: -3  - Rx refill for tv estrogen sent to patient's preferred pharmacy.     2. Lung cancer  - Continue current oncological management as directed by her oncologist.  - No changes to the current tx plan for lung cancer were discussed during this visit.     3. Kidney issues  - She mentioned a major kidney problem but did not provide specific details.  - No specific plan was discussed for this issue during the visit.     4. Dermatological concerns  - She has a hx of eczema, which was previously misdiagnosed as a vascular issue.  - No specific tx plan discussed during this visit.     Follow-up in 3-4 months with Dr. Chapa to reassess her condition and the management plan. She is advised to contact the clinic if she experiences recurrent UTIs (2 in 6 months or 3 in a year) or any other concerns.     Urinary Symptoms:   - Patient endorses severe urinary urgency with UUI episodes.   - Voids 8x per day.   - She does not sleep well and has baseline insomnia; unable to determine frequency of nocturia.   - Rare DARLYN leakage.   - UUI > DARLYN  - Recently had a urine culture with no growth on Monday; no positive urine cultures since 2023.   - Patient is not currently on cytotoxic chemotherapy but is on a targeted agent due to having lung cancer.  - She continues using transvaginal Estrace cream 2x/week.   - The patient previously tried Myrbetriq with failure to  improve her OAB symptoms.   - Primarily drinks water during the day and avoids caffeinated beverages.   - She takes Furosemide every day to help manage her BLE edema and HTN.     Bowel Symptoms:  - Patient endorses constipation with pushing/straining to pass a BM despite using Colace.   - Denies experiencing any diarrhea.     ENT Symptoms:  - The patient reports having a severe episode of epistaxis x1 week ago described as hemorrhaging blood from her nose that ran down the back of her throat; she did not go to the ED but she told her oncologist about this episode.        Past Medical History:    Medical History[1]       Past Surgical History:     Surgical History[2]      Medications:     Prior to Admission medications    Medication Sig Start Date End Date Taking? Authorizing Provider   acetaminophen (Tylenol 8 HOUR) 650 mg ER tablet Take 1 tablet (650 mg) by mouth every 8 hours. Do not crush, chew, or split.    Historical Provider, MD   albuterol 90 mcg/actuation inhaler Inhale 2 puffs every 4 hours if needed for shortness of breath.  Patient not taking: Reported on 5/6/2025 10/15/24   Court Jerome MD MPH   cephalexin (Keflex) 250 mg capsule TAKE 1 CAPSULE BY MOUTH EVERY DAY  Patient not taking: Reported on 5/6/2025 12/31/24   Aj Morton MD   cetirizine (ZyrTEC) 10 mg tablet Take 1 tablet (10 mg) by mouth once daily.    Historical Provider, MD   cholecalciferol (Vitamin D-3) 50 mcg (2,000 unit) capsule Take 1 capsule (50 mcg) by mouth early in the morning.. 4/16/14   Historical Provider, MD   clobetasol (Temovate) 0.05 % cream Apply 1 Application topically 2 times a day as needed (irritation).    Historical Provider, MD   cranberry fruit 465 mg capsule Take 465 mg by mouth early in the morning..    Historical Provider, MD   DULoxetine (Cymbalta) 30 mg DR capsule Take 1 capsule (30 mg) by mouth once daily. Do not crush or chew.  Patient not taking: Reported on 5/6/2025 10/9/24 10/9/25  Court Jerome  "MD MPH   estradiol (Estrace) 0.01 % (0.1 mg/gram) vaginal cream Place a pea sized amount vaginally 3 times a week 4/3/25   Elena Chapa MD   famotidine (Pepcid) 20 mg tablet Take 1 tablet (20 mg) by mouth once daily at bedtime. 12/17/24 12/17/25  Court Jerome MD MPH   furosemide (Lasix) 20 mg tablet Take 1 tablet (20 mg) by mouth 2 times daily (morning and late afternoon). 5/28/25   Killian Madrigal MD   inhalational spacing device inhaler Use as directed with inhalers 10/21/24 10/21/25  Court Jerome MD MPH   mirabegron (Myrbetriq) 50 mg tablet extended release 24 hr 24 hr tablet Take 1 tablet (50 mg) by mouth once daily. 12/6/24 12/6/25  Aj Morton MD   mirtazapine (Remeron) 7.5 mg tablet Take 1 tablet (7.5 mg) by mouth once daily at bedtime. 12/17/24 12/17/25  Court Jerome MD MPH   osimertinib (Tagrisso) 80 mg tablet Take 1 tablet (80 mg total) by mouth once daily.  Patient taking differently: Take 40 mg by mouth once daily. 11/22/24   Historical Provider, MD   spironolactone (Aldactone) 25 mg tablet Take 1 tablet (25 mg) by mouth once daily.  Patient not taking: Reported on 5/6/2025 12/17/24 12/17/25  Court Jerome MD MPH        ROS  Review of Systems   Constitutional: Negative.    HENT:  Positive for nosebleeds.    Eyes: Negative.    Respiratory: Negative.     Cardiovascular: Negative.    Gastrointestinal: Negative.    Endocrine: Negative.    Genitourinary:  Positive for enuresis, frequency and urgency.   Musculoskeletal: Negative.    Neurological: Negative.    Psychiatric/Behavioral: Negative.          Blood, Urine   Date Value Ref Range Status   12/09/2024 NEGATIVE NEGATIVE Final     Nitrite, Urine   Date Value Ref Range Status   12/09/2024 NEGATIVE NEGATIVE Final     Urobilinogen, Urine   Date Value Ref Range Status   12/09/2024 Normal Normal mg/dL Final         PHYSICAL EXAM:    /69 (Patient Position: Sitting)   Pulse 76   Ht (!) 1.448 m (4' 9\")   Wt 50.2 kg (110 lb " 9.6 oz)   BMI 23.93 kg/m²   No LMP recorded. Patient is postmenopausal.      Declines chaperone for physical exam.      Well developed, well nourished, in no apparent distress.   Neurologic/Psychiatric:  Awake, Alert and Oriented times 3.  Affect normal.     GENITAL/URINARY:     External Genitalia:  The patient has normal appearing external genitalia, normal skenes and bartholins glands, and a normal hair distribution.  Her vulva is without lesions, erythema or discharge.  It is non-tender with appropriate sensation.     Urethral Meatus:  Size normal, Location normal, Lesions absent, Prolapse absent.    Urethra:  Fullness absent, Masses absent.    Bladder:  Fullness absent, Masses absent, Tenderness absent.    Vagina:  General appearance normal, Discharge absent, Lesions absent. Moderate vaginal atrophy. Mild pelvic organ prolapse present.     Cervix: Normal, no discharge.   Uterus:  normal size, mobile, and nontender  Adnexa: normal, no masses or tenderness over the bilateral adnexa     Anus/Perineum:  Lesions absent and masses absent. Normal appearing anus and perineum.     Stress urinary incontinence not demonstrable.         POP-Q:  Stage: 1  Position: sitting    Aa: -2       Ba:  C: -9   Gh:  Pb:  TVL: 10         Ap: -3 Bp:  D: 10               Data and DIAGNOSTIC STUDIES REVIEWED   Imaging  No results found.    Cardiology, Vascular, and Other Imaging  No other imaging results found for the past 7 days     Lab Results   Component Value Date    URINECULTURE SEE NOTE 05/28/2025    UAMICCOMM SEE COMMENT 02/05/2018      Lab Results   Component Value Date    GLUCOSE 96 10/28/2024    CALCIUM 9.7 10/28/2024     (L) 10/28/2024    K 5.2 10/28/2024    CO2 23 10/28/2024     10/28/2024    BUN 51 (H) 10/28/2024    CREATININE 1.98 (H) 10/28/2024     Lab Results   Component Value Date    WBC 4.6 10/28/2024    HGB 10.4 (L) 10/28/2024    HCT 33.3 (L) 10/28/2024    MCV 95 10/28/2024     10/28/2024           Elena Chapa MD         [1]   Past Medical History:  Diagnosis Date    Age-related osteoporosis without current pathological fracture 04/07/2022    Osteoporosis, postmenopausal    Allergic     Anemia     Anxiety     Arthritis     Asthma     Benign paroxysmal vertigo, unspecified ear     BPV (benign positional vertigo)    Cancer (Multi)     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    Change in bowel habit 09/13/2016    Increased bowel frequency    Chronic kidney disease     Chronic kidney disease, stage 3 unspecified (Multi) 06/12/2022    Chronic kidney disease (CKD), stage III (moderate)    Clotting disorder (Multi)     Diarrhea, unspecified 10/09/2017    Diarrhea, unspecified type    Diverticulitis of large intestine without perforation or abscess without bleeding 10/11/2017    Diverticulitis of colon    Diverticulosis of large intestine without perforation or abscess without bleeding 10/09/2017    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 bleeding    Dysphonia 04/28/2016    Dysphonia    Eczema     Essential (primary) hypertension 01/26/2019    Benign essential hypertension    Gastro-esophageal reflux disease without esophagitis 02/02/2020    GERD (gastroesophageal reflux disease)    Heart murmur     Nonrheumatic aortic (valve) insufficiency 12/07/2015    Nonrheumatic aortic valve insufficiency    Other conditions influencing health status     Osteoarthritis    Other fecal abnormalities 07/28/2015    Loose stools    Other specified noninflammatory disorders of vagina 07/17/2020    Vaginal irritation    Other specified noninflammatory disorders of vagina 05/25/2022    Vaginal irritation    Other specified symptoms and signs involving the circulatory and respiratory systems 04/28/2016    Abnormal lung sounds    Personal history of diseases of the blood and blood-forming organs and certain disorders involving the immune mechanism     History of  anemia    Personal history of other diseases of the circulatory system     History of hypertension    Personal history of other diseases of the circulatory system     History of mitral valve prolapse    Personal history of other diseases of the digestive system     History of esophageal reflux    Personal history of other diseases of the respiratory system 12/19/2013    Personal history of asthma    Personal history of other endocrine, nutritional and metabolic disease 12/19/2013    History of hyperlipidemia    Primary osteoarthritis, unspecified shoulder 12/08/2014    Shoulder arthritis    Radiculopathy, lumbosacral region 05/07/2019    Lumbosacral radiculopathy at L4    Unilateral primary osteoarthritis, left knee 06/05/2017    Arthritis of left knee    Urinary tract infection     Varicella    [2]   Past Surgical History:  Procedure Laterality Date    ADENOIDECTOMY      CATARACT EXTRACTION  09/04/2015    Cataract Surgery    COLONOSCOPY  09/04/2015    Complete Colonoscopy    OTHER SURGICAL HISTORY  09/04/2015    Direct Laryngoscopy With Stripping Of Vocal Cords    OTHER SURGICAL HISTORY  09/04/2015    Vocal Cordectomy    SHOULDER SURGERY  09/24/2013    Shoulder Surgery    TONSILLECTOMY  09/04/2015    Tonsillectomy

## 2025-07-03 ENCOUNTER — OFFICE VISIT (OUTPATIENT)
Dept: OBSTETRICS AND GYNECOLOGY | Facility: CLINIC | Age: 89
End: 2025-07-03
Payer: MEDICARE

## 2025-07-03 VITALS
HEIGHT: 57 IN | WEIGHT: 110.6 LBS | HEART RATE: 76 BPM | SYSTOLIC BLOOD PRESSURE: 116 MMHG | BODY MASS INDEX: 23.86 KG/M2 | DIASTOLIC BLOOD PRESSURE: 69 MMHG

## 2025-07-03 DIAGNOSIS — N39.0 RECURRENT UTI: ICD-10-CM

## 2025-07-03 DIAGNOSIS — N81.4 UTEROVAGINAL PROLAPSE: Primary | ICD-10-CM

## 2025-07-03 DIAGNOSIS — N32.81 OVERACTIVE BLADDER: ICD-10-CM

## 2025-07-03 PROCEDURE — 3074F SYST BP LT 130 MM HG: CPT | Performed by: OBSTETRICS & GYNECOLOGY

## 2025-07-03 PROCEDURE — 99214 OFFICE O/P EST MOD 30 MIN: CPT | Performed by: OBSTETRICS & GYNECOLOGY

## 2025-07-03 PROCEDURE — 3078F DIAST BP <80 MM HG: CPT | Performed by: OBSTETRICS & GYNECOLOGY

## 2025-07-03 PROCEDURE — 1126F AMNT PAIN NOTED NONE PRSNT: CPT | Performed by: OBSTETRICS & GYNECOLOGY

## 2025-07-03 PROCEDURE — 1159F MED LIST DOCD IN RCRD: CPT | Performed by: OBSTETRICS & GYNECOLOGY

## 2025-07-03 RX ORDER — SOLIFENACIN SUCCINATE 5 MG/1
5 TABLET, FILM COATED ORAL DAILY
Qty: 30 TABLET | Refills: 3 | Status: SHIPPED | OUTPATIENT
Start: 2025-07-03 | End: 2026-07-03

## 2025-07-03 ASSESSMENT — ENCOUNTER SYMPTOMS
DEPRESSION: 0
LOSS OF SENSATION IN FEET: 0
ENDOCRINE NEGATIVE: 1
CARDIOVASCULAR NEGATIVE: 1
FREQUENCY: 1
CONSTITUTIONAL NEGATIVE: 1
MUSCULOSKELETAL NEGATIVE: 1
NEUROLOGICAL NEGATIVE: 1
OCCASIONAL FEELINGS OF UNSTEADINESS: 0
EYES NEGATIVE: 1
GASTROINTESTINAL NEGATIVE: 1
RESPIRATORY NEGATIVE: 1
PSYCHIATRIC NEGATIVE: 1

## 2025-07-03 ASSESSMENT — PAIN SCALES - GENERAL: PAINLEVEL_OUTOF10: 0-NO PAIN

## 2025-07-04 LAB — BACTERIA UR CULT: NORMAL

## 2025-07-16 ENCOUNTER — APPOINTMENT (OUTPATIENT)
Dept: RADIOLOGY | Facility: HOSPITAL | Age: 89
End: 2025-07-16
Payer: MEDICARE

## 2025-07-16 ENCOUNTER — HOSPITAL ENCOUNTER (EMERGENCY)
Facility: HOSPITAL | Age: 89
Discharge: HOME | End: 2025-07-16
Attending: STUDENT IN AN ORGANIZED HEALTH CARE EDUCATION/TRAINING PROGRAM
Payer: MEDICARE

## 2025-07-16 VITALS
HEIGHT: 57 IN | WEIGHT: 110 LBS | RESPIRATION RATE: 20 BRPM | HEART RATE: 101 BPM | DIASTOLIC BLOOD PRESSURE: 56 MMHG | BODY MASS INDEX: 23.73 KG/M2 | SYSTOLIC BLOOD PRESSURE: 111 MMHG | OXYGEN SATURATION: 97 % | TEMPERATURE: 98.1 F
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
## 2025-07-16 DIAGNOSIS — M25.521 RIGHT ELBOW PAIN: ICD-10-CM

## 2025-07-16 DIAGNOSIS — I82.451 ACUTE DEEP VEIN THROMBOSIS (DVT) OF RIGHT PERONEAL VEIN (MULTI): ICD-10-CM

## 2025-07-16 LAB
APTT PPP: 28 SECONDS (ref 26–36)
BASOPHILS # BLD AUTO: 0.02 X10*3/UL (ref 0–0.1)
BASOPHILS NFR BLD AUTO: 0.2 %
EOSINOPHIL # BLD AUTO: 0.06 X10*3/UL (ref 0–0.4)
EOSINOPHIL NFR BLD AUTO: 0.6 %
ERYTHROCYTE [DISTWIDTH] IN BLOOD BY AUTOMATED COUNT: 14.2 % (ref 11.5–14.5)
HCT VFR BLD AUTO: 29.4 % (ref 36–46)
HGB BLD-MCNC: 9.2 G/DL (ref 12–16)
IMM GRANULOCYTES # BLD AUTO: 0.05 X10*3/UL (ref 0–0.5)
IMM GRANULOCYTES NFR BLD AUTO: 0.5 % (ref 0–0.9)
INR PPP: 1.2 (ref 0.9–1.1)
LYMPHOCYTES # BLD AUTO: 0.32 X10*3/UL (ref 0.8–3)
LYMPHOCYTES NFR BLD AUTO: 3.3 %
MCH RBC QN AUTO: 30.9 PG (ref 26–34)
MCHC RBC AUTO-ENTMCNC: 31.3 G/DL (ref 32–36)
MCV RBC AUTO: 99 FL (ref 80–100)
MONOCYTES # BLD AUTO: 0.61 X10*3/UL (ref 0.05–0.8)
MONOCYTES NFR BLD AUTO: 6.2 %
NEUTROPHILS # BLD AUTO: 8.75 X10*3/UL (ref 1.6–5.5)
NEUTROPHILS NFR BLD AUTO: 89.2 %
NRBC BLD-RTO: 0 /100 WBCS (ref 0–0)
PLATELET # BLD AUTO: 145 X10*3/UL (ref 150–450)
PROTHROMBIN TIME: 12.9 SECONDS (ref 9.8–12.4)
RBC # BLD AUTO: 2.98 X10*6/UL (ref 4–5.2)
WBC # BLD AUTO: 9.8 X10*3/UL (ref 4.4–11.3)

## 2025-07-16 PROCEDURE — 74176 CT ABD & PELVIS W/O CONTRAST: CPT

## 2025-07-16 PROCEDURE — 36415 COLL VENOUS BLD VENIPUNCTURE: CPT | Performed by: STUDENT IN AN ORGANIZED HEALTH CARE EDUCATION/TRAINING PROGRAM

## 2025-07-16 PROCEDURE — 85610 PROTHROMBIN TIME: CPT | Performed by: STUDENT IN AN ORGANIZED HEALTH CARE EDUCATION/TRAINING PROGRAM

## 2025-07-16 PROCEDURE — 85025 COMPLETE CBC W/AUTO DIFF WBC: CPT | Performed by: STUDENT IN AN ORGANIZED HEALTH CARE EDUCATION/TRAINING PROGRAM

## 2025-07-16 PROCEDURE — 70450 CT HEAD/BRAIN W/O DYE: CPT

## 2025-07-16 PROCEDURE — 93971 EXTREMITY STUDY: CPT | Performed by: RADIOLOGY

## 2025-07-16 PROCEDURE — 93971 EXTREMITY STUDY: CPT

## 2025-07-16 PROCEDURE — 2500000001 HC RX 250 WO HCPCS SELF ADMINISTERED DRUGS (ALT 637 FOR MEDICARE OP): Performed by: STUDENT IN AN ORGANIZED HEALTH CARE EDUCATION/TRAINING PROGRAM

## 2025-07-16 PROCEDURE — 99285 EMERGENCY DEPT VISIT HI MDM: CPT | Mod: 25 | Performed by: STUDENT IN AN ORGANIZED HEALTH CARE EDUCATION/TRAINING PROGRAM

## 2025-07-16 PROCEDURE — 73080 X-RAY EXAM OF ELBOW: CPT | Mod: RT

## 2025-07-16 PROCEDURE — 85730 THROMBOPLASTIN TIME PARTIAL: CPT | Performed by: STUDENT IN AN ORGANIZED HEALTH CARE EDUCATION/TRAINING PROGRAM

## 2025-07-16 PROCEDURE — 72125 CT NECK SPINE W/O DYE: CPT

## 2025-07-16 PROCEDURE — 73080 X-RAY EXAM OF ELBOW: CPT | Mod: RIGHT SIDE | Performed by: RADIOLOGY

## 2025-07-16 RX ORDER — ACETAMINOPHEN 325 MG/1
975 TABLET ORAL ONCE
Status: COMPLETED | OUTPATIENT
Start: 2025-07-16 | End: 2025-07-16

## 2025-07-16 RX ADMIN — APIXABAN 10 MG: 5 TABLET, FILM COATED ORAL at 12:16

## 2025-07-16 RX ADMIN — ACETAMINOPHEN 975 MG: 325 TABLET, FILM COATED ORAL at 09:18

## 2025-07-16 ASSESSMENT — PAIN SCALES - GENERAL
PAINLEVEL_OUTOF10: 0 - NO PAIN

## 2025-07-16 ASSESSMENT — PAIN - FUNCTIONAL ASSESSMENT
PAIN_FUNCTIONAL_ASSESSMENT: 0-10
PAIN_FUNCTIONAL_ASSESSMENT: 0-10

## 2025-07-16 NOTE — ED PROVIDER NOTES
"History of Present Illness     History provided by: Patient  Limitations to History: None  External Records Reviewed with Brief Summary: Outpatient records from [facility] showing patient's history of cancer.    HPI:  Kajal Null is a 89 y.o. female who states that she has \"multiple cancers\" with pertinent past medical history of laryngeal and lung cancer with metastases who presents with a chief concern of a fall.  Patient states that she was in her yard getting ready for a party that she has to attend later today when she fell, she was unable to stand because she was having pain \"all over the place.\"  Someone found her while walking in the neighborhood behind a bush and she was able to be helped up.  States she was out in the yard for quite some time.  Denies hitting her head denies loss of consciousness.  She is complaining of right elbow pain as well as side pain.  She denies any current abdominal pain, chest pain, neck or back pain.  Denies any lower extremity pain.    Physical Exam   Triage vitals:  T 36.7 °C (98.1 °F)  HR (!) 101  /54  RR 20  O2 95 % None (Room air)    Physical Exam  Vitals and nursing note reviewed.   Constitutional:       Appearance: She is not ill-appearing.   HENT:      Head: Atraumatic.      Comments: No raccoon eyes, no Nelson sign, no septal hematoma, no epistaxis or intraoral trauma.      Nose: Nose normal.      Mouth/Throat:      Pharynx: Oropharynx is clear.     Eyes:      Extraocular Movements: Extraocular movements intact.      Pupils: Pupils are equal, round, and reactive to light.       Cardiovascular:      Rate and Rhythm: Normal rate and regular rhythm.      Pulses: Normal pulses.      Heart sounds: Murmur heard.   Pulmonary:      Effort: Pulmonary effort is normal. No respiratory distress.      Breath sounds: Normal breath sounds.   Abdominal:      General: Abdomen is flat.      Palpations: Abdomen is soft.      Tenderness: There is abdominal tenderness " "(Right sided discomfort). There is right CVA tenderness. There is no left CVA tenderness, guarding or rebound.     Musculoskeletal:         General: Tenderness (Right thoracic chest wall posteriorly with no step off or deformity, R elbow, Right lower leg/calf) present.      Cervical back: No tenderness.      Right lower leg: Edema (2+ pitting) present.      Left lower leg: Edema (1+ pitting) present.      Comments: No tenderness to palpation over the cervical thoracic or lumbar spine with no step-offs or deformities.  No tenderness to palpation of the chest wall, abdomen, pelvis pelvis stable to pelvic rock.        Skin:     General: Skin is warm and dry.      Capillary Refill: Capillary refill takes less than 2 seconds.      Findings: Bruising present.     Neurological:      General: No focal deficit present.      Mental Status: She is alert.          Medical Decision Making & ED Course   ED Course:  ED Course as of 07/16/25 2038 Wed Jul 16, 2025   0770 Patient is an 89-year-old female who states that she has \"multiple cancers\" with pertinent past medical history of laryngeal and lung cancer with metastases who presents with a chief concern of a fall.  Patient states that she was in her yard getting ready for a party that she has to attend later today when she fell, she was unable to stand because she was having pain \"all over the place.\"  Someone found her while walking in the neighborhood behind a bush and she was able to be helped up.  States she was out in the yard for quite some time.  Denies hitting her head denies loss of consciousness.  She is complaining of right elbow pain as well as side pain.  She denies any current abdominal pain, chest pain, neck or back pain.  Denies any lower extremity pain.    On exam, patient does have bruising swelling and hematoma to the right elbow with full range of motion no tenderness to palpation of the distal or proximal right upper extremity.  No tenderness along the " anterior chest wall but does have some vague tenderness along the posterior thoracic back over the ribs underneath the scapula as well as in her right flank.  Abdomen otherwise soft nontender, pulse equal in all 4 extremities.  Pelvis stable with pelvic rock no pain.    Due to her presumed prolonged downtime could consider rhabdomyolysis though I suspect this less likely with how well-appearing she is.  Could also consider hollow viscus injury, rib fracture, underlying head trauma, distracting injury.  [KK]   0851 XR elbow right 3+ views  IMPRESSION:  1. No acute fracture or malalignment.  2. Mild posterior elbow soft tissue swelling which may represent  contusion. Correlate with physical examination.   [KK]   0913 CT head wo IV contrast    IMPRESSION:  Age-related changes. No acute intracranial process.       [KK]   0940 CT chest abdomen pelvis wo IV contrast  IMPRESSION:  Scattered small bulla of the lungs. Irregular region of consolidation  and volume loss in the right lower lobe posterior basilar aspect as  well as a smaller region of volume loss and infiltrate in the left  lower lobe basilar aspect could be related to multifocal infection.      No definite traumatic solid organ injury within the limitations of a  noncontrast exam. No intraperitoneal free fluid.      Multiple indeterminate low-attenuation splenic lesions similar to  prior.      MACRO:  None.   [KK]   0941 CT cervical spine wo IV contrast  IMPRESSION:  Osteopenia.      Prominent multilevel degenerative disc and facet changes throughout  the entire cervical spine.      Mild subluxations at multiple levels of the cervical spine as  detailed which are likely chronic/degenerative.      No acute fracture or compression deformity.       [KK]   1026 Patient has an acute right lower extremity DVT of the peroneal vein, obtaining blood work to assess if patient would be a candidate for DVT therapy.  Limited workup being completed in the setting of  patient's concern for her discomfort and I feel that limited workup and close follow-up with her oncologist is appropriate at this time with her current vitals. [KK]      ED Course User Index  [KK] Michel Anaya,          Diagnoses as of 25   Fall, initial encounter   Right elbow pain   Acute deep vein thrombosis (DVT) of right peroneal vein (Multi)       Medical Decision Makin y.o. female presented with complaints of a fall.  She fell this morning and was found on her front yard.  Initial workup delayed as patient was refusing all care and hoping to leave.  She did appear reasonable and had a sound arguments and understanding of the risks.  Her son arrived and she was amenable to imaging studies which were completed.  Traumatic workup was unremarkable.  She did have swelling and pain of her right lower extremity which did show an acute DVT of the peroneal vein.  With patient's high risk of embolism in the setting of her active cancer, I believe she would benefit from anticoagulation.  She notes that this was the first time she was fallen and son notes that she has not had any recurrent falls and therefore I think this initial fall is not consistent with a recurrent pattern that would preclude her from the risks of anticoagulation.  I did offer her further workup and evaluation which was declined in favor of minimal workup due to the amount of follow-up she has with her oncologist.  I felt with her being on room air, and her tachycardia improving while in the room on multiple reevaluations, and being ambulatory in the department without difficulty I feel that this would be appropriate.  Her platelets and blood counts appear consistent with stability compared to her previous lab values and she was started on Eliquis with prescription for Eliquis prescribed for her.  We discussed at length the risks of bleeding and signs symptoms to return.  Otherwise patient will be appropriate for discharge as her  traumatic workup is unremarkable and she will follow-up with her oncology team.  ----  Discussed signs and symptoms to return the emergency department, all questions were answered, patient agreeable with disposition and discharged in stable condition.    Differential diagnoses considered include but are not limited to: see ED Course     EKG Independent Interpretation: Patient refused EKG    Independent Result Review and Interpretation: see ED course     Chronic conditions affecting the patient's care: as documented in ED course/MDM    The patient was discussed with the following consultants/services: see ED course    Care Considerations: as documented in ED course/MDM      Disposition   As a result of the work-up, the patient was discharged home.  she was informed of her diagnosis and instructed to come back with any concerns or worsening of condition.  she and was agreeable to the plan as discussed above.  she was given the opportunity to ask questions.  All of the patient's questions were answered.    Procedures   Procedures       Michel Anaya DO  07/16/25 0126

## 2025-07-18 DIAGNOSIS — I82.451 ACUTE DEEP VEIN THROMBOSIS (DVT) OF RIGHT PERONEAL VEIN (MULTI): Primary | ICD-10-CM

## 2025-07-21 ENCOUNTER — DOCUMENTATION (OUTPATIENT)
Dept: PRIMARY CARE | Facility: HOSPITAL | Age: 89
End: 2025-07-21
Payer: MEDICARE

## 2025-07-22 ENCOUNTER — OFFICE VISIT (OUTPATIENT)
Dept: PRIMARY CARE | Facility: HOSPITAL | Age: 89
End: 2025-07-22
Payer: MEDICARE

## 2025-07-22 ENCOUNTER — HOSPITAL ENCOUNTER (OUTPATIENT)
Dept: VASCULAR MEDICINE | Facility: HOSPITAL | Age: 89
Discharge: HOME | End: 2025-07-22
Payer: MEDICARE

## 2025-07-22 VITALS
BODY MASS INDEX: 23.45 KG/M2 | DIASTOLIC BLOOD PRESSURE: 61 MMHG | OXYGEN SATURATION: 97 % | HEART RATE: 67 BPM | TEMPERATURE: 97.7 F | WEIGHT: 108.7 LBS | HEIGHT: 57 IN | SYSTOLIC BLOOD PRESSURE: 103 MMHG

## 2025-07-22 DIAGNOSIS — D50.8 IRON DEFICIENCY ANEMIA SECONDARY TO INADEQUATE DIETARY IRON INTAKE: ICD-10-CM

## 2025-07-22 DIAGNOSIS — Z78.9 HEALTH MAINTENANCE ALTERATION: Primary | ICD-10-CM

## 2025-07-22 DIAGNOSIS — I82.451 ACUTE DEEP VEIN THROMBOSIS (DVT) OF RIGHT PERONEAL VEIN (MULTI): ICD-10-CM

## 2025-07-22 DIAGNOSIS — I82.411 DVT OF DEEP FEMORAL VEIN, RIGHT: ICD-10-CM

## 2025-07-22 DIAGNOSIS — E83.52 HYPERCALCEMIA DUE TO A DRUG: ICD-10-CM

## 2025-07-22 DIAGNOSIS — C34.31 MALIGNANT NEOPLASM OF LOWER LOBE OF RIGHT LUNG (MULTI): ICD-10-CM

## 2025-07-22 DIAGNOSIS — T50.905A HYPERCALCEMIA DUE TO A DRUG: ICD-10-CM

## 2025-07-22 DIAGNOSIS — I82.4Z1 ACUTE EMBOLISM AND THROMBOSIS OF UNSPECIFIED DEEP VEINS OF RIGHT DISTAL LOWER EXTREMITY: ICD-10-CM

## 2025-07-22 PROCEDURE — 1125F AMNT PAIN NOTED PAIN PRSNT: CPT | Performed by: PEDIATRICS

## 2025-07-22 PROCEDURE — 1159F MED LIST DOCD IN RCRD: CPT | Performed by: PEDIATRICS

## 2025-07-22 PROCEDURE — 93971 EXTREMITY STUDY: CPT

## 2025-07-22 PROCEDURE — 3078F DIAST BP <80 MM HG: CPT | Performed by: PEDIATRICS

## 2025-07-22 PROCEDURE — 99214 OFFICE O/P EST MOD 30 MIN: CPT | Mod: GC,25 | Performed by: STUDENT IN AN ORGANIZED HEALTH CARE EDUCATION/TRAINING PROGRAM

## 2025-07-22 PROCEDURE — 93971 EXTREMITY STUDY: CPT | Performed by: SURGERY

## 2025-07-22 PROCEDURE — 99214 OFFICE O/P EST MOD 30 MIN: CPT | Performed by: PEDIATRICS

## 2025-07-22 PROCEDURE — 3074F SYST BP LT 130 MM HG: CPT | Performed by: PEDIATRICS

## 2025-07-22 ASSESSMENT — COLUMBIA-SUICIDE SEVERITY RATING SCALE - C-SSRS
2. HAVE YOU ACTUALLY HAD ANY THOUGHTS OF KILLING YOURSELF?: NO
6. HAVE YOU EVER DONE ANYTHING, STARTED TO DO ANYTHING, OR PREPARED TO DO ANYTHING TO END YOUR LIFE?: NO
1. IN THE PAST MONTH, HAVE YOU WISHED YOU WERE DEAD OR WISHED YOU COULD GO TO SLEEP AND NOT WAKE UP?: NO

## 2025-07-22 ASSESSMENT — ENCOUNTER SYMPTOMS
LOSS OF SENSATION IN FEET: 1
DEPRESSION: 1
OCCASIONAL FEELINGS OF UNSTEADINESS: 1

## 2025-07-22 ASSESSMENT — PATIENT HEALTH QUESTIONNAIRE - PHQ9
1. LITTLE INTEREST OR PLEASURE IN DOING THINGS: NOT AT ALL
2. FEELING DOWN, DEPRESSED OR HOPELESS: NOT AT ALL
SUM OF ALL RESPONSES TO PHQ9 QUESTIONS 1 AND 2: 0

## 2025-07-22 ASSESSMENT — PAIN SCALES - GENERAL: PAINLEVEL_OUTOF10: 7

## 2025-07-22 NOTE — PROGRESS NOTES
Notified by resident a phone call from patient's son, William.  Per William, patient today had episode of confusion, was driving to a known restaurant and ended up in a different part of town, was noted by a passerby to be stopped in the wrong kevyn going the wrong direction.  Has had slowly progressive issues with cognitive processing over the last year or so but this is a significant change.  Did have unexplained fall last week with a negative evaluation in the emergency department apart from a possible peroneal DVT.  Of note, patient was started on Vesicare for irritable bladder symptoms just over 2 weeks ago.    Per William, patient seems largely herself this evening, via FaceTime he had her do several physical tasks including observing gait, movements of her extremities, facial symmetry, and she appears to be grossly intact.    She is on osimertinib for her lung adenocarcinoma, has been for at least 4 months.  Per chart review her last MRI brain was in November and did not show any intracranial disease.    Unclear what is causing her cognitive changes, may be progression of a underlying dementia/cognitive impairment, given the timing of her recent new antimuscarinic therapy certainly possible that that is contributing.  With the timing and her known disease status which seem to been responding to therapy, it seems that intracranial or leptomeningeal disease is relatively unlikely, and do not see encephalitis reported as a common consequence of osimertinib.    I discussed with William that while I have not previously evaluated this patient myself, given what he is telling me it is reasonable to observe closely at home as opposed to bring him to the emergency department overnight.    Plan:  -Will get her into see me with the resident in [facility] tomorrow afternoon  -I provided my contact information which William will give to her oncologist at the [facility], will discuss possible need for MRI or further  testing with him  -I will reach out to the vascular lab to see if her vascular studies could be moved up, at this point patient's preference is not to be on anticoagulant therapy and she is not been taking the prescribed apixaban.  If vascular studies cannot be moved up, we will get part compression ultrasonography with radiology later this week.  -Consider neurology referral based on evaluation tomorrow.  -I will reach out to urogynecology regarding her Vesicare prescription and need for any replacement.

## 2025-07-22 NOTE — PROGRESS NOTES
[facility]    HPI:  Kajal Null is a 89 y.o. female w/ a PMH s/f stage IV adenocarcinoma of the lung, lymphocytic colitis, laryngeal cancer s/p radiation, CKD stage 4, anemia, s/p R shoulder replacement with recent recurrent malignant pleural effusions, recurrent UTI, stage 4 CKD, HTN, GERD, vaginal atrophy, BLE edema presenting for a Medical Illness Visit.     Interval History Since Last Visit (per chart review):  On 7/21/25, the patient had a episode of confusion and after attempting to drive to a known restaurant, she ended up in a different part of town, and she was noted by a passerby to be stopped in the wrong kevyn going the wrong direction. She has been having slowly progressing issues with cognitive processing over the last year, but this recent event has been a significant change. The patient had a fall last week with a negative evaluation in the ED, apart from a possible peroneal DVT. Patient was started on vesicare for irritable bladder systems ~2 weeks ago. Patient has thus far been treated with 4 months of osimertinib for her lung adenocarcinoma. MRI brain wwo in Nov 2024 did not reveal any intracranial metastatic disease. DVT US on 7/16/25 revealed no DVT in common femoral vein, femoral vein, and popliteal vein, and possible occlusive acute thrombus in the right peroneal vein.    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 The following year, she had a local recurrence of carcinoma in situ for which she underwent additional local therapy. She continued to undergo active surveillance over the next 12 years, with the development of an additional focus of carcinoma in situ in 2017; it was elected to monitor this. In 2023, she was found to have new squamous cell carcinoma, again involving the  right vocal cord. She was referred to [facility] Burris DO division of radiation oncology who offered definitive radiation therapy.     During the course of her evaluation for her new vocal cord tumor, she underwent a CT scan of the chest and an FDG PET scan both of which revealed a 2.2 cm nodule in the right lower lobe that was FDG avid and presumed to represent a stage I non-small cell lung cancer. Due to the location, I biopsy was proven to be difficult and she was ultimately treated without histological diagnosis, receiving SBRT with an initial good response to therapy. More recent imaging has demonstrated the development of a right pleural effusion. She presented to the  emergency department with progressively worsening dyspnea with minimal exertion associated with weight loss. She also states that she had new dysgeusia and generalized fatigue. Imaging during the hospitalization revealed a new large right pleural effusion for which she underwent a thoracentesis. Pathology review at  confirmed the presence of adenocarcinoma.     Patient has been treated osimertinib since December 2024, with dose decreased from 80 mg to 40 mg due to CKD. Last PET scan on 6/2/25 revealed multiple nodularities along the lateral right lung including mid lung 1.2 cm with SUV max 3.2 previously 2.8 (4:141). Improvement in pleural-based nodularity in lateral lower lung, previously 1.5 cm with SUV max 3.1 has practically resolved with background uptake SUV max 1.4 (4:128).     Today Per Patient:  DVT US today confirmed acute DVT in right peroneal vein (no clear proximal progression since last scan on 7/16/25).    The patient does acknowledge some change in her short term memory, with two notable recent events including a fall due to imbalance on 7/16/25 and an episode of confusion on 7/21/25 when patient became confused while driving and drove to wrong location and in the wrong kev[place]. Patient is presenting to clinic today with her son  and daughter-in-law.     Patient completed MMSE with score of 25/30. She missed points for incorrect date, incorrect location within hospital, forgetting numbers while counting down by 7 from 100, and incomplete drawing of 2 shapes presented to her in specific angles.    Objective   Past Medical History: Medical History[1]    LMP: No LMP recorded. Patient is postmenopausal.     Allergies: RX Allergies[2]    Surgical History: Surgical History[3]    Family History: Family History[4]    Social history:  reports that she quit smoking about 37 years ago. Her smoking use included cigarettes. She started smoking about 65 years ago. She has never used smokeless tobacco. She reports that she does not currently use alcohol. She reports that she does not use drugs.   Social History     Social History Narrative    Not on file       Medications:Current Medications[5]    Vitals: There were no vitals filed for this visit.    Physical exam:  Constitutional: Well-developed female in no acute distress.  HEENT: NC/AT, sclera anicteric  Respiratory: CTAB. No wheezes, rales, or rhonchi. Normal respiratory effort.  Cardiovascular: RRR. No murmurs, gallops, or rubs.  Abdominal: Soft, nondistended, nontender to palpation. Bowel sounds present. No hepatosplenomegaly or masses.   Neuro: AAOx3. CN II-XII grossly intact. Tongue midline, no facial droop  MSK: No LE edema bilaterally. Moving extremities well  Skin: Warm, dry. No rashes or wounds.  Psych: Appropriate mood and affect.    Labs: No results found. However, due to the size of the patient record, not all encounters were searched. Please check Results Review for a complete set of results.    Imaging: Imaging  Lower extremity venous duplex right  Result Date: 7/16/2025  1. No DVT in the common femoral vein, femoral vein and popliteal vein. 2. Limited evaluation of the calf veins due to edema. Possible occlusive acute thrombus in the right peroneal vein.   Signed by: Anthony Davis  7/16/2025 9:54 AM Dictation workstation:   [id number]    CT chest abdomen pelvis wo IV contrast  Result Date: 7/16/2025  Scattered small bulla of the lungs. Irregular region of consolidation and volume loss in the right lower lobe posterior basilar aspect as well as a smaller region of volume loss and infiltrate in the left lower lobe basilar aspect could be related to multifocal infection.   No definite traumatic solid organ injury within the limitations of a noncontrast exam. No intraperitoneal free fluid.   Multiple indeterminate low-attenuation splenic lesions similar to prior.   MACRO: None.   Signed by: Dawson Liu 7/16/2025 9:23 AM Dictation workstation:   [id number]    CT cervical spine wo IV contrast  Result Date: 7/16/2025  Osteopenia.   Prominent multilevel degenerative disc and facet changes throughout the entire cervical spine.   Mild subluxations at multiple levels of the cervical spine as detailed which are likely chronic/degenerative.   No acute fracture or compression deformity.       MACRO: None.   Signed by: Dawson Liu 7/16/2025 9:13 AM Dictation workstation:   [id number]    CT head wo IV contrast  Result Date: 7/16/2025  Age-related changes. No acute intracranial process.       MACRO: None.   Signed by: Dawson Liu 7/16/2025 9:09 AM Dictation workstation:   [id number]    XR elbow right 3+ views  Result Date: 7/16/2025  1. No acute fracture or malalignment. 2. Mild posterior elbow soft tissue swelling which may represent contusion. Correlate with physical examination.   MACRO: None.   Signed by: Marly Montoya 7/16/2025 8:24 AM Dictation workstation:   [id number]    Cardiology, Vascular, and Other Imaging  No other imaging results found for the past 7 days    Assessment and Plan   Kajal Null is a 89 y.o. female w/ a PMH s/f stage IV adenocarcinoma of the lung, lymphocytic colitis, laryngeal cancer s/p radiation, CKD, anemia, s/p R shoulder replacement with recent  recurrent malignant pleural effusions, recurrent UTI, stage 4 CKD, HTN, GERD, vaginal atrophy, BLE edema presenting for a Medical Illness Visit.     Refills Provided This Visit:  None    Updates  - ordering CBC, CMP, Mg, Phos, Vitamin D, PTH, and PTHrP    #RLE peroneal DVT  - confirmed on vascular lab US today   - patient is not taking eliquis   - hold off on therapeutic anticoagulation at this time; plan for repeat vascular lab US next week (to be done at [facility])    #HTN  #Chronic BLE edema   - not taking spironolactone 25 mg daily   - continue lasix 20 mg BID     #stage 4 CKD  - CTM  - obtaining CMP    #stage IV adenocarcinoma of the lung c/b pleural effusions   #laryngeal cancer s/p radiation   - on 40 mg osimertinib daily   - following with Dr. Ferrera at [facility]  - our team will update Dr. Ferrera's office ([phone]) upon completion of workup    #Hypercalcemia   - last CMP on 7/2/25 revealed hypercalcemia of 10.9 (increased from 10.1 in June 2025)  - obtain new CMP, Mg, Phos today  - obtain PTH, PTHrP, and Vitamin D levels     #Overactive bladder  - vesicare 5 mg started a few weeks ago for overactive bladder   - discontinued following discussion with uro-gynecologist   - obtain UA to rule out UTI    #Lymphocytic colitis  #GERD  - famotidine 20 mg nightly    #Anemia   - CTM  - CBC today    Follow-up in ~1 week via virtual visit to check in on patient.    Patient and plan discussed with attending physician Dr. Mtz.    Eleazar Scales MD   Internal Medicine, PGY-2  [facility]          [1]   Past Medical History:  Diagnosis Date    Age-related osteoporosis without current pathological fracture 04/07/2022    Osteoporosis, postmenopausal    Allergic     Anemia     Anxiety     Arthritis     Asthma     Benign paroxysmal vertigo, unspecified ear     BPV (benign positional vertigo)    Cancer (Multi)     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    Squamous cell carcinoma in situ of true vocal  cord    Change in bowel habit 09/13/2016    Increased bowel frequency    Chronic kidney disease     Chronic kidney disease, stage 3 unspecified (Multi) 06/12/2022    Chronic kidney disease (CKD), stage III (moderate)    Clotting disorder (Multi)     Diarrhea, unspecified 10/09/2017    Diarrhea, unspecified type    Diverticulitis of large intestine without perforation or abscess without bleeding 10/11/2017    Diverticulitis of colon    Diverticulosis of large intestine without perforation or abscess without bleeding 10/09/2017    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 bleeding    Dysphonia 04/28/2016    Dysphonia    Eczema     Essential (primary) hypertension 01/26/2019    Benign essential hypertension    Gastro-esophageal reflux disease without esophagitis 02/02/2020    GERD (gastroesophageal reflux disease)    Heart murmur     Nonrheumatic aortic (valve) insufficiency 12/07/2015    Nonrheumatic aortic valve insufficiency    Other conditions influencing health status     Osteoarthritis    Other fecal abnormalities 07/28/2015    Loose stools    Other specified noninflammatory disorders of vagina 07/17/2020    Vaginal irritation    Other specified noninflammatory disorders of vagina 05/25/2022    Vaginal irritation    Other specified symptoms and signs involving the circulatory and respiratory systems 04/28/2016    Abnormal lung sounds    Personal history of diseases of the blood and blood-forming organs and certain disorders involving the immune mechanism     History of anemia    Personal history of other diseases of the circulatory system     History of hypertension    Personal history of other diseases of the circulatory system     History of mitral valve prolapse    Personal history of other diseases of the digestive system     History of esophageal reflux    Personal history of other diseases of the respiratory system 12/19/2013    Personal history of asthma    Personal history of other endocrine,  nutritional and metabolic disease 12/19/2013    History of hyperlipidemia    Primary osteoarthritis, unspecified shoulder 12/08/2014    Shoulder arthritis    Radiculopathy, lumbosacral region 05/07/2019    Lumbosacral radiculopathy at L4    Unilateral primary osteoarthritis, left knee 06/05/2017    Arthritis of left knee    Urinary tract infection     Varicella    [2]   Allergies  Allergen Reactions    Alendronate Other    Amoxicillin-Pot Clavulanate Diarrhea    Codeine GI intolerance, GI Upset and Hives    Dexlansoprazole Nausea/vomiting and Nausea Only    Diclofenac Diarrhea    House Dust Unknown    Mold Unknown    Nitrofurantoin GI Upset    Trimethoprim Diarrhea    Sulfa (Sulfonamide Antibiotics) Rash   [3]   Past Surgical History:  Procedure Laterality Date    ADENOIDECTOMY      CATARACT EXTRACTION  09/04/2015    Cataract Surgery    COLONOSCOPY  09/04/2015    Complete Colonoscopy    OTHER SURGICAL HISTORY  09/04/2015    Direct Laryngoscopy With Stripping Of Vocal Cords    OTHER SURGICAL HISTORY  09/04/2015    Vocal Cordectomy    SHOULDER SURGERY  09/24/2013    Shoulder Surgery    TONSILLECTOMY  09/04/2015    Tonsillectomy   [4] No family history on file.  [5]   Current Outpatient Medications:     acetaminophen (Tylenol 8 HOUR) 650 mg ER tablet, Take 1 tablet (650 mg) by mouth every 8 hours. Do not crush, chew, or split., Disp: , Rfl:     albuterol 90 mcg/actuation inhaler, Inhale 2 puffs every 4 hours if needed for shortness of breath. (Patient not taking: Reported on 5/6/2025), Disp: 18 g, Rfl: 2    apixaban (Eliquis) 5 mg (74 tabs) tablet, Take 2 tablets (10 mg) by mouth 2 times a day for 7 days, then take 1 tablet (5 mg) by mouth 2 times a day., Disp: 74 tablet, Rfl: 0    cephalexin (Keflex) 250 mg capsule, TAKE 1 CAPSULE BY MOUTH EVERY DAY (Patient not taking: Reported on 5/6/2025), Disp: 90 capsule, Rfl: 3    cetirizine (ZyrTEC) 10 mg tablet, Take 1 tablet (10 mg) by mouth once daily., Disp: , Rfl:      cholecalciferol (Vitamin D-3) 50 mcg (2,000 unit) capsule, Take 1 capsule (50 mcg) by mouth early in the morning.., Disp: , Rfl:     clobetasol (Temovate) 0.05 % cream, Apply 1 Application topically 2 times a day as needed (irritation)., Disp: , Rfl:     cranberry fruit 465 mg capsule, Take 465 mg by mouth early in the morning.., Disp: , Rfl:     DULoxetine (Cymbalta) 30 mg DR capsule, Take 1 capsule (30 mg) by mouth once daily. Do not crush or chew. (Patient not taking: Reported on 5/6/2025), Disp: 30 capsule, Rfl: 11    estradiol (Estrace) 0.01 % (0.1 mg/gram) vaginal cream, Place a pea sized amount vaginally 3 times a week, Disp: 42.5 g, Rfl: 2    famotidine (Pepcid) 20 mg tablet, Take 1 tablet (20 mg) by mouth once daily at bedtime., Disp: 90 tablet, Rfl: 3    furosemide (Lasix) 20 mg tablet, Take 1 tablet (20 mg) by mouth 2 times daily (morning and late afternoon)., Disp: 60 tablet, Rfl: 1    inhalational spacing device inhaler, Use as directed with inhalers, Disp: 1 each, Rfl: 0    mirabegron (Myrbetriq) 50 mg tablet extended release 24 hr 24 hr tablet, Take 1 tablet (50 mg) by mouth once daily., Disp: 90 tablet, Rfl: 3    mirtazapine (Remeron) 7.5 mg tablet, Take 1 tablet (7.5 mg) by mouth once daily at bedtime., Disp: 90 tablet, Rfl: 3    osimertinib (Tagrisso) 80 mg tablet, Take 1 tablet (80 mg total) by mouth once daily. (Patient taking differently: Take 40 mg by mouth once daily.), Disp: , Rfl:     solifenacin (Vesicare) 5 mg tablet, Take 1 tablet (5 mg) by mouth once daily. Swallow tablet whole; do not crush, chew, or split., Disp: 30 tablet, Rfl: 3    spironolactone (Aldactone) 25 mg tablet, Take 1 tablet (25 mg) by mouth once daily. (Patient not taking: Reported on 4/3/2025), Disp: 90 tablet, Rfl: 3

## 2025-07-22 NOTE — PATIENT INSTRUCTIONS
- obtain vascular lab DVT US next week (on or at around 7/29/25), 1 week from the DVT US on 7/22/25 that revealed a R peroneal vein clot    - follow up with telemedicine visit on 7/28/25 with [facility] (we will call you to set this up)    - please obtain CBC, CMP, Mg Phos, Vit D, PTH, and PTHrP labs at the [facility] closest to you    - once you have urinated, please deliver the specimen to the [facility] closest to you    - we will update Dr. Ferrera's office and get his thoughts on potential MRI imaging   Color consistent with ethnicity/race, warm, dry intact, resilient.

## 2025-07-23 LAB
APPEARANCE UR: CLEAR
BACTERIA #/AREA URNS HPF: ABNORMAL /HPF
BILIRUB UR QL STRIP: NEGATIVE
COLOR UR: YELLOW
GLUCOSE UR QL STRIP: NEGATIVE
HGB UR QL STRIP: NEGATIVE
HYALINE CASTS #/AREA URNS LPF: ABNORMAL /LPF
KETONES UR QL STRIP: NEGATIVE
LEUKOCYTE ESTERASE UR QL STRIP: ABNORMAL
NITRITE UR QL STRIP: NEGATIVE
PH UR STRIP: ABNORMAL [PH] (ref 5–8)
PROT UR QL STRIP: NEGATIVE
RBC #/AREA URNS HPF: ABNORMAL /HPF
SERVICE CMNT-IMP: ABNORMAL
SP GR UR STRIP: 1.01 (ref 1–1.03)
SQUAMOUS #/AREA URNS HPF: ABNORMAL /HPF
WBC #/AREA URNS HPF: ABNORMAL /HPF

## 2025-07-23 NOTE — PROGRESS NOTES
I saw and evaluated the patient. I personally obtained the key and critical portions of the history and physical exam or was physically present for key and critical portions performed by the resident/fellow. I reviewed the resident/fellow's documentation and discussed the patient with the resident/fellow. I agree with the resident/fellow's medical decision making as documented in the note.    Patient seen due to acute confusional episodes and fall, superimposed on ~1 year cognitive decline  Neurologic exam reassuring  MMSE 25/30 though suspect this is a slight overscore. Difficulty with attention. Did well with clock.  Suspect mild increase in confusion on mild cognitive impairment  Ddx includes med induced (vesicare?), stroke (no deficits to suggest), infection (no evidence on history or exam, no UTI sx), hypercalcemia, cancer related (mets, leptominingeal dz, encephalitis)  Plan to hold vesicare  Discussed safety at length with pt and family, rec no driving independently, needs close to 24 hr supervision over next 2 weeks to ensure safety as this either improves or situation clarifies  Check UA, labs as above.    Other details as above    Evangelist Mtz MD

## 2025-07-24 LAB
25(OH)D3+25(OH)D2 SERPL-MCNC: 53 NG/ML (ref 30–100)
ALBUMIN SERPL-MCNC: 3.4 G/DL (ref 3.6–5.1)
ALP SERPL-CCNC: 71 U/L (ref 37–153)
ALT SERPL-CCNC: 18 U/L (ref 6–29)
ANION GAP SERPL CALCULATED.4IONS-SCNC: 10 MMOL/L (CALC) (ref 7–17)
AST SERPL-CCNC: 25 U/L (ref 10–35)
BASOPHILS # BLD AUTO: 18 CELLS/UL (ref 0–200)
BASOPHILS NFR BLD AUTO: 0.2 %
BILIRUB SERPL-MCNC: 0.4 MG/DL (ref 0.2–1.2)
BUN SERPL-MCNC: 58 MG/DL (ref 7–25)
CALCIUM SERPL-MCNC: 12.4 MG/DL (ref 8.6–10.4)
CHLORIDE SERPL-SCNC: 102 MMOL/L (ref 98–110)
CO2 SERPL-SCNC: 27 MMOL/L (ref 20–32)
CREAT SERPL-MCNC: 2.84 MG/DL (ref 0.6–0.95)
EGFRCR SERPLBLD CKD-EPI 2021: 15 ML/MIN/1.73M2
EOSINOPHIL # BLD AUTO: 249 CELLS/UL (ref 15–500)
EOSINOPHIL NFR BLD AUTO: 2.8 %
ERYTHROCYTE [DISTWIDTH] IN BLOOD BY AUTOMATED COUNT: 13.1 % (ref 11–15)
GLUCOSE SERPL-MCNC: 123 MG/DL (ref 65–139)
HCT VFR BLD AUTO: 31.4 % (ref 35–45)
HGB BLD-MCNC: 9.7 G/DL (ref 11.7–15.5)
LYMPHOCYTES # BLD AUTO: 303 CELLS/UL (ref 850–3900)
LYMPHOCYTES NFR BLD AUTO: 3.4 %
MAGNESIUM SERPL-MCNC: 1.5 MG/DL (ref 1.5–2.5)
MCH RBC QN AUTO: 29.7 PG (ref 27–33)
MCHC RBC AUTO-ENTMCNC: 30.9 G/DL (ref 32–36)
MCV RBC AUTO: 96 FL (ref 80–100)
MONOCYTES # BLD AUTO: 596 CELLS/UL (ref 200–950)
MONOCYTES NFR BLD AUTO: 6.7 %
NEUTROPHILS # BLD AUTO: 7734 CELLS/UL (ref 1500–7800)
NEUTROPHILS NFR BLD AUTO: 86.9 %
PHOSPHATE SERPL-MCNC: 4 MG/DL (ref 2.1–4.3)
PLATELET # BLD AUTO: 176 THOUSAND/UL (ref 140–400)
PMV BLD REES-ECKER: 11.8 FL (ref 7.5–12.5)
POTASSIUM SERPL-SCNC: 3.8 MMOL/L (ref 3.5–5.3)
PROT SERPL-MCNC: 6.2 G/DL (ref 6.1–8.1)
PTH RELATED PROT SERPL-MCNC: NORMAL NG/L
PTH-INTACT SERPL-MCNC: 9 PG/ML (ref 16–77)
RBC # BLD AUTO: 3.27 MILLION/UL (ref 3.8–5.1)
SODIUM SERPL-SCNC: 139 MMOL/L (ref 135–146)
WBC # BLD AUTO: 8.9 THOUSAND/UL (ref 3.8–10.8)

## 2025-07-28 ENCOUNTER — TELEMEDICINE (OUTPATIENT)
Dept: PRIMARY CARE | Facility: HOSPITAL | Age: 89
End: 2025-07-28
Payer: MEDICARE

## 2025-07-28 ENCOUNTER — TELEPHONE (OUTPATIENT)
Dept: OBSTETRICS AND GYNECOLOGY | Facility: CLINIC | Age: 89
End: 2025-07-28

## 2025-07-28 DIAGNOSIS — N32.81 OVERACTIVE BLADDER: ICD-10-CM

## 2025-07-28 DIAGNOSIS — E83.52 HYPERCALCEMIA OF MALIGNANCY: Primary | ICD-10-CM

## 2025-07-28 PROCEDURE — G2211 COMPLEX E/M VISIT ADD ON: HCPCS | Performed by: PEDIATRICS

## 2025-07-28 PROCEDURE — 99214 OFFICE O/P EST MOD 30 MIN: CPT | Performed by: PEDIATRICS

## 2025-07-28 RX ORDER — CALCIUM CARBONATE 300MG(750)
400 TABLET,CHEWABLE ORAL DAILY
Qty: 5 TABLET | Refills: 0 | Status: CANCELLED | OUTPATIENT
Start: 2025-07-28

## 2025-07-28 NOTE — PROGRESS NOTES
"  [facility]- VIRTUAL VISIT     HPI:  Kajal Null is a 89 y.o. female w/ a PMH s/f  89 y.o. female w/ a PMH s/f stage IV adenocarcinoma of the lung, lymphocytic colitis, laryngeal cancer s/p radiation, CKD stage 4, anemia, s/p R shoulder replacement with recent recurrent malignant pleural effusions, recurrent UTI, stage 4 CKD, HTN, GERD, vaginal atrophy, BLE edema, recently discovered hypercalcemia of malignancy s/p IV denosumab (7/24/25) presenting for a Follow Up Visit for an Established Patient. This visit is a virtual video visit.     Interval History Since Last Visit (per chart review):  Patient was noted on post-visit labs to have elevated calcium of 12.4. We attempted to have her get an pamidronate infusion due to her CKD, but this was not feasible at both [facility] and [facility] last week. As such, our team reached out to patient's [facility] oncologist Dr. Ferrera, and patient received IV NS 1 L and IV denosumab 60 mg instead.     Today Per Patient:  Patient reports feeling more \"with it.\" Her 2 sons (in the same room with her at home) report that since Friday of last week (after her infusion), the patient has had waxing/waning in her mental status. On Friday, she was more somnolent but more alert on Saturday, 7/26/25 with a slight gradual overall improvement up to today.     During the last visit, it was decided that the patient would discontinue her vesicare due to concern that anticholingeric effects of this medication may be contributing to her change in mentation (this was done before her labs had resulted). The patient has since had multiple overnight episodes (~4/night) of urinary incontinence since the vesicare was discontinued.     Per blood pressure checks at home, patient's family reports that she is usually ~130/80. At time of assessment today, patient's BP was 99/59.     Objective   Past Medical History: Medical History[1]    LMP: No LMP recorded. Patient is " postmenopausal.     Allergies: RX Allergies[2]    Surgical History: Surgical History[3]    Family History: Family History[4]    Social history:  reports that she quit smoking about 37 years ago. Her smoking use included cigarettes. She started smoking about 65 years ago. She has never used smokeless tobacco. She reports that she does not currently use alcohol. She reports that she does not use drugs.   Social History     Social History Narrative    Not on file       Medications:Current Medications[5]    Vitals: There were no vitals filed for this visit.    Physical exam (virtual assessment since this was a virtual visit):   Constitutional: Well-developed female in no acute distress. AOx3.     Labs: No results found. However, due to the size of the patient record, not all encounters were searched. Please check Results Review for a complete set of results.    Imaging: Imaging  No results found.    Cardiology, Vascular, and Other Imaging  Lower extremity venous duplex left  Result Date: 7/22/2025            [place] [address]   Tel [phone] and Fax [phone]  Vascular Lab Report [facility] LOWER EXTREMITY VENOUS DUPLEX LEFT  Patient Name:      SULAIMAN SUH        Reading Physician:  [id number] Mark Loja MD, RPVI Study Date:        7/22/2025            Ordering Physician: [id number] BRET BONILLA MRN/PID:           [id number]             Technologist:       Celio Javed RVT Accession#:        [id number]         Technologist 2: Date of Birth/Age: 1935 / 89 years Encounter#:         [id number] Gender:            F Admission Status:  Outpatient           Location Performed: [facility]  Diagnosis/ICD: Acute embolism and thrombosis of unspecified deep veins of right                distal lower extremity-I82.4Z1 CPT Codes:     75526 Peripheral  venous duplex scan for DVT Limited  **CRITICAL RESULT** Critical Result: Acute DVT in right peroneal vein. Notification called to Evangelist Mtz MD on 7/22/2025 at 3:30:00 PM. Acknowledged critical results notification communicated via Secure chat by Celio Javed RVT.  CONCLUSIONS: Right Lower Venous: There is acute non-occlusive deep vein thrombosis visualized in the peroneal vein. The remainder of the right leg is negative for deep vein thrombosis. Left Lower Venous: The left common femoral vein demonstrates normal spontaneous and respirophasic flow.  Imaging & Doppler Findings:  Right                 Compressible      Thrombus              Flow Distal External Iliac                     None         Spontaneous/Phasic CFV                       Yes             None         Spontaneous/Phasic PFV                       Yes             None FV Proximal               Yes             None         Spontaneous/Phasic FV Mid                    Yes             None FV Distal                 Yes             None Popliteal                 Yes             None         Spontaneous/Phasic Peroneal                Partial    Acute non-occlusive PTV                       Yes             None  Left        Flow CFV  Spontaneous/Phasic  [id number] Mark Loja MD, RPVI Electronically signed by [id number] Mark Loja MD, RPVI on 7/22/2025 at 10:43:59 PM  ** Final **         Assessment and Plan   Kajal R Prospect is a 89 y.o. female w/ a PM s/f Kajal R Prospect is a 89 y.o. female w/ a PMH s/f  89 y.o. female w/ a PMH s/f stage IV adenocarcinoma of the lung, lymphocytic colitis, laryngeal cancer s/p radiation, CKD stage 4, anemia, s/p R shoulder replacement with recent recurrent malignant pleural effusions, recurrent UTI, stage 4 CKD, HTN, GERD, vaginal atrophy, BLE edema, recently discovered hypercalcemia of malignancy s/p IV denosumab (7/24/25) presenting for a Follow Up Visit for an Established Patient. Patient has had some improvement in her  mental status following treatment of hypercalcemia, but she has developed urinary incontinence after vesicare discontinuation.     Updates 7/28/25  - patient received 1L NS and IV denosumab 60 mg on 7/24/25 with some improvement in mental status since this was given. It will likely take effect several weeks for her hypercalcemia to resolve, but patient will receive RFP on 7/30/25 at [facility], and we will follow her Ca2+ from this  - patient has hypomagnesemia, so prescribed 5 days x PO MgO 400 mg   - encouraged adequate water intake for management of hypercalcemia  - discontinued PO lasix 20 mg BID to avoid side effects of dehydration (patient herself was reportedly actually only taking lasix 20 mg daily), but if BP worsens or if patient gains weight, okay to restart lasix  - plan for repeat virtual visit in ~ 2 weeks     #Hypercalcemia of malignancy  - Calcium was 12.4 on 7/23/25 (Alb of 3.4)  - patient received 1L NS and IV denosumab 60 mg on 7/24/25 at [facility]  - patient has had mild improvement in mentation but still waxing/waning  Plan:   - patient to obtain repeat labs on Wed, 7/30/25 at [facility]- will follow for this  - plan for repeat virtual visit in ~2 weeks     #RLE peroneal DVT  - confirmed on vascular lab US on 7/22/25  - patient is not taking eliquis   - obtain repeat vascular lab US this week week      #HTN  #Chronic BLE edema   - not taking spironolactone 25 mg daily   - discontinue lasix 20 mg BID, but okay to restart if patient becomes hypertensive at home or develops worsening LE edema      #stage 4 CKD  - CTM  - obtaining RFP on 7/30/25     #stage IV adenocarcinoma of the lung c/b pleural effusions   #laryngeal cancer s/p radiation   - on 40 mg osimertinib daily   - following with Dr. Ferrera at [facility]    #Hypomagnesemia   - Mg level on 7/23/25 was 1.5  - prescribed 5 days x  mg MgO (called in to pharmacy)     #Overactive bladder  - vesicare 5 mg started a few weeks ago for overactive bladder but  discontinued following discussion with uro-gynecologist Dr. Chapa  - will restart today due to low suspicion for anti-cholinergic effects being the driving etiology of patient's mentation changes     #Lymphocytic colitis  #GERD  - famotidine 20 mg nightly     #Anemia   - CTM  - CBC on 7/30/25    Follow-up in 2 weeks for virtual visit.    Patient and plan discussed with attending physician Dr. Mtz.    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    Change in bowel habit 09/13/2016    Increased bowel frequency    Chronic kidney disease     Chronic kidney disease, stage 3 unspecified (Multi) 06/12/2022    Chronic kidney disease (CKD), stage III (moderate)    Clotting disorder (Multi)     Diarrhea, unspecified 10/09/2017    Diarrhea, unspecified type    Diverticulitis of large intestine without perforation or abscess without bleeding 10/11/2017    Diverticulitis of colon    Diverticulosis of large intestine without perforation or abscess without bleeding 10/09/2017    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 bleeding    Dysphonia 04/28/2016    Dysphonia    Eczema     Essential (primary) hypertension 01/26/2019    Benign essential hypertension    Gastro-esophageal reflux disease without esophagitis 02/02/2020    GERD (gastroesophageal reflux disease)    Heart murmur     Nonrheumatic aortic (valve) insufficiency 12/07/2015    Nonrheumatic aortic valve insufficiency    Other conditions influencing health status     Osteoarthritis    Other fecal abnormalities  07/28/2015    Loose stools    Other specified noninflammatory disorders of vagina 07/17/2020    Vaginal irritation    Other specified noninflammatory disorders of vagina 05/25/2022    Vaginal irritation    Other specified symptoms and signs involving the circulatory and respiratory systems 04/28/2016    Abnormal lung sounds    Personal history of diseases of the blood and blood-forming organs and certain disorders involving the immune mechanism     History of anemia    Personal history of other diseases of the circulatory system     History of hypertension    Personal history of other diseases of the circulatory system     History of mitral valve prolapse    Personal history of other diseases of the digestive system     History of esophageal reflux    Personal history of other diseases of the respiratory system 12/19/2013    Personal history of asthma    Personal history of other endocrine, nutritional and metabolic disease 12/19/2013    History of hyperlipidemia    Primary osteoarthritis, unspecified shoulder 12/08/2014    Shoulder arthritis    Radiculopathy, lumbosacral region 05/07/2019    Lumbosacral radiculopathy at L4    Unilateral primary osteoarthritis, left knee 06/05/2017    Arthritis of left knee    Urinary tract infection     Varicella    [2]   Allergies  Allergen Reactions    Alendronate Other    Amoxicillin-Pot Clavulanate Diarrhea    Codeine GI intolerance, GI Upset and Hives    Dexlansoprazole Nausea/vomiting and Nausea Only    Diclofenac Diarrhea    House Dust Unknown    Mold Unknown    Nitrofurantoin GI Upset    Trimethoprim Diarrhea    Sulfa (Sulfonamide Antibiotics) Rash   [3]   Past Surgical History:  Procedure Laterality Date    ADENOIDECTOMY      CATARACT EXTRACTION  09/04/2015    Cataract Surgery    COLONOSCOPY  09/04/2015    Complete Colonoscopy    OTHER SURGICAL HISTORY  09/04/2015    Direct Laryngoscopy With Stripping Of Vocal Cords    OTHER SURGICAL HISTORY  09/04/2015    Vocal Cordectomy     SHOULDER SURGERY  09/24/2013    Shoulder Surgery    TONSILLECTOMY  09/04/2015    Tonsillectomy   [4] No family history on file.  [5]   Current Outpatient Medications:     acetaminophen (Tylenol 8 HOUR) 650 mg ER tablet, Take 1 tablet (650 mg) by mouth every 8 hours. Do not crush, chew, or split., Disp: , Rfl:     albuterol 90 mcg/actuation inhaler, Inhale 2 puffs every 4 hours if needed for shortness of breath. (Patient not taking: Reported on 5/6/2025), Disp: 18 g, Rfl: 2    apixaban (Eliquis) 5 mg (74 tabs) tablet, Take 2 tablets (10 mg) by mouth 2 times a day for 7 days, then take 1 tablet (5 mg) by mouth 2 times a day., Disp: 74 tablet, Rfl: 0    cephalexin (Keflex) 250 mg capsule, TAKE 1 CAPSULE BY MOUTH EVERY DAY (Patient not taking: Reported on 5/6/2025), Disp: 90 capsule, Rfl: 3    cetirizine (ZyrTEC) 10 mg tablet, Take 1 tablet (10 mg) by mouth once daily., Disp: , Rfl:     cholecalciferol (Vitamin D-3) 50 mcg (2,000 unit) capsule, Take 1 capsule (50 mcg) by mouth early in the morning.., Disp: , Rfl:     clobetasol (Temovate) 0.05 % cream, Apply 1 Application topically 2 times a day as needed (irritation)., Disp: , Rfl:     cranberry fruit 465 mg capsule, Take 465 mg by mouth early in the morning.., Disp: , Rfl:     DULoxetine (Cymbalta) 30 mg DR capsule, Take 1 capsule (30 mg) by mouth once daily. Do not crush or chew. (Patient not taking: Reported on 5/6/2025), Disp: 30 capsule, Rfl: 11    estradiol (Estrace) 0.01 % (0.1 mg/gram) vaginal cream, Place a pea sized amount vaginally 3 times a week, Disp: 42.5 g, Rfl: 2    famotidine (Pepcid) 20 mg tablet, Take 1 tablet (20 mg) by mouth once daily at bedtime., Disp: 90 tablet, Rfl: 3    furosemide (Lasix) 20 mg tablet, Take 1 tablet (20 mg) by mouth 2 times daily (morning and late afternoon)., Disp: 60 tablet, Rfl: 1    inhalational spacing device inhaler, Use as directed with inhalers, Disp: 1 each, Rfl: 0    mirtazapine (Remeron) 7.5 mg tablet, Take 1  tablet (7.5 mg) by mouth once daily at bedtime., Disp: 90 tablet, Rfl: 3    osimertinib (Tagrisso) 80 mg tablet, Take 1 tablet (80 mg total) by mouth once daily. (Patient taking differently: Take 40 mg by mouth once daily.), Disp: , Rfl:

## 2025-07-28 NOTE — PROGRESS NOTES
I saw and evaluated the patient. I personally obtained the key and critical portions of the history and physical exam or was physically present for key and critical portions performed by the resident/fellow. I reviewed the resident/fellow's documentation and discussed the patient with the resident/fellow. I agree with the resident/fellow's medical decision making as documented in the note.    Evangelist Mtz MD

## 2025-07-28 NOTE — TELEPHONE ENCOUNTER
Andrea Thomas communicated that pt has adverse side effects probably from solifenacin. It previously was reported that Mybetric wasn't working, though things seem to be much worse since it was stopped; therefore will send rx per Dr Woods until pt can follow-up with Dr Chapa.

## 2025-07-29 LAB
25(OH)D3+25(OH)D2 SERPL-MCNC: 53 NG/ML (ref 30–100)
ALBUMIN SERPL-MCNC: 3.4 G/DL (ref 3.6–5.1)
ALP SERPL-CCNC: 71 U/L (ref 37–153)
ALT SERPL-CCNC: 18 U/L (ref 6–29)
ANION GAP SERPL CALCULATED.4IONS-SCNC: 10 MMOL/L (CALC) (ref 7–17)
AST SERPL-CCNC: 25 U/L (ref 10–35)
BASOPHILS # BLD AUTO: 18 CELLS/UL (ref 0–200)
BASOPHILS NFR BLD AUTO: 0.2 %
BILIRUB SERPL-MCNC: 0.4 MG/DL (ref 0.2–1.2)
BUN SERPL-MCNC: 58 MG/DL (ref 7–25)
CALCIUM SERPL-MCNC: 12.4 MG/DL (ref 8.6–10.4)
CHLORIDE SERPL-SCNC: 102 MMOL/L (ref 98–110)
CO2 SERPL-SCNC: 27 MMOL/L (ref 20–32)
CREAT SERPL-MCNC: 2.84 MG/DL (ref 0.6–0.95)
EGFRCR SERPLBLD CKD-EPI 2021: 15 ML/MIN/1.73M2
EOSINOPHIL # BLD AUTO: 249 CELLS/UL (ref 15–500)
EOSINOPHIL NFR BLD AUTO: 2.8 %
ERYTHROCYTE [DISTWIDTH] IN BLOOD BY AUTOMATED COUNT: 13.1 % (ref 11–15)
GLUCOSE SERPL-MCNC: 123 MG/DL (ref 65–139)
HCT VFR BLD AUTO: 31.4 % (ref 35–45)
HGB BLD-MCNC: 9.7 G/DL (ref 11.7–15.5)
LYMPHOCYTES # BLD AUTO: 303 CELLS/UL (ref 850–3900)
LYMPHOCYTES NFR BLD AUTO: 3.4 %
MAGNESIUM SERPL-MCNC: 1.5 MG/DL (ref 1.5–2.5)
MCH RBC QN AUTO: 29.7 PG (ref 27–33)
MCHC RBC AUTO-ENTMCNC: 30.9 G/DL (ref 32–36)
MCV RBC AUTO: 96 FL (ref 80–100)
MONOCYTES # BLD AUTO: 596 CELLS/UL (ref 200–950)
MONOCYTES NFR BLD AUTO: 6.7 %
NEUTROPHILS # BLD AUTO: 7734 CELLS/UL (ref 1500–7800)
NEUTROPHILS NFR BLD AUTO: 86.9 %
PHOSPHATE SERPL-MCNC: 4 MG/DL (ref 2.1–4.3)
PLATELET # BLD AUTO: 176 THOUSAND/UL (ref 140–400)
PMV BLD REES-ECKER: 11.8 FL (ref 7.5–12.5)
POTASSIUM SERPL-SCNC: 3.8 MMOL/L (ref 3.5–5.3)
PROT SERPL-MCNC: 6.2 G/DL (ref 6.1–8.1)
PTH RELATED PROT SERPL-MCNC: 14 PG/ML (ref 11–20)
PTH-INTACT SERPL-MCNC: 9 PG/ML (ref 16–77)
RBC # BLD AUTO: 3.27 MILLION/UL (ref 3.8–5.1)
SODIUM SERPL-SCNC: 139 MMOL/L (ref 135–146)
WBC # BLD AUTO: 8.9 THOUSAND/UL (ref 3.8–10.8)

## 2025-07-29 RX ORDER — MIRABEGRON 25 MG/1
25 TABLET, FILM COATED, EXTENDED RELEASE ORAL DAILY
Qty: 30 TABLET | Refills: 2 | Status: SHIPPED | OUTPATIENT
Start: 2025-07-29

## 2025-08-01 ENCOUNTER — TELEPHONE (OUTPATIENT)
Dept: OBSTETRICS AND GYNECOLOGY | Facility: CLINIC | Age: 89
End: 2025-08-01
Payer: MEDICARE

## 2025-08-01 DIAGNOSIS — R60.9 EDEMA, UNSPECIFIED TYPE: ICD-10-CM

## 2025-08-01 NOTE — TELEPHONE ENCOUNTER
Spoke to son William, he will coordinate with his brother here in [place] to get the pt scheduled for a follow-up visit/med check.

## 2025-08-04 ENCOUNTER — APPOINTMENT (OUTPATIENT)
Dept: VASCULAR MEDICINE | Facility: CLINIC | Age: 89
End: 2025-08-04
Payer: MEDICARE

## 2025-08-04 RX ORDER — FUROSEMIDE 20 MG/1
20 TABLET ORAL
Qty: 60 TABLET | Refills: 1 | Status: SHIPPED | OUTPATIENT
Start: 2025-08-04

## 2025-08-07 ENCOUNTER — APPOINTMENT (OUTPATIENT)
Dept: NEPHROLOGY | Facility: CLINIC | Age: 89
End: 2025-08-07
Payer: MEDICARE

## 2025-08-07 VITALS
BODY MASS INDEX: 23.08 KG/M2 | DIASTOLIC BLOOD PRESSURE: 69 MMHG | SYSTOLIC BLOOD PRESSURE: 127 MMHG | HEART RATE: 68 BPM | HEIGHT: 57 IN | TEMPERATURE: 97.4 F | WEIGHT: 107 LBS | OXYGEN SATURATION: 96 %

## 2025-08-07 DIAGNOSIS — I13.0 HYPERTENSIVE HEART AND CHRONIC KIDNEY DISEASE WITH HEART FAILURE AND STAGE 1 THROUGH STAGE 4 CHRONIC KIDNEY DISEASE, OR UNSPECIFIED CHRONIC KIDNEY DISEASE: ICD-10-CM

## 2025-08-07 DIAGNOSIS — I50.30 HEART FAILURE WITH PRESERVED EJECTION FRACTION, UNSPECIFIED HF CHRONICITY: ICD-10-CM

## 2025-08-07 DIAGNOSIS — I27.20 PULMONARY HYPERTENSION (MULTI): ICD-10-CM

## 2025-08-07 DIAGNOSIS — D63.1 ANEMIA DUE TO STAGE 4 CHRONIC KIDNEY DISEASE: ICD-10-CM

## 2025-08-07 DIAGNOSIS — E83.52 HYPERCALCEMIA: Primary | ICD-10-CM

## 2025-08-07 DIAGNOSIS — J84.10 PULMONARY FIBROSIS, UNSPECIFIED (MULTI): ICD-10-CM

## 2025-08-07 DIAGNOSIS — I50.32 CHRONIC DIASTOLIC (CONGESTIVE) HEART FAILURE: ICD-10-CM

## 2025-08-07 DIAGNOSIS — N18.4 ANEMIA DUE TO STAGE 4 CHRONIC KIDNEY DISEASE: ICD-10-CM

## 2025-08-07 DIAGNOSIS — N18.4 CKD (CHRONIC KIDNEY DISEASE) STAGE 4, GFR 15-29 ML/MIN (MULTI): ICD-10-CM

## 2025-08-07 DIAGNOSIS — E55.9 VITAMIN D DEFICIENCY: ICD-10-CM

## 2025-08-07 PROCEDURE — 3074F SYST BP LT 130 MM HG: CPT | Performed by: INTERNAL MEDICINE

## 2025-08-07 PROCEDURE — 1125F AMNT PAIN NOTED PAIN PRSNT: CPT | Performed by: INTERNAL MEDICINE

## 2025-08-07 PROCEDURE — 1159F MED LIST DOCD IN RCRD: CPT | Performed by: INTERNAL MEDICINE

## 2025-08-07 PROCEDURE — 1160F RVW MEDS BY RX/DR IN RCRD: CPT | Performed by: INTERNAL MEDICINE

## 2025-08-07 PROCEDURE — 3078F DIAST BP <80 MM HG: CPT | Performed by: INTERNAL MEDICINE

## 2025-08-07 PROCEDURE — 99214 OFFICE O/P EST MOD 30 MIN: CPT | Performed by: INTERNAL MEDICINE

## 2025-08-07 ASSESSMENT — PAIN SCALES - GENERAL: PAINLEVEL_OUTOF10: 6

## 2025-08-07 NOTE — PROGRESS NOTES
Subjective   Kajal Null is a 89 y.o. female who presented today to discuss her progressive, now stage 4 chronic kidney disease of somewhat unclear etiology.  She had hyponatremia when I first met her in the hospital, suffered many acute kidney injuries when we would use diuretics for her lower extremity edema.  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  She had a vocal cord malignancy in 2004, recurrences in 2005, 2017, and 2023.  She has osteoarthritis without frequent anti-inflammatory use, history of hemorrhoids, reflux.  We had contemplated a kidney biopsy in the past but she typically enjoyed recovery and was stable, lack significant protein in the urine.  But in 2023 she was diagnosed with adenocarcinoma of the lung.  She has been treated with osimertinib, her most recent PET scan found no active disease in the head, neck, chest.  There was uptake in the pancreatic tail but an MRI look more consistent with IPMN.  I spoke with her son William who is a physician in [place], I spoke with her son Scotty today, he is here in [place].  I am afraid she has had more cognitive difficulties.  She is up all night with overactive bladder.  She is seeing Dr. Chapa.    ROS  As in Subjective, all other ROS are negative    Objective     Vital signs    Visit Vitals  /69 (BP Location: Right arm, Patient Position: Sitting, BP Cuff Size: Adult)   Pulse 68   Temp 36.3 °C (97.4 °F) (Oral)      Vitals:    08/07/25 1143   Weight: 48.5 kg (107 lb)        Physical Exam  Constitutional:       Appearance: Normal appearance.   HENT:      Mouth/Throat:      Mouth: Mucous membranes are moist.   Eyes:      Extraocular Movements: Extraocular movements intact.      Pupils: Pupils are equal, round, and reactive to light.   Cardiovascular:      Rate and Rhythm: Regular rhythm. Sys mumrur     Heart sounds: S1 normal and S2 normal.   Pulmonary:      Breath sounds: crackles.   Abdominal:      Comments:  "Soft, NT/ND, no masses, normal bowel sounds   Genitourinary:     Comments: No bhardwaj  Musculoskeletal:      No synovitis  Edema:  1+ LE edema  Skin:     General: Skin is warm and dry.   Neurological:      General: No focal deficit present.      Mental Status: She is alert and oriented to person, place, and time.   Psychiatric:         Behavior: Behavior normal.      Meds  Current Medications[1]     Allergies  RX Allergies[1]     Results  Lab Results   Component Value Date    GLUCOSE 123 07/23/2025     07/23/2025    K 3.8 07/23/2025     07/23/2025    CO2 27 07/23/2025    ANIONGAP 10 07/23/2025    BUN 58 (H) 07/23/2025    CREATININE 2.84 (H) 07/23/2025    GFRF 26 (A) 08/22/2023    CALCIUM 12.4 (H) 07/23/2025    MG 1.5 07/23/2025     Lab Results   Component Value Date    PTH 9 (L) 07/23/2025    CALCIUM 12.4 (H) 07/23/2025     No results found for: \"ALBUR\", \"TBX86FTN\"         @LABALLVALUEIP(CREATININE:*)@  @LABALLVALUEIP(NA:*)@    Imaging results  === 10/25/24 ===    US THORACENTESIS    - Impression -  Uneventful thoracentesis, as detailed above: Right Pleural space, 500  mL    Performed and dictated at [facility].    Signed by: Lizbeth Escamilla 10/29/2024 1:12 PM  Dictation workstation:   [id number]     Assessment and Plan  Kajal Null has progressive stage 4 chronic kidney disease of somewhat unclear etiology as far as I am concerned.  We had done genetic testing which was unremarkable.  When checked on July 23 creatinine was 2.84 mg/dL which is slightly higher than her prior baseline but she was as high as 2.95 mg/dL back in January of this year, 3.74 in February, 2.78 on July 24 and 3.07 on July 30.  She has no acidosis, electrolytes have looked okay until recently when her calcium was 12.4 mg/dL on July, was still 12 on July 30.  Her corrected calcium is close to 13 and this could account for her cognitive changes.  For completeness I will check a 1, 25 vitamin D.  " PTH related peptide was within normal limits, intact PTH was appropriately suppressed.    She has anemia for which she is on an LASHON now.  No changes from the renal perspective but I want to make certain that we addressed the hypercalcemia.  Given her low intact PTH I am not certain that a calcium emetic would help.  Calcitonin would help only briefly.  We could consider a bisphosphonate.       Problem List Items Addressed This Visit     CKD (chronic kidney disease) stage 4, GFR 15-29 ml/min (Multi)    Relevant Orders    Renal Function Panel    CBC and Auto Differential    Follow Up In Nephrology    Anemia    Relevant Orders    Renal Function Panel    CBC and Auto Differential    Follow Up In Nephrology   Other Visit Diagnoses       Hypercalcemia    -  Primary    Relevant Orders    Vitamin D 1,25 Dihydroxy (for eval of hypercalcemia)      Vitamin D deficiency        Relevant Orders    Renal Function Panel    CBC and Auto Differential    Follow Up In Nephrology         Killian Madrigal MD             [1]    Current Outpatient Medications:   •  acetaminophen (Tylenol 8 HOUR) 650 mg ER tablet, Take 1 tablet (650 mg) by mouth every 8 hours. Do not crush, chew, or split., Disp: , Rfl:   •  apixaban (Eliquis) 5 mg (74 tabs) tablet, Take 2 tablets (10 mg) by mouth 2 times a day for 7 days, then take 1 tablet (5 mg) by mouth 2 times a day., Disp: 74 tablet, Rfl: 0  •  cetirizine (ZyrTEC) 10 mg tablet, Take 1 tablet (10 mg) by mouth once daily., Disp: , Rfl:   •  cholecalciferol (Vitamin D-3) 50 mcg (2,000 unit) capsule, Take 1 capsule (50 mcg) by mouth early in the morning.., Disp: , Rfl:   •  clobetasol (Temovate) 0.05 % cream, Apply 1 Application topically 2 times a day as needed (irritation)., Disp: , Rfl:   •  cranberry fruit 465 mg capsule, Take 465 mg by mouth early in the morning.., Disp: , Rfl:   •  estradiol (Estrace) 0.01 % (0.1 mg/gram) vaginal cream, Place a pea sized amount vaginally 3 times a week, Disp: 42.5 g,  Rfl: 2  •  famotidine (Pepcid) 20 mg tablet, Take 1 tablet (20 mg) by mouth once daily at bedtime., Disp: 90 tablet, Rfl: 3  •  furosemide (Lasix) 20 mg tablet, TAKE 1 TABLET (20 MG) BY MOUTH 2 TIMES DAILY (MORNING AND LATE AFTERNOON)., Disp: 60 tablet, Rfl: 1  •  inhalational spacing device inhaler, Use as directed with inhalers, Disp: 1 each, Rfl: 0  •  mirtazapine (Remeron) 7.5 mg tablet, Take 1 tablet (7.5 mg) by mouth once daily at bedtime., Disp: 90 tablet, Rfl: 3  •  osimertinib (Tagrisso) 80 mg tablet, Take 1 tablet (80 mg total) by mouth once daily. (Patient taking differently: Take 40 mg by mouth once daily.), Disp: , Rfl:   •  albuterol 90 mcg/actuation inhaler, Inhale 2 puffs every 4 hours if needed for shortness of breath. (Patient not taking: Reported on 8/7/2025), Disp: 18 g, Rfl: 2[1]  Allergies  Allergen Reactions   • Alendronate Other   • Amoxicillin-Pot Clavulanate Diarrhea   • Codeine GI intolerance, GI Upset and Hives   • Dexlansoprazole Nausea/vomiting and Nausea Only   • Diclofenac Diarrhea   • House Dust Unknown   • Mold Unknown   • Nitrofurantoin GI Upset   • Trimethoprim Diarrhea   • Sulfa (Sulfonamide Antibiotics) Rash   Patient was identified as a fall risk. Risk prevention instructions provided.

## 2025-08-11 ENCOUNTER — TELEMEDICINE (OUTPATIENT)
Dept: PRIMARY CARE | Facility: HOSPITAL | Age: 89
End: 2025-08-11
Payer: MEDICARE

## 2025-08-11 DIAGNOSIS — E83.52 HYPERCALCEMIA OF MALIGNANCY: Primary | ICD-10-CM

## 2025-08-11 PROCEDURE — G2211 COMPLEX E/M VISIT ADD ON: HCPCS

## 2025-08-11 PROCEDURE — 99213 OFFICE O/P EST LOW 20 MIN: CPT

## 2025-08-15 DIAGNOSIS — C34.31 MALIGNANT NEOPLASM OF LOWER LOBE OF RIGHT LUNG (MULTI): ICD-10-CM

## 2025-08-15 DIAGNOSIS — N18.4 CKD (CHRONIC KIDNEY DISEASE) STAGE 4, GFR 15-29 ML/MIN (MULTI): ICD-10-CM

## 2025-08-15 DIAGNOSIS — I50.32 CHRONIC DIASTOLIC (CONGESTIVE) HEART FAILURE: Primary | ICD-10-CM

## 2025-08-15 DIAGNOSIS — E83.52 HYPERCALCEMIA OF MALIGNANCY: ICD-10-CM

## 2025-08-16 ENCOUNTER — DOCUMENTATION (OUTPATIENT)
Dept: HOME HEALTH SERVICES | Facility: HOME HEALTH | Age: 89
End: 2025-08-16
Payer: MEDICARE

## 2025-08-16 ENCOUNTER — HOME HEALTH ADMISSION (OUTPATIENT)
Dept: HOME HEALTH SERVICES | Facility: HOME HEALTH | Age: 89
End: 2025-08-16
Payer: MEDICARE

## 2025-08-17 ENCOUNTER — HOME CARE VISIT (OUTPATIENT)
Dept: HOME HEALTH SERVICES | Facility: HOME HEALTH | Age: 89
End: 2025-08-17
Payer: MEDICARE

## 2025-08-17 VITALS — BODY MASS INDEX: 22.67 KG/M2 | WEIGHT: 108 LBS | OXYGEN SATURATION: 95 % | HEIGHT: 58 IN | HEART RATE: 64 BPM

## 2025-08-17 PROCEDURE — G0151 HHCP-SERV OF PT,EA 15 MIN: HCPCS | Mod: HHH

## 2025-08-17 PROCEDURE — 169592 NO-PAY CLAIM PROCEDURE

## 2025-08-17 SDOH — HEALTH STABILITY: PHYSICAL HEALTH: EXERCISE TYPE: NOTHING IN PLACE

## 2025-08-17 ASSESSMENT — ENCOUNTER SYMPTOMS
DIZZINESS: 1
PERSON REPORTING PAIN: PATIENT
DEPRESSION: 1
PAIN: 1
LOWEST PAIN SEVERITY IN PAST 24 HOURS: 6/10
ANGER WITHIN DEFINED LIMITS: 1
PAIN LOCATION - PAIN FREQUENCY: CONSTANT
PAIN LOCATION: COCCYX
LOSS OF SENSATION IN FEET: 1
DYSPNEA ON EXERTION: 1
AGGRESSION WITHIN DEFINED LIMITS: 1
PAIN SEVERITY GOAL: 0/10
SLEEP QUALITY: ADEQUATE
PAIN LOCATION - EXACERBATING FACTORS: SITTING
HIGHEST PAIN SEVERITY IN PAST 24 HOURS: 7/10
PAIN LOCATION - PAIN SEVERITY: 7/10
LOWER EXTREMITY EDEMA: 1
FATIGUES EASILY: 1
SUBJECTIVE PAIN PROGRESSION: RESOLVED
OCCASIONAL FEELINGS OF UNSTEADINESS: 1
SHORTNESS OF BREATH: T

## 2025-08-17 ASSESSMENT — ACTIVITIES OF DAILY LIVING (ADL)
PHYSICAL TRANSFERS ASSESSED: 1
PHYSICAL_TRANSFERS_DEVICES: NO AD
OASIS_M1830: 05
AMBULATION ASSISTANCE: SUPERVISION
AMBULATION_DISTANCE/DURATION_TOLERATED: 25 FT
AMBULATION ASSISTANCE ON FLAT SURFACES: 1
AMBULATION ASSISTANCE: 1
CURRENT_FUNCTION: SUPERVISION
ENTERING_EXITING_HOME: MINIMUM ASSIST

## 2025-08-18 ENCOUNTER — TELEPHONE (OUTPATIENT)
Dept: OBSTETRICS AND GYNECOLOGY | Facility: CLINIC | Age: 89
End: 2025-08-18
Payer: MEDICARE

## 2025-08-21 ENCOUNTER — HOME CARE VISIT (OUTPATIENT)
Dept: HOME HEALTH SERVICES | Facility: HOME HEALTH | Age: 89
End: 2025-08-21
Payer: MEDICARE

## 2025-08-21 VITALS
DIASTOLIC BLOOD PRESSURE: 80 MMHG | OXYGEN SATURATION: 96 % | SYSTOLIC BLOOD PRESSURE: 140 MMHG | RESPIRATION RATE: 16 BRPM

## 2025-08-21 PROCEDURE — G0152 HHCP-SERV OF OT,EA 15 MIN: HCPCS | Mod: HHH

## 2025-08-21 PROCEDURE — G0151 HHCP-SERV OF PT,EA 15 MIN: HCPCS | Mod: HHH

## 2025-08-21 SDOH — ECONOMIC STABILITY: HOUSING INSECURITY
HOME SAFETY: POOR SAFETY AWARENESS WITH LB DRESSING AND DIFFICULTY WITH PULLING UP PANTS FROM TOILET AND GRABBING SHOWER HANDEL DOORS INSTAED OF GRAB BAR

## 2025-08-21 ASSESSMENT — ACTIVITIES OF DAILY LIVING (ADL)
TOILETING: MINIMUM ASSIST
BATHING_CURRENT_FUNCTION: MINIMUM ASSIST
TOILETING: 1
BATHING ASSESSED: 1
DRESSING_LB_CURRENT_FUNCTION: MODERATE ASSIST
PREPARING MEALS: NEEDS ASSISTANCE

## 2025-08-21 ASSESSMENT — ENCOUNTER SYMPTOMS
PAIN: 1
PERSON REPORTING PAIN: PATIENT

## 2025-08-25 ENCOUNTER — APPOINTMENT (OUTPATIENT)
Dept: OBSTETRICS AND GYNECOLOGY | Facility: CLINIC | Age: 89
End: 2025-08-25
Payer: MEDICARE

## 2025-08-25 DIAGNOSIS — N32.81 OVERACTIVE BLADDER: Primary | ICD-10-CM

## 2025-08-26 ENCOUNTER — HOME CARE VISIT (OUTPATIENT)
Dept: HOME HEALTH SERVICES | Facility: HOME HEALTH | Age: 89
End: 2025-08-26
Payer: MEDICARE

## 2025-08-26 PROCEDURE — G0151 HHCP-SERV OF PT,EA 15 MIN: HCPCS | Mod: HHH

## 2025-08-26 ASSESSMENT — PAIN DESCRIPTION - PAIN TYPE: TYPE: CHRONIC PAIN

## 2025-08-28 ENCOUNTER — HOME CARE VISIT (OUTPATIENT)
Dept: HOME HEALTH SERVICES | Facility: HOME HEALTH | Age: 89
End: 2025-08-28
Payer: MEDICARE

## 2025-08-28 PROCEDURE — G0152 HHCP-SERV OF OT,EA 15 MIN: HCPCS | Mod: HHH

## 2025-08-28 ASSESSMENT — ENCOUNTER SYMPTOMS
PAIN: 1
HIGHEST PAIN SEVERITY IN PAST 24 HOURS: 7/10
PERSON REPORTING PAIN: PATIENT
LOWEST PAIN SEVERITY IN PAST 24 HOURS: 4/10
PAIN LOCATION: COCCYX

## 2025-08-29 ENCOUNTER — HOME CARE VISIT (OUTPATIENT)
Dept: HOME HEALTH SERVICES | Facility: HOME HEALTH | Age: 89
End: 2025-08-29
Payer: MEDICARE

## 2025-08-29 VITALS — SYSTOLIC BLOOD PRESSURE: 153 MMHG | DIASTOLIC BLOOD PRESSURE: 70 MMHG | HEART RATE: 63 BPM

## 2025-08-29 PROCEDURE — G0151 HHCP-SERV OF PT,EA 15 MIN: HCPCS | Mod: HHH

## 2025-09-02 ENCOUNTER — HOME CARE VISIT (OUTPATIENT)
Dept: HOME HEALTH SERVICES | Facility: HOME HEALTH | Age: 89
End: 2025-09-02
Payer: MEDICARE

## 2025-09-02 VITALS — DIASTOLIC BLOOD PRESSURE: 76 MMHG | HEART RATE: 65 BPM | SYSTOLIC BLOOD PRESSURE: 155 MMHG

## 2025-09-02 PROCEDURE — G0151 HHCP-SERV OF PT,EA 15 MIN: HCPCS | Mod: HHH

## 2025-09-02 ASSESSMENT — PAIN DESCRIPTION - PAIN TYPE: TYPE: CHRONIC PAIN

## 2025-09-04 ENCOUNTER — HOME CARE VISIT (OUTPATIENT)
Dept: HOME HEALTH SERVICES | Facility: HOME HEALTH | Age: 89
End: 2025-09-04
Payer: MEDICARE

## 2025-09-04 ENCOUNTER — OFFICE VISIT (OUTPATIENT)
Dept: PRIMARY CARE | Facility: HOSPITAL | Age: 89
End: 2025-09-04
Payer: MEDICARE

## 2025-09-04 VITALS
HEIGHT: 58 IN | SYSTOLIC BLOOD PRESSURE: 157 MMHG | DIASTOLIC BLOOD PRESSURE: 78 MMHG | HEART RATE: 68 BPM | TEMPERATURE: 98.1 F | WEIGHT: 117.9 LBS | BODY MASS INDEX: 24.75 KG/M2 | OXYGEN SATURATION: 98 %

## 2025-09-04 DIAGNOSIS — M53.3 COCCYX PAIN: Primary | ICD-10-CM

## 2025-09-04 DIAGNOSIS — C34.31 MALIGNANT NEOPLASM OF LOWER LOBE OF RIGHT LUNG (MULTI): ICD-10-CM

## 2025-09-04 DIAGNOSIS — M53.3 COCCYX PAIN: ICD-10-CM

## 2025-09-04 PROCEDURE — 99214 OFFICE O/P EST MOD 30 MIN: CPT

## 2025-09-04 PROCEDURE — 1125F AMNT PAIN NOTED PAIN PRSNT: CPT

## 2025-09-04 PROCEDURE — G0152 HHCP-SERV OF OT,EA 15 MIN: HCPCS | Mod: HHH

## 2025-09-04 PROCEDURE — 1159F MED LIST DOCD IN RCRD: CPT

## 2025-09-04 PROCEDURE — 99212 OFFICE O/P EST SF 10 MIN: CPT

## 2025-09-04 PROCEDURE — G0151 HHCP-SERV OF PT,EA 15 MIN: HCPCS | Mod: HHH

## 2025-09-04 PROCEDURE — 3078F DIAST BP <80 MM HG: CPT

## 2025-09-04 PROCEDURE — 3077F SYST BP >= 140 MM HG: CPT

## 2025-09-04 RX ORDER — OXYCODONE HYDROCHLORIDE 5 MG/1
2.5 TABLET ORAL 3 TIMES DAILY PRN
Qty: 45 TABLET | Refills: 0 | Status: SHIPPED | OUTPATIENT
Start: 2025-09-04 | End: 2025-10-04

## 2025-09-04 RX ORDER — POLYETHYLENE GLYCOL 3350 17 G/17G
17 POWDER, FOR SOLUTION ORAL DAILY PRN
Qty: 3 PACKET | Refills: 0 | Status: SHIPPED | OUTPATIENT
Start: 2025-09-04 | End: 2025-09-05

## 2025-09-04 ASSESSMENT — PAIN SCALES - GENERAL: PAINLEVEL_OUTOF10: 10-WORST PAIN EVER

## 2025-09-04 ASSESSMENT — PATIENT HEALTH QUESTIONNAIRE - PHQ9
2. FEELING DOWN, DEPRESSED OR HOPELESS: SEVERAL DAYS
1. LITTLE INTEREST OR PLEASURE IN DOING THINGS: SEVERAL DAYS
SUM OF ALL RESPONSES TO PHQ9 QUESTIONS 1 AND 2: 2

## 2025-09-04 ASSESSMENT — ENCOUNTER SYMPTOMS
DEPRESSION: 1
LOSS OF SENSATION IN FEET: 1
OCCASIONAL FEELINGS OF UNSTEADINESS: 1

## 2025-09-04 ASSESSMENT — PAIN DESCRIPTION - PAIN TYPE: TYPE: CHRONIC PAIN

## 2025-09-05 ENCOUNTER — TELEPHONE (OUTPATIENT)
Dept: PRIMARY CARE | Facility: HOSPITAL | Age: 89
End: 2025-09-05
Payer: MEDICARE

## 2025-09-05 ENCOUNTER — DOCUMENTATION (OUTPATIENT)
Dept: PRIMARY CARE | Facility: HOSPITAL | Age: 89
End: 2025-09-05
Payer: MEDICARE

## 2025-09-05 DIAGNOSIS — K59.03 DRUG-INDUCED CONSTIPATION: Primary | ICD-10-CM

## 2025-09-05 RX ORDER — ADHESIVE BANDAGE
5 BANDAGE TOPICAL DAILY PRN
Qty: 360 ML | Refills: 0 | Status: SHIPPED | OUTPATIENT
Start: 2025-09-05 | End: 2025-09-15

## 2025-09-05 RX ORDER — POLYETHYLENE GLYCOL 3350 17 G/17G
17 POWDER, FOR SOLUTION ORAL DAILY
Qty: 238 G | Refills: 0 | Status: SHIPPED | OUTPATIENT
Start: 2025-09-05

## 2025-09-17 ENCOUNTER — APPOINTMENT (OUTPATIENT)
Dept: OTOLARYNGOLOGY | Facility: CLINIC | Age: 89
End: 2025-09-17
Payer: MEDICARE

## 2025-11-10 ENCOUNTER — APPOINTMENT (OUTPATIENT)
Dept: NEPHROLOGY | Facility: CLINIC | Age: 89
End: 2025-11-10
Payer: MEDICARE